# Patient Record
Sex: MALE | Employment: OTHER | URBAN - METROPOLITAN AREA
[De-identification: names, ages, dates, MRNs, and addresses within clinical notes are randomized per-mention and may not be internally consistent; named-entity substitution may affect disease eponyms.]

---

## 2022-04-01 ENCOUNTER — OUTSIDE SERVICES (OUTPATIENT)
Dept: PRIMARY CARE CLINIC | Age: 67
End: 2022-04-01

## 2022-04-01 DIAGNOSIS — S22.42XG CLOSED FRACTURE OF MULTIPLE RIBS OF LEFT SIDE WITH DELAYED HEALING, SUBSEQUENT ENCOUNTER: Primary | ICD-10-CM

## 2022-04-01 DIAGNOSIS — G20 PARKINSON DISEASE (HCC): ICD-10-CM

## 2022-04-01 DIAGNOSIS — J44.9 CHRONIC OBSTRUCTIVE PULMONARY DISEASE, UNSPECIFIED COPD TYPE (HCC): ICD-10-CM

## 2022-04-02 VITALS
RESPIRATION RATE: 18 BRPM | TEMPERATURE: 98.1 F | SYSTOLIC BLOOD PRESSURE: 121 MMHG | HEART RATE: 84 BPM | DIASTOLIC BLOOD PRESSURE: 79 MMHG

## 2022-04-02 ASSESSMENT — ENCOUNTER SYMPTOMS
BACK PAIN: 1
NAUSEA: 0
DIARRHEA: 0
SINUS PRESSURE: 0
COUGH: 0
SHORTNESS OF BREATH: 0
CONSTIPATION: 0
SINUS PAIN: 0

## 2022-04-02 NOTE — PROGRESS NOTES
Jessika Chaudhary is a 77 y.o. male being seen for his weekly follow up. Location of visit: [facility]    Visit Date:  04/01/2022    Reason for Visit:    Chief Complaint   Patient presents with    Other     rib fracture        HPI   Resident has had multiple falls, including one since arriving. He has fractured left ribs. He also has Parkinson's, Atrial fibrillation, COPD, neuropathy, anxiety and depression. Staff reports he is combative with changing and therapy reports he was not cooperative with therapy evaluation. Review of Systems   Constitutional: Positive for fatigue. Negative for chills and fever. HENT: Negative for congestion, sinus pressure and sinus pain. Respiratory: Negative for cough and shortness of breath. Cardiovascular: Positive for chest pain. Negative for palpitations and leg swelling. Gastrointestinal: Negative for constipation, diarrhea and nausea. Musculoskeletal: Positive for back pain and gait problem. Skin: Negative for rash and wound. Neurological: Positive for weakness. Psychiatric/Behavioral: Positive for agitation and behavioral problems. The patient is nervous/anxious. Physical Exam  Vitals and nursing note reviewed. Constitutional:       Appearance: Normal appearance. HENT:      Head: Normocephalic and atraumatic. Cardiovascular:      Rate and Rhythm: Normal rate and regular rhythm. Heart sounds: Normal heart sounds. Pulmonary:      Effort: Pulmonary effort is normal.      Breath sounds: Normal breath sounds. Abdominal:      General: Bowel sounds are normal.      Palpations: Abdomen is soft. Musculoskeletal:      Right lower leg: No edema. Left lower leg: No edema. Comments: Left rib pain   Skin:     General: Skin is warm. Neurological:      Mental Status: He is alert. He is disoriented. Motor: Weakness present. Psychiatric:         Mood and Affect: Mood is anxious. Behavior: Behavior is agitated. Cognition and Memory: Cognition is impaired. Memory is impaired. Not on File     No past medical history on file. ASSESSMENT:  /79   Pulse 84   Temp 98.1 °F (36.7 °C)   Resp 18       Diagnosis Orders   1. Closed fracture of multiple ribs of left side with delayed healing, subsequent encounter     2. Parkinson disease ([facility]     3. Chronic obstructive pulmonary disease, unspecified COPD type ([facility]          Plan:  Continue lidocaine patch to ribs. Approach slowly and explain what needs to be done before assisting. Therapy as tolerated.

## 2022-04-05 ENCOUNTER — OUTSIDE SERVICES (OUTPATIENT)
Dept: PRIMARY CARE CLINIC | Age: 67
End: 2022-04-05

## 2022-04-05 DIAGNOSIS — I10 PRIMARY HYPERTENSION: ICD-10-CM

## 2022-04-05 DIAGNOSIS — G20 PARKINSON DISEASE (HCC): ICD-10-CM

## 2022-04-05 PROBLEM — M54.9 CHRONIC BACK PAIN: Status: ACTIVE | Noted: 2019-07-30

## 2022-04-05 PROBLEM — G62.9 PERIPHERAL NERVE DISEASE: Status: ACTIVE | Noted: 2018-07-10

## 2022-04-05 PROBLEM — N52.9 ERECTILE DYSFUNCTION: Status: ACTIVE | Noted: 2018-02-12

## 2022-04-05 PROBLEM — R39.9 LOWER URINARY TRACT SYMPTOMS: Status: ACTIVE | Noted: 2018-02-12

## 2022-04-05 PROBLEM — G89.29 CHRONIC BACK PAIN: Status: ACTIVE | Noted: 2019-07-30

## 2022-04-05 ASSESSMENT — ENCOUNTER SYMPTOMS
SHORTNESS OF BREATH: 0
COUGH: 0
VOMITING: 0
NAUSEA: 0
DIARRHEA: 0

## 2022-04-05 NOTE — ASSESSMENT & PLAN NOTE
with fall at home- continue therapy. pt does not want to stay and wants to do OP PT. Will see how therapy goes. Encouraged pt to participate fully so that he can go home soon.

## 2022-04-05 NOTE — PROGRESS NOTES
Reena Hernandez is a 77 y.o. male being seen for his weekly follow up. Location of visit: [facility]    HPI:  New today:Pt wanting to go home. No new complaints- says pain is controlled with patches. Had a fall right after admit, ut that seems to be due to pain- he was trying to get up and get comfortable per nursing/ therapy        Review of Systems   Constitutional: Negative for activity change, appetite change, chills, diaphoresis and fever. HENT: Negative for congestion. Respiratory: Negative for cough and shortness of breath. Cardiovascular: Negative for chest pain and palpitations. Gastrointestinal: Negative for diarrhea, nausea and vomiting. Genitourinary: Negative for hematuria. Musculoskeletal: Negative for arthralgias and myalgias. Skin: Negative for rash and wound. Neurological: Negative for weakness and headaches. Psychiatric/Behavioral: Negative for agitation, dysphoric mood and sleep disturbance. The patient is not nervous/anxious. Physical Exam  Vitals reviewed. Constitutional:       General: He is not in acute distress. HENT:      Head: Normocephalic and atraumatic. Nose: No congestion or rhinorrhea. Eyes:      General:         Right eye: No discharge. Left eye: No discharge. Cardiovascular:      Rate and Rhythm: Normal rate and regular rhythm. Pulmonary:      Effort: Pulmonary effort is normal. No respiratory distress. Breath sounds: Normal breath sounds. Abdominal:      Palpations: Abdomen is soft. Tenderness: There is no abdominal tenderness. Musculoskeletal:      Right lower leg: No edema. Left lower leg: No edema. Skin:     General: Skin is warm and dry. Neurological:      Mental Status: He is alert. Mental status is at baseline. ASSESSMENT:   Diagnosis Orders   1. Parkinson disease ([address]     2. Primary hypertension         PLAN:    1.  Parkinson disease [facility])  Assessment & Plan:   with fall at home- continue therapy. pt does not want to stay and wants to do OP PT. Will see how therapy goes. Encouraged pt to participate fully so that he can go home soon.    2. Primary hypertension  Assessment & Plan:   BP stable

## 2022-04-12 ENCOUNTER — OUTSIDE SERVICES (OUTPATIENT)
Dept: PRIMARY CARE CLINIC | Age: 67
End: 2022-04-12

## 2022-04-12 DIAGNOSIS — I48.91 ATRIAL FIBRILLATION, UNSPECIFIED TYPE (HCC): ICD-10-CM

## 2022-04-12 DIAGNOSIS — G20 PARKINSON DISEASE (HCC): ICD-10-CM

## 2022-04-12 DIAGNOSIS — I10 PRIMARY HYPERTENSION: ICD-10-CM

## 2022-04-12 ASSESSMENT — ENCOUNTER SYMPTOMS
NAUSEA: 0
COUGH: 0
DIARRHEA: 0
SHORTNESS OF BREATH: 0
VOMITING: 0

## 2022-04-12 NOTE — ASSESSMENT & PLAN NOTE
still very impulsive per therapy but much improved and probably as good as they will get him here. Plan for discharge on Friday.

## 2022-04-12 NOTE — PROGRESS NOTES
improved and probably as good as they will get him here. Plan for discharge on Friday. 2. Atrial fibrillation, unspecified type [facility])  Assessment & Plan:   Asymptomatic, continue current medications  3.  Primary hypertension  Assessment & Plan:   Well-controlled, continue current medications

## 2022-12-15 ENCOUNTER — HOSPITAL ENCOUNTER (INPATIENT)
Age: 67
LOS: 15 days | Discharge: HOME | End: 2022-12-30
Attending: PHYSICAL MEDICINE & REHABILITATION | Admitting: PHYSICAL MEDICINE & REHABILITATION
Payer: MEDICARE

## 2022-12-15 DIAGNOSIS — G20 PARKINSON DISEASE (HCC): ICD-10-CM

## 2022-12-15 DIAGNOSIS — I48.91 ATRIAL FIBRILLATION, UNSPECIFIED TYPE (HCC): Primary | ICD-10-CM

## 2022-12-15 DIAGNOSIS — G95.20 CORD COMPRESSION (HCC): ICD-10-CM

## 2022-12-15 PROCEDURE — 1180000000 HC REHAB R&B

## 2022-12-15 PROCEDURE — 6370000000 HC RX 637 (ALT 250 FOR IP): Performed by: PHYSICAL MEDICINE & REHABILITATION

## 2022-12-15 RX ORDER — TIZANIDINE 2 MG/1
2 TABLET ORAL EVERY 8 HOURS PRN
Status: DISCONTINUED | OUTPATIENT
Start: 2022-12-15 | End: 2022-12-30 | Stop reason: HOSPADM

## 2022-12-15 RX ORDER — ESCITALOPRAM OXALATE 10 MG/1
20 TABLET ORAL DAILY
Status: DISCONTINUED | OUTPATIENT
Start: 2022-12-16 | End: 2022-12-30 | Stop reason: HOSPADM

## 2022-12-15 RX ORDER — ACETAMINOPHEN 325 MG/1
650 TABLET ORAL EVERY 4 HOURS PRN
Status: DISCONTINUED | OUTPATIENT
Start: 2022-12-15 | End: 2022-12-30 | Stop reason: HOSPADM

## 2022-12-15 RX ORDER — BISACODYL 10 MG
10 SUPPOSITORY, RECTAL RECTAL DAILY PRN
Status: DISCONTINUED | OUTPATIENT
Start: 2022-12-15 | End: 2022-12-30 | Stop reason: HOSPADM

## 2022-12-15 RX ORDER — SENNA PLUS 8.6 MG/1
2 TABLET ORAL DAILY PRN
Status: DISCONTINUED | OUTPATIENT
Start: 2022-12-15 | End: 2022-12-19

## 2022-12-15 RX ORDER — OXYCODONE HYDROCHLORIDE AND ACETAMINOPHEN 5; 325 MG/1; MG/1
2 TABLET ORAL EVERY 4 HOURS PRN
Status: DISCONTINUED | OUTPATIENT
Start: 2022-12-15 | End: 2022-12-18

## 2022-12-15 RX ORDER — RASAGILINE 1 MG/1
1 TABLET ORAL DAILY
Status: DISCONTINUED | OUTPATIENT
Start: 2022-12-16 | End: 2022-12-30 | Stop reason: HOSPADM

## 2022-12-15 RX ORDER — WARFARIN SODIUM 5 MG/1
5 TABLET ORAL DAILY
Status: DISCONTINUED | OUTPATIENT
Start: 2022-12-16 | End: 2022-12-15 | Stop reason: DRUGHIGH

## 2022-12-15 RX ORDER — POLYETHYLENE GLYCOL 3350 17 G/17G
17 POWDER, FOR SOLUTION ORAL DAILY
Status: DISCONTINUED | OUTPATIENT
Start: 2022-12-15 | End: 2022-12-19

## 2022-12-15 RX ADMIN — TIZANIDINE 2 MG: 2 TABLET ORAL at 20:53

## 2022-12-15 RX ADMIN — OXYCODONE HYDROCHLORIDE AND ACETAMINOPHEN 2 TABLET: 5; 325 TABLET ORAL at 20:53

## 2022-12-15 ASSESSMENT — LIFESTYLE VARIABLES
HOW OFTEN DO YOU HAVE A DRINK CONTAINING ALCOHOL: NEVER
HOW MANY STANDARD DRINKS CONTAINING ALCOHOL DO YOU HAVE ON A TYPICAL DAY: PATIENT DOES NOT DRINK

## 2022-12-15 ASSESSMENT — PAIN SCALES - GENERAL: PAINLEVEL_OUTOF10: 2

## 2022-12-15 NOTE — CARE COORDINATION
ACUTE INPATIENT REHABILITATION  Financial Disclosure Statement: Eligibility and Benefit Verification    Patient Name: Marvin Douglass MRN: <[id number]>     Disclosure Statement  [facility] Acute Inpatient Rehabilitation at [facility] provides 24 hour individualized service to patients with functional limitations due to, but not limited to stroke, brain injury, spinal cord injury, major multiple trauma, hip fracture, amputation, and neurological disorders. Acute Inpatient Rehabilitation provides rehabilitative nursing, physician coverage, as well as physical therapy, occupational therapy, speech therapy, recreational therapy and other services as deemed necessary by our Board Certified Physical Medicine and Via Tayo Brown, Dr. Elida Horn and Dr. Sharon Pathak and Dr. Mala Buckner. [facility] Acute Inpatient Rehabilitation at [facility] is fully accredited by the Commission on Accreditation of Rehabilitation Facilities (CARF) and The Joint Commission (TJC). At a minimum, you will receive 5 days of therapy services totaling at least 15 hours per week. Your treatment program will consist of physical therapy at least 7.5 hours per week; occupational therapy 7.5 hours per week; and speech therapy 1.5 hours per week, as applicable. Your estimated length of stay is currently:  7-10 days. Your insurance coverage has been verified as follows:   Primary Insurance: Medicare   Deductible: $ 1556                       Coverage:  Per Medicare Benefit Period  Days 1-60:  $0 Co-Insurance  Days 61-90:  $389/day Co-Insurance  Days 91 and beyond:  $778/day Co-Insurance      Secondary Insurance: [employer] often cover co-payments amounts. Please contact your insurance company to verify benefits. [facility]: [phone] for all billing questions.     Thank You for choosing [address] [place] [address] at [facility].                   Revised 10/04/2022

## 2022-12-15 NOTE — CARE COORDINATION
[address] [facility]  PRE-ADMISSION ASSESSMENT     Patient Name: Maria A Jefferson        MRN:   <[id number]>    : 1955  (78 y.o.)  Gender: male   Ethnicity: Not , /a or Ecuadorean Origin  Race: White     Admitted from:  Other Facility: [address]       Type of Admission:  New Admission      Date of Onset / Admission to the [address] [place] [address]:  22     Inpatient Rehabilitation Admitting Diagnosis:  Cord compression s/p surgical decompression/fusion L1-4      Did patient have surgery/procedures? Yes, As Listed Below   2022:   L1 through L4 level laminectomy and bilateral foraminotomy  L3-4 Angelo procedure   Non segmental fixation S8-0  Application of interbody fusion device L3-4   Removal of posterior instrumentation L4-5  Harvesting bone graft through same incision   Physicians:   Omar Apodaca MD  Physician   Neurosurgery      Risk for Clinical Complications:   Moderate      Co-morbidities:    Parkinsons   Atrial Fibrillation  OA  Financial Information  Primary insurance: Medicare      Secondary Insurance: Commercial Insurance:[employer]       Precautions:   [x]Cardiac Precautions: Stimulator with Battery, Left Chest  []Total hip precautions: No Total Hip Precautions  []Weight Bearing status: No Weight Bearing Restrictions  []Spinal precautions: Back brace when up or sitting   [x]Safety Precautions/Concerns:  Fall Risk, General Precautions, R GHADA drain  [x]Visually impaired: Yes: Glasses       []Hard of Hearing: Within Functional Limits        Communication Aids, Devices or Interpretation Services Required: None     Isolation Precautions:  None: Standard Precautions                  Physiatrist: Dr. Letha Ko       Patients Occupation: Retired     Reviewed Lab and Diagnostic reports from Current Admission: Yes     Patients Prior Functional  Level:   Patient independent with bed mobility, significant assist to rise from couch, modified independent ambulation with 4WW, independent for toileting, dressing and bathing except wife assists with drying, wife performs all household chores/shopping/driving     History of current illness, per PM&R Consult: Patient with spinal cord compression who had L1-4 laminectomy and fusion performed by Dr. Jeanie Rivera formarked L1-2 stenosis and degenerative spondylolisthesis at L3-4 on 12/7/22. Medical records indicate some post-op confusion. Prognosis: Fair     Current functional status for upper extremity ADLs:   Dependent                                  Current functional status for lower extremity ADLs:   Dependent      Current functional status for bed, chair, wheelchair transfers:   Rolling: Min Assist  Supine to Sit: Mod Assist  Scooting: Min Assist  Sit to Stand:  Mod Assist x2  Stand to Sit: Mod Assist x2   Bed to Chair: Max A x2     Current functional status for toilet transfers:   Dependent     Current functional status for locomotion:   Ambulation distance 2ft with rolling walker, Max assist x2      Current functional status for comprehension: Exceptions to [facility]     Current functional status for expression: Exceptions to [facility]     Current functional status for social interaction: Exceptions to [facility]     Current functional status for problem solving: Exceptions to [facility]     Current functional status for memory: Exceptions to [facility]     Current Deficits R/T Impairment: Impaired Functional Mobility and Decreased ADLs     Required Therapy Services:  Physical Therapy: Yes  Occupational Therapy: Yes  Speech Therapy: May benefit from speech therapy services, patient to be screened upon admission       Additional Services:    [x] /Case Management    [] Recreational Therapy, as appropriate    [x] Nutrition Consult, as appropriate  [x] Dietary Needs/Preferences: No Specialized Dietary Needs/Preferences Identified  [] Dialysis  [x] Cultural/Jewish Needs/Preferences: No Specialized Cultural/Jewish Needs/Preferences Identified  [] Special Equipment Needs  [] Special Medication Needs  [x] Other information relevant to patient's care needs: R GHADA Drain, Back Brace     Preferred Language: English     Does the patient need or want an  to communicate with a doctor or health care staff? No     Rehab Justification:  Needs 3 hrs therapy per day or 15 hours per week:  Yes  Identified Rehab Nursing needs: Yes  Intense Interdisciplinary need:  Yes  Need for 24 hr physician supervision:  Yes  Measurable improved quality of life:  Yes  Willingness to participate:  Yes  Medical Necessity:  Yes  Patient able to tolerate care proposed:  Yes     Expected Discharge Destination/Functional Level:  Home with assist  Expected length of time to achieve that level of improvement: 1-2 weeks  Expected Post Discharge Treatments: Home with possible Home Care     Acute Inpatient Rehabilitation Disclosure Statement will be provided to patient upon admission to ARU with patient's verbalization of understanding. I have reviewed and concur with the findings and results of the pre-admission screening assessment completed by the Inpatient Rehabilitation Admissions Coordinator.

## 2022-12-15 NOTE — PROGRESS NOTES
[address] [facility]  PRE-ADMISSION ASSESSMENT    Patient Name: Fabian Sharma        MRN: <[id number]>    : 1955  (78 y.o.)  Gender: male   Ethnicity: Not , /a or Liberian Origin  Race: White    Admitted from:  Other Facility: [address]      Type of Admission:  New Admission     Date of Onset / Admission to the [address] [place] [address]:  22    Inpatient Rehabilitation Admitting Diagnosis:  Cord compression s/p surgical decompression/fusion L1-4     Did patient have surgery/procedures? Yes, As Listed Below   2022:   L1 through L4 level laminectomy and bilateral foraminotomy  L3-4 Angelo procedure   Non segmental fixation N3-8  Application of interbody fusion device L3-4   Removal of posterior instrumentation L4-5  Harvesting bone graft through same incision   Physicians:   Som Schulte MD  Physician   Neurosurgery     Risk for Clinical Complications:   Moderate     Co-morbidities:    Parkinsons   Atrial Fibrillation  OA  Financial Information  Primary insurance: Medicare     Secondary Insurance: Commercial Insurance:A[employer]      Precautions:   [x]Cardiac Precautions: Stimulator with Battery, Left Chest  []Total hip precautions: No Total Hip Precautions  []Weight Bearing status: No Weight Bearing Restrictions  []Spinal precautions: Back brace when up or sitting   [x]Safety Precautions/Concerns:  Fall Risk, General Precautions, R GHADA drain  [x]Visually impaired: Yes: Glasses    []Hard of Hearing: Within Functional Limits       Communication Aids, Devices or Interpretation Services Required: None    Isolation Precautions:  None: Standard Precautions       Physiatrist: Dr. Gene Soler      Patients Occupation: Retired    Reviewed Lab and Diagnostic reports from Current Admission: Yes    Patients Prior Functional  Level:   Patient independent with bed mobility, significant assist to rise from couch, modified independent ambulation with 4WW, independent for toileting, dressing and bathing except wife assists with drying, wife performs all household chores/shopping/driving    History of current illness, per PM&R Consult: Patient with spinal cord compression who had L1-4 laminectomy and fusion performed by Dr. Bebo Jason formarked L1-2 stenosis and degenerative spondylolisthesis at L3-4 on 12/7/22. Medical records indicate some post-op confusion. Prognosis: Fair    Current functional status for upper extremity ADLs:   Dependent       Current functional status for lower extremity ADLs:   Dependent     Current functional status for bed, chair, wheelchair transfers:   Rolling: Min Assist  Supine to Sit: Mod Assist  Scooting: Min Assist  Sit to Stand:  Mod Assist x2  Stand to Sit: Mod Assist x2   Bed to Chair: Max A x2    Current functional status for toilet transfers:   Dependent    Current functional status for locomotion:   Ambulation distance 2ft with rolling walker, Max assist x2     Current functional status for comprehension: Exceptions to [facility]    Current functional status for expression: Exceptions to [facility]    Current functional status for social interaction: Exceptions to [facility]    Current functional status for problem solving: Exceptions to [facility]    Current functional status for memory: Exceptions to [facility]    Current Deficits R/T Impairment: Impaired Functional Mobility and Decreased ADLs    Required Therapy Services:  Physical Therapy: Yes  Occupational Therapy: Yes  Speech Therapy: May benefit from speech therapy services, patient to be screened upon admission      Additional Services:    [x] /Case Management    [] Recreational Therapy, as appropriate    [x] Nutrition Consult, as appropriate  [x] Dietary Needs/Preferences: No Specialized Dietary Needs/Preferences Identified  [] Dialysis  [x] Cultural/Orthodoxy Needs/Preferences: No Specialized Cultural/Orthodoxy Needs/Preferences Identified  [] Special Equipment Needs  [] Special Medication Needs  [x] Other information relevant to patient's care needs: R GHADA Drain, Back Brace    Preferred Language: English    Does the patient need or want an  to communicate with a doctor or health care staff? No    Rehab Justification:  Needs 3 hrs therapy per day or 15 hours per week:  Yes  Identified Rehab Nursing needs: Yes  Intense Interdisciplinary need:  Yes  Need for 24 hr physician supervision:  Yes  Measurable improved quality of life:  Yes  Willingness to participate:  Yes  Medical Necessity:  Yes  Patient able to tolerate care proposed:  Yes    Expected Discharge Destination/Functional Level:  Home with assist  Expected length of time to achieve that level of improvement: 1-2 weeks  Expected Post Discharge Treatments: Home with possible Home Care    Acute Inpatient Rehabilitation Disclosure Statement will be provided to patient upon admission to ARU with patient's verbalization of understanding. I have reviewed and concur with the findings and results of the pre-admission screening assessment completed by the Inpatient Rehabilitation Admissions Coordinator.

## 2022-12-15 NOTE — CARE COORDINATION
Pre-Admission Assessment completed and co-signed by PM&R physiatrist Dr. Cathryn Sher. Preadmission assessment valid for 48 hours after co-signature. Discharging provider responsible for Discharge/Readmit medication reconciliation action. Status: Discharge orders received. DVT Prophylaxis Plan: BLE Dopplers Required: Status: Negative / Only the preliminary report is needed for result interpretation/impression, do not need to wait for final report / If Negative: Patient may admit / If POSITIVE: Patient MUST be medically cleared and have treatment plan initiated prior to admission     Will need clarification from surgeon regarding re-start of Warfarin. Discharging nurse to call nursing report to Acute Inpatient Rehabilitation nursing team at extension [phone] before patient departure. Transportation Time: 4:00pm    Updated CM: Via Telephone at this time.

## 2022-12-16 PROBLEM — E44.0 MODERATE MALNUTRITION (HCC): Status: ACTIVE | Noted: 2022-12-16

## 2022-12-16 LAB
ABSOLUTE EOS #: 0.3 K/UL (ref 0–0.4)
ABSOLUTE LYMPH #: 2 K/UL (ref 1–4.8)
ABSOLUTE MONO #: 0.6 K/UL (ref 0.1–1.3)
ALBUMIN SERPL-MCNC: 4 G/DL (ref 3.5–5.2)
ALP BLD-CCNC: 96 U/L (ref 40–129)
ALT SERPL-CCNC: 44 U/L (ref 5–41)
ANION GAP SERPL CALCULATED.3IONS-SCNC: 12 MMOL/L (ref 9–17)
AST SERPL-CCNC: 35 U/L
BASOPHILS # BLD: 1 % (ref 0–2)
BASOPHILS ABSOLUTE: 0.1 K/UL (ref 0–0.2)
BILIRUB SERPL-MCNC: 0.7 MG/DL (ref 0.3–1.2)
BILIRUBIN DIRECT: 0.2 MG/DL
BILIRUBIN, INDIRECT: 0.5 MG/DL (ref 0–1)
BUN BLDV-MCNC: 23 MG/DL (ref 8–23)
CALCIUM SERPL-MCNC: 9.7 MG/DL (ref 8.6–10.4)
CHLORIDE BLD-SCNC: 103 MMOL/L (ref 98–107)
CO2: 25 MMOL/L (ref 20–31)
CREAT SERPL-MCNC: 0.8 MG/DL (ref 0.7–1.2)
EOSINOPHILS RELATIVE PERCENT: 4 % (ref 0–4)
GFR SERPL CREATININE-BSD FRML MDRD: >60 ML/MIN/1.73M2
GLUCOSE BLD-MCNC: 95 MG/DL (ref 70–99)
HCT VFR BLD CALC: 35.9 % (ref 41–53)
HEMOGLOBIN: 12.5 G/DL (ref 13.5–17.5)
INR BLD: 1.3
LYMPHOCYTES # BLD: 28 % (ref 24–44)
MCH RBC QN AUTO: 33.7 PG (ref 26–34)
MCHC RBC AUTO-ENTMCNC: 34.9 G/DL (ref 31–37)
MCV RBC AUTO: 96.7 FL (ref 80–100)
MONOCYTES # BLD: 8 % (ref 1–7)
PDW BLD-RTO: 13.8 % (ref 11.5–14.9)
PLATELET # BLD: 423 K/UL (ref 150–450)
PMV BLD AUTO: 7 FL (ref 6–12)
POTASSIUM SERPL-SCNC: 3.9 MMOL/L (ref 3.7–5.3)
PROTHROMBIN TIME: 15.7 SEC (ref 11.8–14.6)
RBC # BLD: 3.72 M/UL (ref 4.5–5.9)
SEG NEUTROPHILS: 59 % (ref 36–66)
SEGMENTED NEUTROPHILS ABSOLUTE COUNT: 4.1 K/UL (ref 1.3–9.1)
SODIUM BLD-SCNC: 140 MMOL/L (ref 135–144)
TOTAL PROTEIN: 7 G/DL (ref 6.4–8.3)
WBC # BLD: 7 K/UL (ref 3.5–11)

## 2022-12-16 PROCEDURE — 1180000000 HC REHAB R&B

## 2022-12-16 PROCEDURE — 97530 THERAPEUTIC ACTIVITIES: CPT

## 2022-12-16 PROCEDURE — 97116 GAIT TRAINING THERAPY: CPT

## 2022-12-16 PROCEDURE — 92523 SPEECH SOUND LANG COMPREHEN: CPT

## 2022-12-16 PROCEDURE — 97535 SELF CARE MNGMENT TRAINING: CPT

## 2022-12-16 PROCEDURE — 80076 HEPATIC FUNCTION PANEL: CPT

## 2022-12-16 PROCEDURE — 85610 PROTHROMBIN TIME: CPT

## 2022-12-16 PROCEDURE — 36415 COLL VENOUS BLD VENIPUNCTURE: CPT

## 2022-12-16 PROCEDURE — 99223 1ST HOSP IP/OBS HIGH 75: CPT | Performed by: PHYSICAL MEDICINE & REHABILITATION

## 2022-12-16 PROCEDURE — 6370000000 HC RX 637 (ALT 250 FOR IP): Performed by: STUDENT IN AN ORGANIZED HEALTH CARE EDUCATION/TRAINING PROGRAM

## 2022-12-16 PROCEDURE — 85025 COMPLETE CBC W/AUTO DIFF WBC: CPT

## 2022-12-16 PROCEDURE — 97162 PT EVAL MOD COMPLEX 30 MIN: CPT

## 2022-12-16 PROCEDURE — 97166 OT EVAL MOD COMPLEX 45 MIN: CPT

## 2022-12-16 PROCEDURE — 80048 BASIC METABOLIC PNL TOTAL CA: CPT

## 2022-12-16 PROCEDURE — 6370000000 HC RX 637 (ALT 250 FOR IP): Performed by: PHYSICAL MEDICINE & REHABILITATION

## 2022-12-16 PROCEDURE — 92610 EVALUATE SWALLOWING FUNCTION: CPT

## 2022-12-16 PROCEDURE — 99223 1ST HOSP IP/OBS HIGH 75: CPT | Performed by: INTERNAL MEDICINE

## 2022-12-16 RX ORDER — CARBIDOPA AND LEVODOPA 25; 100 MG/1; MG/1
1 TABLET, EXTENDED RELEASE ORAL 2 TIMES DAILY
Status: DISCONTINUED | OUTPATIENT
Start: 2022-12-16 | End: 2022-12-30 | Stop reason: HOSPADM

## 2022-12-16 RX ORDER — WARFARIN SODIUM 5 MG/1
5 TABLET ORAL
Status: COMPLETED | OUTPATIENT
Start: 2022-12-16 | End: 2022-12-16

## 2022-12-16 RX ADMIN — WARFARIN SODIUM 5 MG: 5 TABLET ORAL at 19:49

## 2022-12-16 RX ADMIN — CARBIDOPA AND LEVODOPA 1 TABLET: 25; 100 TABLET ORAL at 19:50

## 2022-12-16 RX ADMIN — RASAGILINE 1 MG: 1 TABLET ORAL at 10:20

## 2022-12-16 RX ADMIN — ESCITALOPRAM OXALATE 20 MG: 10 TABLET ORAL at 08:02

## 2022-12-16 RX ADMIN — POLYETHYLENE GLYCOL 3350 17 G: 17 POWDER, FOR SOLUTION ORAL at 08:02

## 2022-12-16 RX ADMIN — CARBIDOPA AND LEVODOPA 1 TABLET: 25; 100 TABLET ORAL at 10:15

## 2022-12-16 RX ADMIN — CARBIDOPA AND LEVODOPA 1 TABLET: 25; 100 TABLET, EXTENDED RELEASE ORAL at 19:50

## 2022-12-16 ASSESSMENT — ENCOUNTER SYMPTOMS
EYES NEGATIVE: 1
ALLERGIC/IMMUNOLOGIC NEGATIVE: 1
RESPIRATORY NEGATIVE: 1
GASTROINTESTINAL NEGATIVE: 1

## 2022-12-16 ASSESSMENT — 9 HOLE PEG TEST
TESTTIME_SECONDS: 156.95
TESTTIME_SECONDS: 99.9
TEST_RESULT: IMPAIRED
TEST_RESULT: IMPAIRED

## 2022-12-16 ASSESSMENT — PAIN SCALES - GENERAL: PAINLEVEL_OUTOF10: 2

## 2022-12-16 NOTE — CARE COORDINATION
Called and left a VM message on Dr. Sonia Rose Nurses line. This patient has an appointment on Monday 12/19 with the Urology office and I asked for a return call so we could reschedule this patients appointment.

## 2022-12-16 NOTE — PROGRESS NOTES
[address]    Acute Rehabilitation Occupational Therapy Evaluation     Date: 22  Patient Name: Stacy Baird       Room: 7106/0486-18  MRN: [id number]  Account: [de-identified]   : 1955  (78 y.o.) Gender: male       Referring Practitioner: Emiliano Hwang MD  Diagnosis: Cord compression s/p surgical decompression/fusion L1-4  Additional Pertinent Hx: Patient with spinal cord compression who had L1-4 laminectomy and fusion performed by Dr. Andrés Paris formarked L1-2 stenosis and degenerative spondylolisthesis at L3-4 on 22. Medical records indicate some post-op confusion. Pt admitted to ARU on 12/15/22. Treatment Diagnosis: Impaired self care status    Past Medical History:  has no past medical history on file. Past Surgical History:   has no past surgical history on file. Restrictions  Restrictions/Precautions: Surgical Protocols, Fall Risk, General Precautions, Up as Tolerated  Required Braces or Orthoses?: Yes  Implants present? : Metal implants, Deep brain stimulator (Spinal Fusion and B TKAs)  Required Braces or Orthoses  Spinal: Lumbar Corset      Position Activity Restriction  Spinal Precautions: Avoid Excessive Bending, Lifting, and Twisting of spine  Other position/activity restrictions: LSO to be donned when OOB; [id number] Deepti Garcia per Dr. Gita Wagner to shower POD #7 (Sx date: 22). PT/OT therapy staff may titrate oxygen down as tolerated in therapy, but may only titrate up to maximum 4 L NC as needed in therapy. Nursing should perform oxygen titration > 4 L. Subjective  Subjective: \"I would like to be able to get up and go so I can go outside and do some repairs and go downstairs and reload some guerrero. Do the things I normally do. \" Pt stated in regards to ARU goals    Comments: [id number] Deepti Garcia per WENDY Gallegos for OT eval         Pain: Pt reports 7-8/10 pain in back    Social/Functional History  Social/Functional History  Lives With: Spouse (Danay)  Type of Home: House  Home Layout: One level, Laundry in basement [employer] does laundry. Per Solis Davies he will access basement to use the computer)  Home Access: Stairs to enter without rails  Entrance Stairs - Number of Steps: 1 platform step + 2 Steps with no Railings to enter; To basement level has stairs with landing and Bilateral Railings (\"Steps are curved\" per spouse)  Bathroom Shower/Tub: Walk-in shower, Doors  Bathroom Toilet: Standard  Bathroom Equipment: Hand-held shower, Grab bars in shower, Built-in shower seat, Grab bars around toilet  Bathroom Accessibility:  (Not accessible by 0XA)  Home Equipment: Precilla Calloway, 4 wheeled, [address], [place], quad, [place], [place], KOGL bars, Walker, standard (brakes do not work on [place])  Has the patient had two or more falls in the past year or any fall with injury in the past year?: Yes (\"too many to guess\" pt reports most falls occurring at home while \"trying to maneuver around [employer] Help From: Family, Neighbor  ADL Assistance: Needs assistance  Homemaking Assistance: Needs assistance (wife as primary)  Homemaking Responsibilities: No  Ambulation Assistance: Needs assistance (3DT; per Wife pt often times abandons walker. 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 Pt appears to be somewhat of a poor historian this date. Solis Davies arrives to help verify information.     Objective  Vision  Vision: Impaired  Vision Exceptions: Wears glasses at all times       Hearing  Hearing: Within functional limits         Sensation  Overall Sensation Status: Impaired (pt denies initially however pt has impaired light touch sensation on plantar surfaces of toes including his great toes.)    Observation/Palpation  Observation: LSO donned (LSO tends to slide upward on patient with movement) delayed processing and cognition impaired requiring maximal cues for safety    Cognition  Overall Orientation Status: Within Functional Limits  Orientation Level: Oriented X4    Cognition  Overall Cognitive Status: Exceptions  Arousal/Alertness: Delayed responses to stimuli  Following Commands: Follows one step commands with increased time, Follows one step commands with repetition  Attention Span: Difficulty attending to directions (Initially perseverating on absence of his wife during beginning of session)  Memory: Decreased short term memory, Decreased recall of precautions, Decreased recall of recent events  Safety Judgement: Decreased awareness of need for assistance, Decreased awareness of need for safety (Max cues for hands placement during transfers.)  Problem Solving: Assistance required to identify errors made, Assistance required to correct errors made, Assistance required to implement solutions, Assistance required to generate solutions, Decreased awareness of errors  Insights: Decreased awareness of deficits  Initiation: Requires cues for some  Sequencing: Requires cues for some  Cognition Comment: Pt's Wife Daisy Ferreira states that pt's cognition and memory is near baseline;  Hx of parkinson's disease Dx'd 10-15 years ago    Activities of Daily Living       Feeding: Stand by assistance  Feeding Skilled Clinical Factors: per wife report    Grooming: Minimal assistance  Grooming Skilled Clinical Factors: Min A with LUE to hold toothbrush due to pt dropping into sink mulitple times    UE Bathing: Maximum assistance  UE Bathing Skilled Clinical Factors: OT provided max verbal cues on this date to complete upper body bathing task with pt only able to minimally participate perserverating on washing the sink. [place] for minimal participation in task. Therapist A to complete. LE Bathing: Maximum assistance  LE Bathing Skilled Clinical Factors: Pt able to wash bilateral thighs while sitting in wheelchair. Pt required A with bilateral lower legs . Mod A for standing balance with therapist A to complete bolivar/bottom hygiene    UE Dressing: Maximum assistance  UE Dressing Skilled Clinical Factors: A for orientation of shirt with increased time. Pt able to find one sleeve with increased time and verbal cues. Pt required therapist A with threading LUE, putting short over head, and pulling down over chest and back. A with doffing/donning lumbar corset. LE Dressing: Dependent/Total  LE Dressing Skilled Clinical Factors: Therapist complete threading BLE and pulling up over hips. Mod A while standing for balance. Toileting: Dependent/Total  Toileting Skilled Clinical Factors: 2 person A while standing with A with clothing management and hygiene    Additional Comments: OT facilitated pts engagement in self care tasks on this date. Pt required max verbal/tactile cues with fair carryover. Pt currenlty limited due to decreased strength, FMC, balance, activity tolerance, and cognitive barriers impacting safety and independence with self care tasks.     OT scores     Eating  Assistance Needed: Supervision or touching assistance  Comment: per wife  CARE Score: 4  Discharge Goal: Set-up or clean-up assistance  Oral Hygiene  Assistance Needed: Partial/moderate assistance  CARE Score: 3  Discharge Goal: Set-up or clean-up assistance  [address] needed: Dependent  CARE Score: 1  Discharge Goal: Supervision or touching assistance  Shower/Bathe Self  Assistance Needed: Substantial/maximal assistance  CARE Score: 2  Discharge Goal: Supervision or touching assistance  Upper Body Dressing  Assistance Needed: Substantial/maximal assistance  CARE Score: 2  Discharge Goal: Supervision or touching assistance  Lower Body Dressing  Assistance Needed: Dependent  CARE Score: 1  Discharge Goal: Supervision or touching assistance  Putting On/Taking Off Footwear  Assistance Needed: Substantial/maximal assistance  CARE Score: 2  Discharge Goal: Supervision or touching assistance  Toilet Transfer  Assistance needed: Dependent  CARE Score: 1  Discharge Goal: Supervision or touching assistance     UE Function     LUE AROM (degrees)  LUE AROM : WFL  LUE General AROM: ~100 degrees shoulder flexion; otherwise WFL        Tone RUE  RUE Tone: Normotonic    LUE Strength  Gross LUE Strength: WFL  L Hand General: 4-/5    Left Hand Strength -  (lbs)  Handle Setting 3: 63# (67, 60, 62) (Norm: #)            RUE AROM (degrees)  RUE AROM : WFL  RUE General AROM: ~100 degrees shoulder flexion; otherwise WFL     Right Hand AROM (degrees)  Right Hand AROM: WFL  Tone LUE  LUE Tone: Normotonic    RUE Strength  Gross RUE Strength: WFL  R Hand General: 4-/5    Right Hand Strength -  (lbs)  Handle Setting 3: 63.3# (71, 61, 58) (Norm: #)         Fine Motor Skills/Coordination  Hand Dominance  Hand Dominance: Right  Coordination  Movements Are Fluid And Coordinated: No  Coordination and Movement Description: Fine motor impairments, Gross motor impairments, Decreased speed, Decreased accuracy, Right UE, Left UE, Tremors  Fine Motor Skills  Left 9-Hole Peg Test: Impaired  Left 9 Hole Peg Test Time (secs): 156.95 (Norm: 21-29 seconds)  Right 9-Hole Peg Test: Impaired  Right 9 Hole Peg Test Time (secs): 99.9 (Norm: 21-29 seconds)       Mobility  Bed mobility  Supine to Sit: Moderate assistance (A with trunk)  Bed Mobility Comments: AM: OT arrived to room due to bed alarm going off with pt attempting to sit EOB requesting to use bathroom. Bed mobility completed with HOB elevated with A with trunk.  Pt required intermittent Mod A while sitting EOB due to posterior left lean. Impulsive       Balance  Balance  Sitting Balance: Moderate assistance (Pt sitting EOB with Mod A to sit upright due to posterior left side lean; pt demonstrated left side lean in wheelchair with pillow placed to assist with support)  Standing Balance: Dependent/Total (Mod A x 2 with posterior lean; Able to progress to Mod A x 1)       Transfers  Transfers  Sit to stand: 2 Person assistance, Moderate assistance (Initially Mod A x 2 with pt able to progress to mod A x 1)  Stand to sit: 2 Person assistance, Moderate assistance (Initially Mod A x 2 with pt able to progress to mod A x 1)  Transfer Comments: Back braced donning while EOB with Mod A for sitting balance prior to transfers. Verbal cues for hand placement and safety with Fair carryover. Mod A x 2  Toilet Transfers  Toilet - Technique: Ambulating, Stand step  Equipment Used: Standard toilet  Toilet Transfer: 2 Person assistance, Moderate assistance  Toilet Transfers Comments: Pt completed mobility into bathroom for toileting task with 2 person A (Mod A x 1 and Min A x 1) with posterior lean. Mod A x 2 to safety sit onto toilet. Mod A x 2 to stand with verbal cue for hand placement and safety with posterior lean. Mod A x 2 for stand step to wheelchair from toilet                     Functional mobility  Functional Mobility  Functional - Mobility Device: Rolling Walker  Activity: To/from bathroom  Assist Level: Moderate assistance (Mod A x 1 and Min A x 1 with posterior lean with increased time; Pt able to progress to Mod A x 1 with small mobility from w/c to recliner chair)  Functional Mobility Comments: Verbal cues for hand placement and safety with Fair carryover. Mod A x 1 and Min A x 1 for safety with posterior lean. Pt able to progress to Mod A x 1 with RW from w/c to bed with max verbal cues for safety. Pt continues to demonstrate Posterior lean.     Assessment  Activity Tolerance  Activity Tolerance: Patient limited by fatigue, Patient limited by pain, Treatment limited secondary to decreased cognition, Patient Tolerated treatment well  Assessment  Performance deficits / Impairments: Decreased ADL status, Decreased functional mobility , Decreased strength, Decreased safe awareness, Decreased cognition, Decreased endurance, Decreased balance, Decreased fine motor control, Decreased coordination, Decreased posture  Treatment Diagnosis: Impaired self care status  Prognosis: Fair  Decision Making: Medium Complexity  Discharge Recommendations: 24 hour supervision or assist, Home with Home health OT    Patient Education  Education  Education Given To: Patient, Family  Education Provided: Role of Therapy, Plan of Care, Safety, ADL Function, Precautions  Education Method: Verbal, Demonstration  Barriers to Learning: Cognition  Education Outcome: Continued education needed    OT Equipment Recommendations  Other: TBD    Safety Devices  Type of Devices: Call light within reach, Chair alarm in place, Gait belt, Patient at risk for falls, Heels elevated for pressure relief, All fall risk precautions in place, Left in chair, Nurse notified (Nurse Jesi Massey notified)       Goals  Patient Goals   Patient goals : \"I would like to be able to get up and go so I can go outside and do some repairs and go downstairs and reload some guerrero. Do the things I normally do. \"  Short Term Goals  Time Frame for Short Term Goals: By 1 week  Short Term Goal 1: Pt will complete upper body dressing/bathing with Min A and Good attention/safety while maintaining back precautions  Short Term Goal 2: Pt will complete lower body dressing/bathing with Mod A and Good safety with use of AE as needed while maintaining back precautions  Short Term Goal 3: Pt will complete funcitonal transfers/mobility during self care tasks with Min A and Good safety with use of least restrictive device  Short Term Goal 4: Pt will tolerate standing 4+ minutes during self care tasks with Min A and Good safety  Short Term Goal 5: Pt will verbalize/demonstrate understanding of back precautions during daily activities 80% accuracy  Short Term Goal 6: Pt will participate in 30+ minutes during therapeutic exercises/functional activities to increase safety and independence with self care and mobility    Long Term Goals  Time Frame for Long Term Goals : By discharge  Long Term Goal 1: Pt will complete BADLs with SBA and Good attention/safety to task while maintaining back precautions  Long Term Goal 2: Pt will complete functional transfers/mobility during self care taks with SBA and Good safety with use of least restrictive device  Long Term Goal 3: Pt will tolerate standing 8+ minutes during self care tasks with SBA and Good safety  Long Term Goal 4: Pt will demonstrate increased [facility] and strength during self care tasks as evident by 5# improvement in  strength and 15 second improvement on 9 hole peg test.  Long Term Goal 5: Pt will demonstrate sitting EOB 20+ minutes during self care tasks while maintaining appropriate midline with SBA  Long Term Goal 6: Pt/family will verbalize/demonstrate of home safety/fall prevention strategies to increase safety and independence with self care and mobility    Plan  Occupational Therapy Plan  Times Per Week: 5-7  Times Per Day: Twice a day  Current Treatment Recommendations: Self-Care / ADL, Strengthening, ROM, Balance training, Functional mobility training, Endurance training, Cognitive reorientation, Pain management, Safety education & training, Patient/Caregiver education & training, Equipment evaluation, education, & procurement, Home management training, Coordination training, Cognitive/Perceptual training          12/16/22 0752 12/16/22 1044 12/16/22 1330   OT Individual Minutes   Time In 4600 [id number]   [facility] [id number]   VUTEVYL [id number] [phone] [id number]   Time Code Minutes    Timed Code Treatment Minutes 21 Minutes 67 Minutes 16 Minutes       Electronically signed by Magdiel Barry OT on 12/16/22 at 3:58 PM EST

## 2022-12-16 NOTE — PROGRESS NOTES
Speech Language Pathology  Facility/Department: [address] [place] [address]   CLINICAL BEDSIDE SWALLOW EVALUATION    NAME: Corby Mao  : 1955  MRN: [id number]    ADMISSION DATE: 12/15/2022  ADMITTING DIAGNOSIS: has Atrial fibrillation ([address] Chronic back pain; Erectile dysfunction; Gait disorder; Hypertension; Lower urinary tract symptoms; Malignant neoplasm of testis ([address] Osteoarthrosis; Parkinson disease ([address] Peripheral nerve disease; Cord compression ([address] and Moderate malnutrition ([address] on their problem list.    Recent Chest Xray/CT of Chest:    Date of Eval: 2022  Evaluating Therapist: FOREST Rust    Current Diet level:  Current Diet : Regular      Primary Complaint  Patient Complaint: Pt's wife present for session. Wife reports notices occasional coughing with food/drink when changes made in medications and when Pt more lethargic. Pain:  Pain Assessment  Pain Assessment: 0-10  Pain Level: 2  Patient's Stated Pain Goal: 4    Reason for Referral  Corby Mao was referred for a bedside swallow evaluation to assess the efficiency of his swallow function, identify signs and symptoms of aspiration and make recommendations regarding safe dietary consistencies, effective compensatory strategies, and safe eating environment. Impression  Dysphagia Diagnosis: Suspected needs further assessment  Dysphagia Impression : Pt presents with probable safe swallow for Regular diet with thin liquids as was evidenced by no overt s/s of aspiration with consistencies tested. ST to continue to monitor diet tolerance closely as Pt performance likely to fluctuate given progressive nature of Parkinson's Disease and c/o occasional coughing with PO when changes in medications occur or when more lethargic. Recommend small sips and bites, only feed when alert and awake and upright at 90 degrees for all PO intake.   Recommend close monitoring for overt/clinical s/s of aspiration and D/C PO intake and complete Modified Barium Swallow Study should they occur. Results and recommendations reported to RN. Dysphagia Outcome Severity Scale: Level 6: Within functional limits/Modified independence     Treatment Plan  Requires SLP Intervention: Yes     D/C Recommendations: Ongoing speech therapy is recommended during this hospitalization       Recommended Diet and Intervention  Recommended Diet: Regular  Recommended Liquid: Thin   Recommended Form of Meds: PO     Therapeutic Interventions: Diet tolerance monitoring;Oral motor exercises; Patient/Family education    Compensatory Swallowing Strategies  Compensatory Swallowing Strategies : Eat/Feed slowly;Upright as possible for all oral intake;Small bites/sips    Treatment/Goals  Dysphagia Goals: The patient will tolerate recommended diet without observed clinical signs of aspiration; The patient/caregiver will demonstrate understanding of compensatory strategies for improved swallowing safety. General  Chart Reviewed: Yes  Comments: hx Parkinson's disease  Subjective  Subjective: Pt pleasant and cooperative. Behavior/Cognition: Alert; Cooperative;Pleasant mood  Respiratory Status: Room air  O2 Device: None (Room air)  Communication Observation: Dysarthria  Follows Directions: Complex  Dentition: Adequate  Patient Positioning: Upright in bed  Baseline Vocal Quality: Weak;Hoarse  Consistencies Administered: Regular;Pureed; Thin - straw; Thin - cup           Vision/Hearing  Vision  Vision Exceptions: Wears glasses at all times  Hearing  Hearing: Within functional limits    Oral Motor Deficits  Oral/Motor  Oral Hygiene: Moist;Clean    Oral Phase Dysfunction  Oral Phase  Oral Phase: WNL  Oral Phase  Oral Phase - Comment: Functional timely mastication and A-P transit with consistencies. Indicators of Pharyngeal Phase Dysfunction   Pharyngeal Phase   Pharyngeal Phase: No overt s/s aspiration observed, including with consecutive sips of thin liquid via cup.     Prognosis  Individuals consulted  Consulted and agree with results and recommendations: Patient; Family member  Family member consulted:  Wife    Education  Patient Education: Yes  Patient Education Response: Verbalizes understanding;Demonstrated understanding             Therapy Time  SLP Individual Minutes  Time In: 4471  Time Out: 6690  Minutes: [address] Jose Luis Keita M.A., [address] [place] [address]  12/16/2022 4:12 PM

## 2022-12-16 NOTE — DISCHARGE INSTR - COC
Continuity of Care Form    Patient Name: Willow Dasilva   :  1955  MRN:  [id number]    Admit date:  12/15/2022  Discharge date:      Code Status Order: Full Code   Advance Directives:     Admitting Physician:  Ruth Mora MD  PCP: No primary care provider on file. Discharging Nurse:  [address] Unit/Room#: 2113/4108-80  Discharging Unit Phone Number: [phone]    Emergency Contact:   Extended Emergency Contact Information  Primary Emergency Contact: [address], [address] Phone: [phone]  Relation: Spouse    Past Surgical History:  No past surgical history on file. Immunization History: There is no immunization history on file for this patient. Active Problems:  Patient Active Problem List   Diagnosis Code    Atrial fibrillation (HCC) I48.91    Chronic back pain M54.9, G89.29    Erectile dysfunction N52.9    Gait disorder R26.9    Hypertension I10    Lower urinary tract symptoms R39.9    Malignant neoplasm of testis (HCC) C62.90    Osteoarthrosis M19.90    Parkinson disease ([facility] [address] G20    Peripheral nerve disease G62.9    Cord compression ([facility]) G95.20       Isolation/Infection:   Isolation            No Isolation          Patient Infection Status       None to display            Nurse Assessment:  Last Vital Signs: BP (!) 159/87   Pulse (!) 122   Temp 97.7 °F (36.5 °C)   Resp 16   Ht 6' (1.829 m)   Wt 231 lb 4.8 oz (104.9 kg)   SpO2 96%   BMI 31.37 kg/m²     Last documented pain score (0-10 scale): Pain Level: 2  Last Weight:   Wt Readings from Last 1 Encounters:   12/15/22 231 lb 4.8 oz (104.9 kg)     Mental Status:  oriented    IV Access:  - None    Nursing Mobility/ADLs:  Walking   Assisted  Transfer  Assisted  Bathing  Assisted  Dressing  Assisted  Toileting  Assisted  Feeding  Independent  Med Admin  Assisted  Med Delivery   whole    Wound Care Documentation and Therapy:  Incision 12/15/22 Back Lower;Medial (Active)   Dressing Status Clean;Dry; Intact 12/15/22 2115 Quality 431: Preventive Care And Screening: Unhealthy Alcohol Use - Screening: Patient screened for unhealthy alcohol use using a single question and scores less than 2 times per year Dressing Change Due 12/16/22 12/15/22 1957   Dressing/Treatment Dry dressing 12/15/22 2115   Closure [place] 12/15/22 1957   Incision Assessment Dry;Erythema 12/15/22 1957   Drainage Amount None 12/15/22 1957   Odor None 12/15/22 1957   Sri-incision Assessment Intact;Fragile 12/15/22 1957   Number of days: 0        Elimination:  Continence: Bowel: {YES / LB:66578}  Bladder: {YES / TW:12361}  Urinary Catheter: {Urinary Catheter:459251539}   Colostomy/Ileostomy/Ileal Conduit: {YES / HN:36188}       Date of Last BM: ***  No intake or output data in the 24 hours ending 22 0831  No intake/output data recorded.     Safety Concerns:     [address] Safety Concerns:642781753}    Impairments/Disabilities:      [address] Impairments/Disabilities:937188164}    Nutrition Therapy:  Current Nutrition Therapy:   [address] Diet List:472332227}    Routes of Feeding: {CHP DME Other Feedings:187177967}  Liquids: {Slp liquid thickness:58519}  Daily Fluid Restriction: {CHP DME Yes amt example:457497511}  Last Modified Barium Swallow with Video (Video Swallowing Test): {Done Not Done URKK:332722793}    Treatments at the Time of Hospital Discharge:   Respiratory Treatments: ***  Oxygen Therapy:  {Therapy; copd oxygen:68434}  Ventilator:    { CC Vent ACEE:370522768}    Rehab Therapies: {THERAPEUTIC INTERVENTION:6364954437}  Weight Bearing Status/Restrictions: [address] [employer]  Weight Bearin}  Other Medical Equipment (for information only, NOT a DME order):  {EQUIPMENT:671810941}  Other Treatments: ***    Patient's personal belongings (please select all that are sent with patient):  {P DME Belongings:166581216}    RN SIGNATURE:  {Esignature:435735935}    CASE MANAGEMENT/SOCIAL WORK SECTION    Inpatient Status Date: ***    Readmission Risk Assessment Score:  Readmission Risk              Risk of Unplanned Readmission:  7           Discharging to Facility/ Agency   Name:   Address:  Phone:  Fax:    Dialysis Facility (if applicable) Quality 226: Preventive Care And Screening: Tobacco Use: Screening And Cessation Intervention: Patient screened for tobacco and never smoked Name:  Address:  Dialysis Schedule:  Phone:  Fax:    / signature: {Esignature:689422894}    PHYSICIAN SECTION    Prognosis: {Prognosis:4129628521}    Condition at Discharge: Gali Barros Patient Condition:387932502}    Rehab Potential (if transferring to Rehab): {Prognosis:2212627151}    Recommended Labs or Other Treatments After Discharge: ***    Physician Certification: I certify the above information and transfer of [place]  is necessary for the continuing treatment of the diagnosis listed and that he requires {Admit to Appropriate Level of Care:61337} for {GREATER/LESS:092071496} 30 days.      Update Admission H&P: {CHP DME Changes in YIZ:296703290}    PHYSICIAN SIGNATURE:  {Esignature:578826941} Quality 110: Preventive Care And Screening: Influenza Immunization: Influenza Immunization previously received during influenza season Detail Level: Detailed Quality 111:Pneumonia Vaccination Status For Older Adults: Pneumococcal Vaccination Previously Received

## 2022-12-16 NOTE — PLAN OF CARE
Problem: Discharge Planning  Goal: Discharge to home or other facility with appropriate resources  12/16/2022 1530 by Vandana Monge RN  Outcome: Progressing  12/16/2022 1133 by Vandana Monge RN  Outcome: Progressing     Problem: Skin/Tissue Integrity  Goal: Absence of new skin breakdown  Description: 1. Monitor for areas of redness and/or skin breakdown  2. Assess vascular access sites hourly  3. Every 4-6 hours minimum:  Change oxygen saturation probe site  4. Every 4-6 hours:  If on nasal continuous positive airway pressure, respiratory therapy assess nares and determine need for appliance change or resting period. 12/16/2022 1530 by Vandana Mnoge RN  Outcome: Progressing  12/16/2022 1133 by Vandana Monge RN  Outcome: Progressing     Problem: Safety - Adult  Goal: Free from fall injury  12/16/2022 1530 by Vandana Monge RN  Outcome: Progressing  12/16/2022 1133 by Vandana Monge RN  Outcome: Progressing     Problem: Pain  Goal: Verbalizes/displays adequate comfort level or baseline comfort level  12/16/2022 1530 by Vandana Monge RN  Outcome: Progressing  12/16/2022 1133 by Vandana Monge RN  Outcome: Progressing     Problem: Confusion  Goal: Confusion, delirium, dementia, or psychosis is improved or at baseline  Description: INTERVENTIONS:  1. Assess for possible contributors to thought disturbance, including medications, impaired vision or hearing, underlying metabolic abnormalities, dehydration, psychiatric diagnoses, and notify attending LIP  2. [place] high risk fall precautions, as indicated  3. Provide frequent short contacts to provide reality reorientation, refocusing and direction  4. Decrease environmental stimuli, including noise as appropriate  5. Monitor and intervene to maintain adequate nutrition, hydration, elimination, sleep and activity  6. If unable to ensure safety without constant attention obtain sitter and review sitter guidelines with assigned personnel  7.  Initiate Psychosocial CNS and Spiritual Care consult, as indicated  12/16/2022 1530 by Cierra Zapien RN  Outcome: Progressing  12/16/2022 1133 by Cierra Zapien RN  Outcome: Progressing     Problem: ABCDS Injury Assessment  Goal: Absence of physical injury  12/16/2022 1530 by Cierra Zapien RN  Outcome: Progressing  12/16/2022 1133 by Cierra Zapien RN  Outcome: Progressing

## 2022-12-16 NOTE — CARE COORDINATION
ONGOING ARU DISCHARGE PLAN:    Patient is alert and oriented x4. Spoke with patient and his wife who is visiting at the beside regarding discharge plan. Both patient and wife confirm they would like patient to go to out patient therapy. If they feel they need HHC , they will reach out and let me know    Called and left a VM message on Dr. Lisa Avalos Nurses line. This patient has an appointment on Monday 12/19 with the Urology office and I asked for a return call so we could reschedule this patients appointment. No return call received. DME: none ordered today    Will continue to follow for additional discharge needs.     Electronically signed by Avery Romero RN on 12/16/2022 at 3:35 PM

## 2022-12-16 NOTE — PROGRESS NOTES
Physical Therapy  Facility/Department: [facility] ACUTE REHAB  Rehabilitation Physical Therapy Initial Assessment    NAME: Terry Mclain  : 1955 (53 y.o.)  MRN: [id number]  CODE STATUS: Full Code    Date of Service: 22      History reviewed. No pertinent past medical history. No past surgical history on file. Chart Reviewed: Yes  Patient assessed for rehabilitation services?: Yes  Additional Pertinent Hx: Rekha Red is a 79 y.o. male With medical history of Atrial Fib and parkinsons disease who presented to undergo spinal surgery to remediate L1-2 stenosis and degenerative spondylolisthesis at L3-4 and presents with post-op back pain from spinal cord compression s/p laminectomy of L1-4 and PLIF L3-4 performed by Dr. Niru Berrios at [facility] on 22 . The patient admits to  [facility] / Caregiver Present: No  Referring Practitioner: Dr. Zoey Jones  Referral Date : 12/15/22  Diagnosis: Spinal Cord Compression  General Comment  Comments: Tete Mcfarland per Nurse Vasyl Mead to proceed with therapy assessments    Restrictions:  Restrictions/Precautions: Surgical Protocols; Fall Risk;General Precautions; Up as Tolerated  Required Braces or Orthoses  Spinal: Lumbar Corset  Position Activity Restriction  Spinal Precautions: Avoid Excessive Bending, Lifting, and Twisting of spine  Other position/activity restrictions: LSO to be donned when OOB; PT/OT therapy staff may titrate oxygen down as tolerated in therapy, but may only titrate up to maximum 4 L NC as needed in therapy. Nursing should perform oxygen titration > 4 L. SUBJECTIVE  Subjective: pt is agreeable to therapy assessments;  Pain: pt reports no pain while resting in chair. Pt asked to recall if pain levels increased during mobility and he only reports feeling \"Leg stiffness\" when mobilizing      Prior Level of Function:  Social/Functional History  Lives With: Spouse (Danay)  Type of Home: House  Home Layout: One level, Laundry in basement [employer] does laundry.  Per Tee Jay he will access basement to use the computer)  Home Access: Stairs to enter without rails  Entrance Stairs - Number of Steps: 1 platform step + 2 Steps with no Railings to enter; To basement level has stairs with landing and Bilateral Railings (\"Steps are curved\" per spouse)  Bathroom Shower/Tub: Walk-in shower, Doors  Bathroom Toilet: Standard  Bathroom Equipment: Hand-held shower, Grab bars in shower, Built-in shower seat, Grab bars around toilet  Bathroom Accessibility:  (Not accessible by 1PH)  Home Equipment: [employer]ino Hawks, 4 wheeled, [employer], quad, [address], KOGL bars, Walker, standard (brakes do not work on [place])  Has the patient had two or more falls in the past year or any fall with injury in the past year?: Yes (\"too many to guess\" pt reports most falls occurring at home while \"trying to maneuver around [employer] Help From: Family, Neighbor  ADL Assistance: Needs assistance  Homemaking Assistance: Needs assistance (wife as primary)  Homemaking Responsibilities: No  Ambulation Assistance: Needs assistance (4ZA; per Wife pt often times abandons walker. 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 Pt appears to be somewhat of a poor historian this date. Tee Jay arrives to help verify information.       OBJECTIVE  Vision  Vision: Impaired  Vision Exceptions: Wears glasses at all times    Hearing  Hearing: Within functional limits    Cognition  Overall Cognitive Status: Exceptions  Arousal/Alertness: Delayed responses to stimuli  Following Commands: Follows one step commands with increased time; Follows one step commands with repetition  Attention Span: Difficulty attending to directions (Initially perseverating on absence of his wife during beginning of session)  Memory: Decreased short term memory;Decreased recall of precautions;Decreased recall of recent events  Safety Judgement: Decreased awareness of need for assistance;Decreased awareness of need for safety (Max cues for hands placement during transfers.)  Problem Solving: Assistance required to identify errors made;Assistance required to correct errors made;Assistance required to implement solutions;Assistance required to generate solutions;Decreased awareness of errors  Insights: Decreased awareness of deficits  Initiation: Requires cues for some  Sequencing: Requires cues for some  Cognition Comment: Pt's Wife Oscar Salguero states that pt's cognition and memory is near baseline; Hx of parkinson's disease Dx'd 10-15 years ago    ROM  AROM RLE (degrees)  RLE AROM: Exceptions  RLE General AROM: Grossly WFL except Knee lacking ~5-10 degrees from 0 knee extension. PROM LLE (degrees)  LLE PROM: Exceptions  LLE General PROM: Grossly WFL except Knee lacking ~5-10  degrees from 0 knee extension and Ankle DF to neutral only.   AROM LLE (degrees)  LLE General AROM: See OT  AROM RUE (degrees)  RUE General AROM: See OT    Strength  Strength RLE  Strength RLE: WFL  Comment: Grossly 4/5  Strength LLE  Strength LLE: WFL  Comment: Grossly 4- to 4/5  Strength RUE  Comment: See OT  Strength LUE  Comment: See OT      Sensation  Overall Sensation Status: Impaired (pt denies initially however pt has impaired light touch sensation on plantar surfaces of toes including his great toes.)    Functional Mobility  Bed mobility  Bed Mobility Comments: Bed mobility is not assessed this AM as pt is already up seated in wheelchair and retires to [place] at end of session  Transfers  Sit to Stand: Moderate Assistance  Stand to Sit: Moderate Assistance  Stand Pivot Transfers: Minimal Assistance  Comment: RW for multiple transfers (including toilet transfer); pt demos excessive posterior leaning upon rising and returning to sit. maximal VVT cues for safety, hands placement, and anterior weightshifting with limited carry over. Balance  Posture: Fair  Sitting - Static: Fair (SBA)  Sitting - Dynamic: Fair;- (Min-SBA)  Standing - Static: Fair;- (RW - Rajni)  Standing - Dynamic: Fair;- (RW - Rajni)  Comments: moderate to heavy posterior leaning when standing statically/dynamically    Environmental Mobility  Ambulation  Surface: Level tile  Device: Rolling Walker  Assistance: Moderate assistance (Mod A mainly for backward stepping)  Quality of Gait: Fwd amb: forward trunk, NBOS, mildly unsteady; Upon attempting to step backwards pt impulsivley and quickly starts back-stepping with heavy posterior lean and backs into wheelchair. Gait Deviations: Decreased step height;Decreased step length;Decreased head and trunk rotation; Slow Katey; Shuffles  Distance: 4'x2 (4\" forward and 4\" backward)  More Ambulation?: Yes  Ambulation 2  Surface - 2: level tile  Device 2: Parallel Bars  Assistance 2: 2 Person assistance;Minimal assistance;Stand by assistance (Rajni for RW management and steadying support; +SBA for wheelchair follow)  Quality of Gait 2: forward trunk, very NBOS, unsteady, intermittent scissor gait (especially during turning), Flatfoot initial contact bilaterally, and difficulty steering RW straight and with turns  Gait Deviations: Decreased step length;Decreased step height;Decreased head and trunk rotation; Shuffles; Slow Katey  Distance: 104'  Comments: Maximal VVT cues for posture, step width, straightening knees, and maintaining forward gaze. Agustin Power all of which the patient has difficulty maintaining for > 5 seconds when focusing to correct deviations.   Stairs/Curb  Stairs?: No    PT Exercises  Exercise Treatment: Pt performs multiple STS and stand pivot transfers, ambulation training, dynamic functional toileting and hygiene tasks, and retires to recliner at end of session  A/AROM Exercises: Seated Bilateral Quad set x10 R/L (towel rolls under ankles)  Static Sitting Balance Exercises: Static Sitting on Toilet without support x5 minutes (SBA-Supervision)  Dynamic Sitting Balance Exercises: Dynamic sitting with appropriate weightshifting and leaning for bolivar-hygiene; SBA for safety. Dynamic Standing Balance Exercises: Pt participation in dynamic functional task of standing for bolivar hygeine and management of undergarments and pants as well as to wash hands at sink. Min-ModA for standing balance x5-6 minutes. Pt requiring Maximal cues for keeping knees/hips straight as pt demo's tendency to sink into flexed posturing while directing focus to tasks in standing. ASSESSMENT     Activity Tolerance  Activity Tolerance: Treatment limited secondary to decreased cognition;Patient tolerated evaluation without incident  Activity Tolerance Comments: Pt requires Inc time to complete tasks and inc time for processing of instruction    Assessment  Assessment: Pt presents with impaired balance, strength, body mechanics, and safety awareness s/p Lumbar PLIF and laminectomy. The patient requires ModAx1 for transfers with RW, and MinAx1 + SBA (w/c follow) for ambulation x104' with RW. The patient will benefit from continued PT and family training to address deficits and maximize safety and independence prior to discharge. Performance Deficits/Impairments: Decreased functional mobility ; Decreased ROM; Decreased body mechanics; Decreased tolerance to work activity; Decreased strength;Decreased safe awareness;Decreased cognition;Decreased balance; Increased pain;Decreased posture  Treatment Diagnosis: impaired functional mobility 2* weakness and balance disturbances s/p Lumbar PLIF and Laminectomy  Therapy Prognosis: Fair;Guarded  Decision Making: Medium Complexity  Exam: ROM, MMT, Balance, and functional mobility assessments  Barriers to Learning: Cognition, vision  Discharge Recommendations: Patient would benefit from continued therapy after discharge  PT D/C Equipment  Equipment Needed: Yes  Other: RW  PT Equipment Recommendations  Equipment Needed: Yes  Mobility Devices: Walker  Other: RW    CLINICAL IMPRESSION   Pt presents with impaired balance, strength, body mechanics, and safety awareness s/p Lumbar PLIF and laminectomy. The patient requires ModAx1 for transfers with RW, and MinAx1 + SBA (w/c follow) for ambulation x104' with RW. The patient will benefit from continued PT and family training to address deficits and maximize safety and independence prior to discharge. GOALS  Patient Goals   Patient Goals : patient \"I havn't really thought that far\". Spouse \"Strengthen Legs and improve safety\"  Short Term Goals  Time Frame for Short Term Goals: 7 days  Short Term Goal 1: pt to demo all Bed mobility with Rajni on flattened bed  Short Term Goal 2: pt to perform transfers with RW and Rajni  Short Term Goal 3: pt to ambulate 150' with RW and CGAx1  Short Term Goal 4: pt to negotiate single platform step + 2 successive steps (No rails) with Rajni x1 to allow for safe home access  Short Term Goal 5: pt to verbally identify 3/3 spinal precautions to maximize safety and functional outcomes.   Long Term Goals  Time Frame for Long Term Goals : Until D/C  Long Term Goal 1: pt to demo all Bed mobility on flattened bed with supervision  Long Term Goal 2: pt to perform transfers with RW and supervision  Long Term Goal 3: pt to ambulate 150' with RW and SBAx1  Long Term Goal 4: pt to negotiate single platform step + 2 successive steps (No rails) with SBA to allow for safe home access  Long Term Goal 5: pt to improve BLE strength by 1/2 MMG to improve safety with mobility  Additional Goals?: Yes  Long term goal 6: pt to improve Sitting and standing balance to FAIR or better to decrease risk for falls during mobility  Long term goal 7: pt to demo simulated or actual car transfer with SBA  Long term goal 8: pt to negotiate FF of steps with Bilateral Rail and SBA to allow for safe access to basement level of home. PLAN OF CARE  Frequency: 1-2 treatment sessions per day, 5-7 days per week  Physcial Therapy Plan  General Plan:  minutes of therapy at least 5 out of 7 days a week  Specific Instructions for Next Treatment: Progress ambulation  Current Treatment Recommendations: Strengthening;ROM;Balance training;Functional mobility training;Transfer training;Gait training; Wheelchair mobility training;Stair training;Neuromuscular re-education;Pain management;Home exercise program;Safety education & training;Patient/Caregiver education & training;Equipment evaluation, education, & procurement;Positioning; Therapeutic activities  Safety Devices  Type of Devices: Call light within reach; Chair alarm in place;Gait belt;Patient at risk for falls; Heels elevated for pressure relief; All fall risk precautions in place; Left in chair;Nurse notified (Nurse Vasyl Mead notified)    EDUCATION      12/16/22 1073   Patient Education   Education Given To Patient; Family   Education Provided Role of Therapy;Plan of Care;Home Exercise Program;Precautions;Transfer Training;Equipment; Fall Prevention Strategies   Education Method Demonstration;Verbal   Barriers to Learning Cognition;Vision   Education Outcome Continued education needed       ELOS:    2 weeks      Therapy Time   Individual Concurrent Group Co-treatment   Time In 5561         Time Out 0950         Minutes 96           Timed Code Treatment Minutes: 55 Minutes       Linda Hobbs PT, 12/16/22 at 2:40 PM

## 2022-12-16 NOTE — PROGRESS NOTES
Physical Therapy  Facility/Department: [facility] ACUTE REHAB  Rehabilitation Physical Therapy     NAME: Theron Colorado  : 1955 (79 y.o.)  MRN: [id number]  CODE STATUS: Full Code    Date of Service: 22      Past Medical History:   Diagnosis Date    Anxiety     Asthma     Atrial fibrillation (HCC)     Back pain     BPH (benign prostatic hyperplasia)     Dental disease     Depression     Diarrhea     MALONE (dyspnea on exertion)     Frequent falls     GERD (gastroesophageal reflux disease)     Memory loss     Obesity     Panic disorder     Parkinson's disease ([address]     Testicular cancer ([facility] [address]     Visual impairment      Past Surgical History:   Procedure Laterality Date    BRAIN SURGERY      Neurostimulator implant for Parkinson's    BROW LIFT      CARPAL TUNNEL RELEASE Right     CHOLECYSTECTOMY      COLONOSCOPY      CYSTOSCOPY      HERNIA REPAIR      LUMBAR LAMINECTOMY      LYMPH NODE DISSECTION      ORCHIECTOMY      SHOULDER ARTHROSCOPY Right     TOTAL KNEE ARTHROPLASTY Bilateral     TURP      WRIST SURGERY Left     cyst removal       Chart Reviewed: Yes  Patient assessed for rehabilitation services?: Yes  Additional Pertinent Hx: Douglas Mckeon is a 79 y.o. male With medical history of Atrial Fib and parkinsons disease who presented to undergo spinal surgery to remediate L1-2 stenosis and degenerative spondylolisthesis at L3-4 and presents with post-op back pain from spinal cord compression s/p laminectomy of L1-4 and PLIF L3-4 performed by Dr. Melly Jean at [facility] on 22 . The patient admits to  [facility] / Caregiver Present: No  Referring Practitioner: Dr. Sandip Pelayo  Referral Date : 12/15/22  Diagnosis: Spinal Cord Compression  General Comment  Comments: Jyoti Ferreira per Nurse Diana Arroyo to proceed with therapy assessments    Restrictions:  Restrictions/Precautions: Surgical Protocols; Fall Risk;General Precautions; Up as Tolerated  Required Braces or Orthoses  Spinal: Lumbar Corset  Position Activity Restriction  Spinal Precautions: Avoid Excessive Bending, Lifting, and Twisting of spine  Other position/activity restrictions: LSO to be donned when OOB; PT/OT therapy staff may titrate oxygen down as tolerated in therapy, but may only titrate up to maximum 4 L NC as needed in therapy. Nursing should perform oxygen titration > 4 L. SUBJECTIVE  Subjective: pt is agreeable to therapy assessments;         OBJECTIVE  Vision  Vision: Impaired  Vision Exceptions: Wears glasses at all times    Hearing  Hearing: Within functional limits                 Functional Mobility  Transfers  Sit to Stand: Moderate Assistance  Stand to Sit: Moderate Assistance  Stand Pivot Transfers: Minimal Assistance  Comment: RW for multiple transfers (including toilet transfer); pt demos excessive posterior leaning upon rising and returning to sit. maximal VVT cues for safety, hands placement, and anterior weightshifting with limited carry over. Environmental Mobility  Ambulation  Surface: Level tile  Device: Rolling Walker  Assistance: Moderate assistance (Mod A mainly for backward stepping)  Quality of Gait: Fwd amb: forward trunk, NBOS, mildly unsteady; Upon attempting to step backwards pt impulsivley and quickly starts back-stepping with heavy posterior lean and backs into wheelchair. Gait Deviations: Decreased step height;Decreased step length;Decreased head and trunk rotation; Slow Katey; Shuffles  Distance: 3 feet x 3  Comments: worked on transfer and ambulation at bedside for technique safety from varied surfaces and surfaces height. spouse present and discussed mobility for couch height.   More Ambulation?: Yes  Ambulation 2  Surface - 2: level tile  Device 2: Parallel Bars  Assistance 2: 2 Person assistance;Minimal assistance;Stand by assistance (Rajni for RW management and steadying support; +SBA for wheelchair follow)  Quality of Gait 2: forward trunk, very NBOS, unsteady, intermittent scissor gait (especially during turning), Flatfoot initial contact bilaterally, and difficulty steering RW straight and with turns  Gait Deviations: Decreased step length;Decreased step height;Decreased head and trunk rotation; Shuffles; Slow Katey  Distance: 104'  Comments: Maximal VVT cues for posture, step width, straightening knees, and maintaining forward gaze. Paullette Rakes all of which the patient has difficulty maintaining for > 5 seconds when focusing to correct deviations. Stairs/Curb  Stairs?: No    PT Exercises  Functional Mobility Circuit Training: sit to stands from varied surface heights, bed low, recliner chair high, wc slightly higher than standard. patient technique modified to preform nose over toes technique and rocking forward with hand placement one hand forward and one back. patient preform with quick rocking repeat 3-5 reps each. followed simple verval cues with reinforcement with tactile cueing on hand placement. ASSESSMENT               GOALS  Patient Goals   Patient Goals : patient \"I havn't really thought that far\". Spouse \"Strengthen Legs and improve safety\"  Short Term Goals  Time Frame for Short Term Goals: 7 days  Short Term Goal 1: pt to demo all Bed mobility with Rajni on flattened bed  Short Term Goal 2: pt to perform transfers with RW and Rajni  Short Term Goal 3: pt to ambulate 150' with RW and CGAx1  Short Term Goal 4: pt to negotiate single platform step + 2 successive steps (No rails) with Rajni x1 to allow for safe home access  Short Term Goal 5: pt to verbally identify 3/3 spinal precautions to maximize safety and functional outcomes.   Long Term Goals  Time Frame for Long Term Goals : Until D/C  Long Term Goal 1: pt to demo all Bed mobility on flattened bed with supervision  Long Term Goal 2: pt to perform transfers with RW and supervision  Long Term Goal 3: pt to ambulate 150' with RW and SBAx1  Long Term Goal 4: pt to negotiate single platform step + 2 successive steps (No rails) with SBA to allow for safe home access  Long Term Goal 5: pt to improve BLE strength by 1/2 MMG to improve safety with mobility  Additional Goals?: Yes  Long term goal 6: pt to improve Sitting and standing balance to FAIR or better to decrease risk for falls during mobility  Long term goal 7: pt to demo simulated or actual car transfer with SBA  Long term goal 8: pt to negotiate FF of steps with Bilateral Rail and SBA to allow for safe access to basement level of home. PLAN OF CARE  Frequency: 1-2 treatment sessions per day, 5-7 days per week  Physcial Therapy Plan  General Plan:  minutes of therapy at least 5 out of 7 days a week  Specific Instructions for Next Treatment: Progress ambulation, PD Disease specific exercise, postural training  Current Treatment Recommendations: Strengthening;ROM;Balance training;Functional mobility training;Transfer training;Gait training; Wheelchair mobility training;Stair training;Neuromuscular re-education;Pain management;Home exercise program;Safety education & training;Patient/Caregiver education & training;Equipment evaluation, education, & procurement;Positioning; Therapeutic activities  Safety Devices  Type of Devices: Call light within reach; Chair alarm in place;Gait belt;Patient at risk for falls; Heels elevated for pressure relief; All fall risk precautions in place; Left in chair;Nurse notified (Nurse Gaby Tompkins notified)         Therapy Time   12/16/22 1449   PT Individual Minutes   Time In 9042   Time Out 1510   Minutes [address] Kristie PTA, 12/16/22 at 4:57 PM

## 2022-12-16 NOTE — CONSULTS
Comprehensive Nutrition Assessment    Type and Reason for Visit:  Initial, Consult (Evaluate and Treat)    Nutrition Recommendations/Plan:   Continue current diet. Provide Glucerna twice daily. Malnutrition Assessment:  Malnutrition Status: Moderate malnutrition (12/16/22 1544)    Context:  Acute Illness     Findings of the 6 clinical characteristics of malnutrition:  Energy Intake:  75% or less of estimated energy requirements for 7 or more days  Weight Loss:  Greater than 5% over 1 month     Body Fat Loss:  Unable to assess     Muscle Mass Loss:  Unable to assess    Fluid Accumulation:  No significant fluid accumulation     Strength:  Not Performed    Nutrition Assessment:    Pt had spianl cord compression s/p surgical decompression/fusion L1-4 and now admitted to rehab. Pt and wife state pt has been eating approximately 50% of meals. Has lost wt with illness. Dislikes Ensure but is willing to receive Glucerna. No chewing or swallowing difficulty. Nutrition Related Findings:    No edema. Labs and meds reviewed. Wound Type: Surgical Incision       Current Nutrition Intake & Therapies:    Average Meal Intake: 26-50%, 51-75%     ADULT DIET; Regular    Anthropometric Measures:  Height: 6' (182.9 cm)  Ideal Body Weight (IBW): 178 lbs (81 kg)    Admission Body Weight: 231 lb 4.2 oz (104.9 kg)  Current Body Weight: 231 lb 4.2 oz (104.9 kg), 129.9 % IBW. Weight Source: Bed Scale  Current BMI (kg/m2): 31.4  Usual Body Weight: 245 lb (111.1 kg) (per pt and wife)  % Weight Change (Calculated): -5.6                    BMI Categories: Obese Class 1 (BMI 30.0-34. 9)    Estimated Daily Nutrient Needs:  Energy Requirements Based On: Formula  Weight Used for Energy Requirements: Admission  Energy (kcal/day): 7201-3475 based on Challenge-St Jeor with 1.2-1.3 factor  Weight Used for Protein Requirements: Ideal  Protein (g/day): 105-122 based on 1.3-1.5 gm per kg      Nutrition Diagnosis:   Moderate malnutrition related to inadequate protein-energy intake as evidenced by Criteria as identified in malnutrition assessment    Nutrition Interventions:   Food and/or Nutrient Delivery: Continue Current Diet  Nutrition Education/Counseling: No recommendation at this time  Coordination of Nutrition Care: Continue to monitor while inpatient       Goals:     Goals: PO intake 75% or greater       Nutrition Monitoring and Evaluation:   Behavioral-Environmental Outcomes: None Identified  Food/Nutrient Intake Outcomes: Food and Nutrient Intake, Supplement Intake  Physical Signs/Symptoms Outcomes: Biochemical Data, Weight, Skin    Discharge Planning:     Too soon to determine     Gianna Valdez RD, LD  Contact: [phone]

## 2022-12-16 NOTE — CARE COORDINATION
Willa Argueta RN   Registered Nurse   Acute Rehab   Care Coordination       Signed   Date of Service:  12/15/2022  9:31 AM                 Signed                    ACUTE INPATIENT [place] [address]  PRE-ADMISSION ASSESSMENT     Patient Name: Denae Sanchez        MRN:   <[id number]>    : 1955  (78 y.o.)  Gender: male   Ethnicity: Not , /a or Turkish Origin  Race: White     Admitted from:  Other Facility: [address]       Type of Admission:  New Admission      Date of Onset / Admission to the [address] [place] [address]:  22     Inpatient Rehabilitation Admitting Diagnosis:  Cord compression s/p surgical decompression/fusion L1-4      Did patient have surgery/procedures? Yes, As Listed Below   2022:   L1 through L4 level laminectomy and bilateral foraminotomy  L3-4 Angelo procedure   Non segmental fixation B7-4  Application of interbody fusion device L3-4   Removal of posterior instrumentation L4-5  Harvesting bone graft through same incision   Physicians:   Denilson Barakat MD  Physician   Neurosurgery      Risk for Clinical Complications:   Moderate      Co-morbidities:    Parkinsons   Atrial Fibrillation  OA  Financial Information  Primary insurance: Medicare      Secondary Insurance: Commercial Insurance:[employer]       Precautions:   [x]Cardiac Precautions: Stimulator with Battery, Left Chest  []Total hip precautions: No Total Hip Precautions  []Weight Bearing status: No Weight Bearing Restrictions  []Spinal precautions: Back brace when up or sitting   [x]Safety Precautions/Concerns:  Fall Risk, General Precautions, R GHADA drain  [x]Visually impaired: Yes: Glasses       []Hard of Hearing: Within Functional Limits        Communication Aids, Devices or Interpretation Services Required: None     Isolation Precautions:  None: Standard Precautions                  Physiatrist: Dr. Dayna Bridges       Patients Occupation: Retired     Reviewed Lab and Diagnostic reports from Current Admission: Yes     Patients Prior Functional  Level:   Patient independent with bed mobility, significant assist to rise from couch, modified independent ambulation with 4WW, independent for toileting, dressing and bathing except wife assists with drying, wife performs all household chores/shopping/driving     History of current illness, per PM&R Consult: Patient with spinal cord compression who had L1-4 laminectomy and fusion performed by Dr. Fortunato Shi formarked L1-2 stenosis and degenerative spondylolisthesis at L3-4 on 12/7/22. Medical records indicate some post-op confusion. Prognosis: Fair     Current functional status for upper extremity ADLs:   Dependent                                  Current functional status for lower extremity ADLs:   Dependent      Current functional status for bed, chair, wheelchair transfers:   Rolling: Min Assist  Supine to Sit: Mod Assist  Scooting: Min Assist  Sit to Stand:  Mod Assist x2  Stand to Sit: Mod Assist x2   Bed to Chair: Max A x2     Current functional status for toilet transfers:   Dependent     Current functional status for locomotion:   Ambulation distance 2ft with rolling walker, Max assist x2      Current functional status for comprehension: Exceptions to [facility]     Current functional status for expression: Exceptions to [facility]     Current functional status for social interaction: Exceptions to [facility]     Current functional status for problem solving: Exceptions to [facility]     Current functional status for memory: Exceptions to [facility]     Current Deficits R/T Impairment: Impaired Functional Mobility and Decreased ADLs     Required Therapy Services:  Physical Therapy: Yes  Occupational Therapy: Yes  Speech Therapy: May benefit from speech therapy services, patient to be screened upon admission       Additional Services:    [x] /Case Management    [] Recreational Therapy, as appropriate    [x] Nutrition Consult, as appropriate  [x] Dietary Needs/Preferences: No Specialized Dietary Needs/Preferences Identified  [] Dialysis  [x] Cultural/Anabaptist Needs/Preferences: No Specialized Cultural/Anabaptist Needs/Preferences Identified  [] Special Equipment Needs  [] Special Medication Needs  [x] Other information relevant to patient's care needs: R GHADA Drain, Back Brace     Preferred Language: English     Does the patient need or want an  to communicate with a doctor or health care staff? No     Rehab Justification:  Needs 3 hrs therapy per day or 15 hours per week:  Yes  Identified Rehab Nursing needs: Yes  Intense Interdisciplinary need:  Yes  Need for 24 hr physician supervision:  Yes  Measurable improved quality of life:  Yes  Willingness to participate:  Yes  Medical Necessity:  Yes  Patient able to tolerate care proposed:  Yes     Expected Discharge Destination/Functional Level:  Home with assist  Expected length of time to achieve that level of improvement: 1-2 weeks  Expected Post Discharge Treatments: Home with possible Home Care     Acute Inpatient Rehabilitation Disclosure Statement will be provided to patient upon admission to ARU with patient's verbalization of understanding. I have reviewed and concur with the findings and results of the pre-admission screening assessment completed by the Inpatient Rehabilitation Admissions Coordinator.                Cosigned by: Scott Miller MD at 12/15/2022  9:35 AM

## 2022-12-16 NOTE — PLAN OF CARE
Problem: Discharge Planning  Goal: Discharge to home or other facility with appropriate resources  Outcome: Progressing     Problem: Skin/Tissue Integrity  Goal: Absence of new skin breakdown  Description: 1. Monitor for areas of redness and/or skin breakdown  2. Assess vascular access sites hourly  3. Every 4-6 hours minimum:  Change oxygen saturation probe site  4. Every 4-6 hours:  If on nasal continuous positive airway pressure, respiratory therapy assess nares and determine need for appliance change or resting period. Outcome: Progressing     Problem: Safety - Adult  Goal: Free from fall injury  Outcome: Progressing     Problem: Pain  Goal: Verbalizes/displays adequate comfort level or baseline comfort level  Outcome: Progressing     Problem: Confusion  Goal: Confusion, delirium, dementia, or psychosis is improved or at baseline  Description: INTERVENTIONS:  1. Assess for possible contributors to thought disturbance, including medications, impaired vision or hearing, underlying metabolic abnormalities, dehydration, psychiatric diagnoses, and notify attending LIP  2. [place] high risk fall precautions, as indicated  3. Provide frequent short contacts to provide reality reorientation, refocusing and direction  4. Decrease environmental stimuli, including noise as appropriate  5. Monitor and intervene to maintain adequate nutrition, hydration, elimination, sleep and activity  6. If unable to ensure safety without constant attention obtain sitter and review sitter guidelines with assigned personnel  7.  Initiate Psychosocial CNS and Spiritual Care consult, as indicated  Outcome: Progressing     Problem: ABCDS Injury Assessment  Goal: Absence of physical injury  Outcome: Progressing

## 2022-12-16 NOTE — PROGRESS NOTES
Pharmacy Note  Warfarin Consult follow-up      Recent Labs     12/16/22  0621   INR 1.3     Recent Labs     12/16/22  0621   HGB 12.5*   HCT 35.9*          Significant Drug-Drug Interactions:  New warfarin drug-drug interactions: none  Discontinued drug-drug interactions: none  Current warfarin drug-drug interactions: sinemet, lexapro, rasagiline      Date             INR        Dose given previous day  Dose scheduled for today  12/16/2022            1.3       5 mg         5 mg        Notes:                   [address] [address] reportedly gave 5 mg yesterday before patient was admitted to [facility]. Daily PT/INR while inpatient.      Chasity Hurley RPH,PharmD,  12/16/2022, 5:53 PM

## 2022-12-16 NOTE — PROGRESS NOTES
Pharmacy Note  Warfarin Consult    Fabian Sharma is a 79 y.o. male for whom pharmacy has been consulted to manage warfarin therapy. Consulting Physician: Margot Vera  Reason for Admission: Spinal surgery    Warfarin dose prior to admission: 5 mg daily  Warfarin indication: Afib  Target INR range: 2-3     No past medical history on file. No results for input(s): INR in the last 72 hours. No results for input(s): HGB, HCT, PLT in the last 72 hours. Current warfarin drug-drug interactions: none      Date             INR        Dose   12/15/2022      ?     5 mg given today. Daily PT/INR while inpatient. Plan: Will get INR tomorrow and adjust dose, a 5 mg dose was given today. Thank you for the consult. Will continue to follow. Urszula Brown. Ph.  12/15/2022  7:51 PM

## 2022-12-16 NOTE — H&P
Physical Medicine & Rehabilitation History and Physical  [facility] Acute Rehabilitation Unit     Primary care provider: Medina Baron MD     Chief Complaint and Reason for Rehabilitation Admission:   ADL and Mobility deficits secondary to lumbar spinal cord compression    History of Present Illness:  Constantin Motley  is a 79 y.o. right-handed male admitted to the [address] [place] [address] unit on 12/15/2022. He was originally admitted to [facility] for lumbar stenosis and weakness BLEs. Dr. Ethel Jewell performed L1-4 laminectomy and fusion for marked stenosis and degenerative spondylolisthesis on 12/7/22. Patient was exhibiting some post-op confusion. He has known history of Parkinson's Disease treated by Dr. Cheryl Dennis. He is currently requiring assistance for self-care activities and mobility prompting this admission. Review of Systems:  CONSTITUTIONAL:  Denies fevers, chills, sweats or fatigue. EYES:  Denies diplopia, blind spots, blurring. HEENT:  Denies hearing loss, trouble chewing or swallowing. RESPIRATORY:  No wheezing, coughing, shortness of breath. CARDIOVASCULAR:  Denies chest pain, palpitations, lightheadedness. GASTROINTESTINAL:  Denies heartburn, nausea, constipation, diarrhea, abdominal pain. GENITOURINARY:  No urgency, frequency, incontinence, dysuria. ENDOCRINE:  Denies hot or cold intolerance. MUSCULOSKELETAL:  Denies focal joint pain, neck pain. NEUROLOGICAL:  Denies focal numbness, tingling, balance loss, headache. BEHAVIOR/PSYCH:  Denies depression, anxiety, memory loss, insomnia. SKIN:  No ulcers, rash, bruises.       Premorbid function:  Modified independent    Current Function:  PT:    Restrictions:  Restrictions/Precautions  Restrictions/Precautions: Surgical Protocols, Fall Risk, General Precautions, Up as Tolerated  Required Braces or Orthoses?: Yes  Implants present? : Metal implants, Deep brain stimulator (Spinal Fusion and B TKAs)    Bed mobility  Supine to Sit: Moderate assistance (A with trunk)  Bed Mobility Comments: Bed mobility is not assessed this AM as pt is already up seated in wheelchair and retires to recliner at end of session     Transfers  Sit to Stand: Moderate Assistance  Stand to Sit: Moderate Assistance  Stand Pivot Transfers: Minimal Assistance  Comment: RW for multiple transfers (including toilet transfer); pt demos excessive posterior leaning upon rising and returning to sit. maximal VVT cues for safety, hands placement, and anterior weightshifting with limited carry over. Ambulation  Surface: Level tile  Device: Rolling Walker  Assistance: Moderate assistance (Mod A mainly for backward stepping)  Quality of Gait: Fwd amb: forward trunk, NBOS, mildly unsteady; Upon attempting to step backwards pt impulsivley and quickly starts back-stepping with heavy posterior lean and backs into wheelchair. Gait Deviations: Decreased step height, Decreased step length, Decreased head and trunk rotation, Slow Katey, Shuffles  Distance: 3 feet x 3  Comments: worked on transfer and ambulation at bedside for technique safety from varied surfaces and surfaces height. spouse present and discussed mobility for couch height. More Ambulation?: Yes  Ambulation 2  Surface - 2: level tile  Device 2: Parallel Bars  Assistance 2: 2 Person assistance, Minimal assistance, Stand by assistance (Rajni for RW management and steadying support; +SBA for wheelchair follow)  Quality of Gait 2: forward trunk, very NBOS, unsteady, intermittent scissor gait (especially during turning), Flatfoot initial contact bilaterally, and difficulty steering RW straight and with turns  Gait Deviations: Decreased step length, Decreased step height, Decreased head and trunk rotation, Shuffles, Slow Katey  Distance: 104'  Comments: Maximal VVT cues for posture, step width, straightening knees, and maintaining forward gaze. Dandreryveena Moder  all of which the patient has difficulty maintaining for > 5 seconds when focusing to correct deviations. OT:   ADLs-  ADL  Feeding: Stand by assistance  Feeding Skilled Clinical Factors: per wife report  Grooming: Minimal assistance  Grooming Skilled Clinical Factors: Min A with LUE to hold toothbrush due to pt dropping into sink mulitple times  UE Bathing: Maximum assistance  UE Bathing Skilled Clinical Factors: OT provided max verbal cues on this date to complete upper body bathing task with pt only able to minimally participate perserverating on washing the sink. [place] for minimal participation in task. Therapist A to complete. LE Bathing: Maximum assistance  LE Bathing Skilled Clinical Factors: Pt able to wash bilateral thighs while sitting in wheelchair. Pt required A with bilateral lower legs . Mod A for standing balance with therapist A to complete bolivar/bottom hygiene  UE Dressing: Maximum assistance  UE Dressing Skilled Clinical Factors: A for orientation of shirt with increased time. Pt able to find one sleeve with increased time and verbal cues. Pt required therapist A with threading LUE, putting short over head, and pulling down over chest and back. A with doffing/donning lumbar corset. LE Dressing: Dependent/Total  LE Dressing Skilled Clinical Factors: Therapist complete threading BLE and pulling up over hips. Mod A while standing for balance. Toileting: Dependent/Total  Toileting Skilled Clinical Factors: 2 person A while standing with A with clothing management and hygiene  Additional Comments: OT facilitated pts engagement in self care tasks on this date. Pt required max verbal/tactile cues with fair carryover. Pt currenlty limited due to decreased strength, FMC, balance, activity tolerance, and cognitive barriers impacting safety and independence with self care tasks.       SPEECH:  Francisco Isabel was referred for a bedside swallow evaluation to assess the efficiency of his swallow function, identify signs and symptoms of aspiration and make recommendations regarding safe dietary consistencies, effective compensatory strategies, and safe eating environment. Impression  Dysphagia Diagnosis: Suspected needs further assessment  Dysphagia Impression : Pt presents with probable safe swallow for Regular diet with thin liquids as was evidenced by no overt s/s of aspiration with consistencies tested. ST to continue to monitor diet tolerance closely as Pt performance likely to fluctuate given progressive nature of Parkinson's Disease and c/o occasional coughing with PO when changes in medications occur or when more lethargic. Recommend small sips and bites, only feed when alert and awake and upright at 90 degrees for all PO intake. Recommend close monitoring for overt/clinical s/s of aspiration and D/C PO intake and complete Modified Barium Swallow Study should they occur. Results and recommendations reported to RN. Dysphagia Outcome Severity Scale: Level 6: Within functional limits/Modified     Cognitive Diagnosis: Pt presents with a mild-moderate cognitive deficit characterized by impaired following complex commands, verbal reasoning, problem solving and sequencing. Short-term and working memory judged to be moderate-severely impaired. Pt and wife report gradual decline in cognition over the years. Per wife, specifically Pt has difficulty with memory. Pt's wife completes all time, medication and financial management at home. Speech Diagnosis: Pt presents with moderate dysarthria characterized by imprecise articulation, blended word boundaries, decreased breath support for speech, reduced vocal intensity (declining with duration) and reduced rate. O/M deficits present include reduced lingual and labial strength and ROM. Diagnosis: ST recommended to target cognition and motor speech disorder, with focus on compensatory strategy training d/t progressive nature of Parkinson's Disease.     Past Medical History:      Diagnosis Date    Anxiety Asthma     Atrial fibrillation (HCC)     Back pain     BPH (benign prostatic hyperplasia)     Dental disease     Depression     Diarrhea     MALONE (dyspnea on exertion)     Frequent falls     GERD (gastroesophageal reflux disease)     Memory loss     Obesity     Panic disorder     Parkinson's disease ([address]     Testicular cancer ([facility] [address]     Visual impairment        Past Surgical History:      Procedure Laterality Date    BRAIN SURGERY      Neurostimulator implant for Parkinson's    BROW LIFT      CARPAL TUNNEL RELEASE Right     CHOLECYSTECTOMY      COLONOSCOPY      CYSTOSCOPY      HERNIA REPAIR      LUMBAR LAMINECTOMY      LYMPH NODE DISSECTION      ORCHIECTOMY      SHOULDER ARTHROSCOPY Right     TOTAL KNEE ARTHROPLASTY Bilateral     TURP      WRIST SURGERY Left     cyst removal       Allergies:    Patient has no known allergies. Medications   Scheduled Meds:   [START ON 12/17/2022] mirabegron  50 mg Oral Nightly    carbidopa-levodopa  1 tablet Oral BID    carbidopa-levodopa  1 tablet Oral BID    escitalopram  20 mg Oral Daily    rasagiline mesylate  1 mg Oral Daily    polyethylene glycol  17 g Oral Daily    warfarin placeholder: dosing by pharmacy   Other RX Placeholder     Continuous Infusions:  PRN Meds:.oxyCODONE-acetaminophen, tiZANidine, acetaminophen, senna, bisacodyl     Social History:  Lives With: Spouse (Danay)  Type of Home: House  Home Layout: One level, Laundry in basement [employer] does laundry. Per Othel Factor he will access basement to use the computer)  Home Access: Stairs to enter without rails  Entrance Stairs - Number of Steps: 1 platform step + 2 Steps with no Railings to enter;  To basement level has stairs with landing and Bilateral Railings (\"Steps are curved\" per spouse)  Bathroom Shower/Tub: Walk-in shower, Doors  Bathroom Toilet: Standard  Bathroom Equipment: Hand-held shower, Grab bars in shower, Built-in shower seat, Grab bars around toilet  Bathroom Accessibility:  (Not accessible by 6RZ)  Home Equipment: [place] Velazco, 4 wheeled, [place], quad, [place], [place], KOGL bars, Walker, standard (brakes do not work on 4WW)  Has the patient had two or more falls in the past year or any fall with injury in the past year?: Yes (\"too many to guess\" pt reports most falls occurring at home while \"trying to maneuver around things\")  Receives Help From: Family, Neighbor  ADL Assistance: Needs assistance  Homemaking Assistance: Needs assistance (wife as primary)  Homemaking Responsibilities: No  Ambulation Assistance: Needs assistance (8LC; per Wife pt often times abandons walker. 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 Pt appears to be somewhat of a poor historian this date. Othel Factor arrives to help verify information. Social History     Socioeconomic History    Marital status:      Spouse name: None    Number of children: None    Years of education: None    Highest education level: None   Tobacco Use    Smoking status: Never    Smokeless tobacco: Never       Family History:   No family history on file. CARLYN due to patient's impaired cognition/memory    Diagnostics:     CBC:   Recent Labs     12/16/22  0621   WBC 7.0   RBC 3.72*   HGB 12.5*   HCT 35.9*   MCV 96.7   RDW 13.8        BMP:    Recent Labs     12/16/22  0621   GLUCOSE 95   BUN 23   CREATININE 0.80   CALCIUM 9.7      K 3.9      CO2 25   ANIONGAP 12   LABGLOM >60     HbA1c: No results found for: LABA1C  BNP: No results for input(s): BNP in the last 72 hours. PT/INR:   Recent Labs     12/16/22 0621   PROTIME 15.7*   INR 1.3     APTT: No results for input(s): APTT in the last 72 hours. CARDIAC ENZYMES: No results for input(s): CKMB, CKMBINDEX, TROPONINT, TROPHS, TROPII in the last 72 hours. Invalid input(s): CKTOTAL;3   FASTING LIPID PANEL:No results found for: CHOL, HDL, TRIG  LIVER PROFILE:   Recent Labs     12/16/22 0621   AST 35   ALT 44*   BILIDIR 0.2   BILITOT 0.7   ALKPHOS 96          Radiology:    No results found. None available from [facility]    Physical Exam:  BP (!) 159/87   Pulse (!) 122   Temp 97.7 °F (36.5 °C)   Resp 16   Ht 6' (1.829 m)   Wt 231 lb 4.8 oz (104.9 kg)   SpO2 96%   BMI 31.37 kg/m²     GEN: Well developed, well nourished, in NAD  HEENT:  NCAT. PERRL. EOMI. Mucous membranes pink and moist.   PULM:  Clear to ausculation. No rales or rhonchi. Respirations WNL and unlabored. CV:  Regular rate rhythm. No murmurs or gallops. GI:  Abdomen soft. Nontender. Non-distended. BS + and equal.    NEUROLOGICAL: A&O x3. Sensation intact to light touch. DTRs 2+. Resting tremor. Bradykinesia. Masked facies, hypophonia. MSK:  Functional ROM BUEs, impaired AROM BLEs due to weakness. Motor testing 4+/5 key muscles BUEs, 3/5 key muscles BLEs. [facility] SKIN: Warm dry and intact. Good turgor. Posterior spine incision with staples  EXTREMITIES:  No calf tenderness to palpation. No edema BLEs. PSYCH: Mood WNL. Appropriately interactive. Affect WNL. Principal Diagnosis/plan:  The patient is a 79y.o. year old with ADL and Mobility deficits secondary to Lumbar spinal cord compression    He will require close medical monitoring for the comorbidities listed below. He will benefit from intensive interdisciplinary therapies and rehab nursing care and is appropriate for inpatient rehabilitation.   The post admission physician evaluation (DEJAH) is consistent with the pre-admission assessment. See above findings to reflect the elements required in the DEJAH. Patient's admitting condition is consistent with the findings of the preadmission assessment by the rehabilitation admissions coordinator. Diagnoses/plan:    Lumbar spinal cord compression with B paraparesis:  PT/OT for gait, mobility, strengthening, endurance, ADLs, and self care. S/p lumbar decompression by Dr. Manley Most 12/7. OK to shower. Has Percocet prn pain and tizanidine prn spasm. Parkinson's Disease: treats with Dr. Macho Gould. On carbidopa-levodopa CR and IR, on rasagiline. Takes [place] at home - wife may bring in home supply. Depression: on escitalopram  OAB: on mirabegron. Has outpatient urology follow up. Atrial fibrillation: on warfarin. Titrating to therapeutic INR 2-3. Pharmacy dosing. INR subtherapeutic today  Bowel Management: Miralax daily, senokot prn, dulcolax prn. DVT Prophylaxis:  Coumadin with INR goal 2-3, SCD's while in bed, and OUSMANE's during the day  Internal medicine for medical management      Estimated Length of Stay:  2 weeks. Prognosis  fair    Goals    Home at Supervision  Supervision at Discharge: Kailyn Ernandez MD     This note is created with the assistance of a speech recognition program.  While intending to generate a document that actually reflects the content of the visit, the document can still have some errors including those of syntax and sound a like substitutions which may escape proof reading. In such instances, actual meaning can be extrapolated by contextual diversion.

## 2022-12-16 NOTE — CONSULTS
[address] Internal Medicine  Jared Doss MD; Jen Valverde MD; Radha Veras MD; MD Pepper Barahona MD; Khurram Bah MD    [facility]   [facility]    HISTORY AND PHYSICAL EXAMINATION            Date:   12/16/2022  Patient name:  Alejandro Lucero  Date of admission:  12/15/2022  5:57 PM  MRN:   [id number]  Account:  [de-identified]  YOB: 1955  PCP:    Troy Singh MD  Room:   [address] [place] [address]  Code Status:    Full Code    Chief Complaint:       Back pain from spinal stenosis s/p laminectomy    History Obtained From:     Patient and electronic medical record    History of Present Illness:     Alejandro Lucero is a 79 y.o. Unavailable / unknown male who has past medical history of a . Fib and parkinson presents with back pain from spinal cord compression s/p laminectomy. He is admitted for acute rehabilitation. Laminectomy done at [facility] 1 week back. Currently patient denied back pain. He dose have weakness in bilateral lower extremity 4/5. Past Medical History:     History reviewed. No pertinent past medical history. Past Surgical History:     No past surgical history on file. Medications Prior to Admission:     Prior to Admission medications    Not on File        Allergies:     Patient has no known allergies. Social History:     Tobacco:    reports that he has never smoked. He has never used smokeless tobacco.  Alcohol:      has no history on file for alcohol use. Drug Use:  has no history on file for drug use. Family History:     No family history on file. Review of Systems:     Review of Systems   Constitutional: Negative. HENT: Negative. Eyes: Negative. Respiratory: Negative. Cardiovascular: Negative. Gastrointestinal: Negative. Endocrine: Negative. Genitourinary: Negative. Musculoskeletal: Negative. Bilateral lower extremity weakness.    Skin: Negative. Allergic/Immunologic: Negative. Neurological: Negative. Hematological: Negative. Psychiatric/Behavioral: Negative. Luzmaria Huggins Physical Exam:     Physical Exam   Vitals:    Vitals:    12/15/22 1948 12/15/22 2123 22 0618   BP: 132/89  (!) 159/87   Pulse: 67  (!) 122   Resp: 18 16 16   Temp: 98.5 °F (36.9 °C)  97.7 °F (36.5 °C)   TempSrc: Oral     SpO2: 95%  96%   Weight: 231 lb 4.8 oz (104.9 kg)     Height: 6' (1.829 m)                      Body mass index is 31.37 kg/m². Temp (24hrs), Av.1 °F (36.7 °C), Min:97.7 °F (36.5 °C), Max:98.5 °F (36.9 °C)    No results for input(s): POCGLU in the last 72 hours. General Appearance:   well apearing             Skin:                             No rash or erythema  HEENT ;                                                                       No icterus, no pallor . No ptosis. No gross asymmetry  Or abnormality face         Neck:                            No mass , no thyroid enlargement                                           Pulmonary/Chest:                                               Symmetric                                          Clear to auscultation bilaterally . No wheezes,                                          No rales or rhonchi . No abnormality on percussion                                                        Cardiovascular:            Normal rate, regular rhythm,                                          No murmur or  Gallop . Abdomen:                       Soft, non-tender                                           Normal bowels sounds,                                             Extremities:                    normal sensation.  Power 4/5 in bilateral lower extremities                                           Musculo-skeletal / Neurological ;; Investigations:      URINE ANALYSIS: No results found for: LABURIN     CBC:  Lab Results   Component Value Date/Time    WBC 7.0 12/16/2022 06:21 AM    HGB 12.5 12/16/2022 06:21 AM     12/16/2022 06:21 AM             BMP:    Lab Results   Component Value Date/Time     12/16/2022 06:21 AM    K 3.9 12/16/2022 06:21 AM     12/16/2022 06:21 AM    CO2 25 12/16/2022 06:21 AM    BUN 23 12/16/2022 06:21 AM    CREATININE 0.80 12/16/2022 06:21 AM    GLUCOSE 95 12/16/2022 06:21 AM      LIVER PROFILE:  Lab Results   Component Value Date/Time    ALT 44 12/16/2022 06:21 AM    AST 35 12/16/2022 06:21 AM    PROT 7.0 12/16/2022 06:21 AM    BILITOT 0.7 12/16/2022 06:21 AM    BILIDIR 0.2 12/16/2022 06:21 AM    LABALBU 4.0 12/16/2022 06:21 AM             @BRIEFLABT(TSH)@      Laboratory Testing:  Recent Results (from the past 24 hour(s))   Basic Metabolic Panel w/ Reflex to MG    Collection Time: 12/16/22  6:21 AM   Result Value Ref Range    Glucose 95 70 - 99 mg/dL    BUN 23 8 - 23 mg/dL    Creatinine 0.80 0.70 - 1.20 mg/dL    Est, Glom Filt Rate >60 >60 mL/min/1.73m2    Calcium 9.7 8.6 - 10.4 mg/dL    Sodium 140 135 - 144 mmol/L    Potassium 3.9 3.7 - 5.3 mmol/L    Chloride 103 98 - 107 mmol/L    CO2 25 20 - 31 mmol/L    Anion Gap 12 9 - 17 mmol/L   Hepatic function panel    Collection Time: 12/16/22  6:21 AM   Result Value Ref Range    Albumin 4.0 3.5 - 5.2 g/dL    Alkaline Phosphatase 96 40 - 129 U/L    ALT 44 (H) 5 - 41 U/L    AST 35 <40 U/L    Total Bilirubin 0.7 0.3 - 1.2 mg/dL    Bilirubin, Direct 0.2 <0.3 mg/dL    Bilirubin, Indirect 0.5 0.0 - 1.0 mg/dL    Total Protein 7.0 6.4 - 8.3 g/dL   CBC auto differential    Collection Time: 12/16/22  6:21 AM   Result Value Ref Range    WBC 7.0 3.5 - 11.0 k/uL    RBC 3.72 (L) 4.5 - 5.9 m/uL    Hemoglobin 12.5 (L) 13.5 - 17.5 g/dL    Hematocrit 35.9 (L) 41 - 53 %    MCV 96.7 80 - 100 fL    MCH 33.7 26 - 34 pg    MCHC 34.9 31 - 37 g/dL    RDW 13.8 11.5 - 14.9 %    Platelets 381 024 - 442 k/uL    MPV 7.0 6.0 - 12.0 fL    Seg Neutrophils 59 36 - 66 %    Lymphocytes 28 24 - 44 %    Monocytes 8 (H) 1 - 7 %    Eosinophils % 4 0 - 4 %    Basophils 1 0 - 2 %    Segs Absolute 4.10 1.3 - 9.1 k/uL    Absolute Lymph # 2.00 1.0 - 4.8 k/uL    Absolute Mono # 0.60 0.1 - 1.3 k/uL    Absolute Eos # 0.30 0.0 - 0.4 k/uL    Basophils Absolute 0.10 0.0 - 0.2 k/uL   Protime-INR    Collection Time: 12/16/22  6:21 AM   Result Value Ref Range    Protime 15.7 (H) 11.8 - 14.6 sec    INR 1.3        Imaging/Diagnostics:  No results found. Assessment :      Hospital Problems             Last Modified POA    * (Principal) Cord compression [facility]) 12/15/2022 Yes   Taylor Angel is a 79 y.o. Unavailable / unknown male who has past medical history of a . Fib and parkinson presents with back pain from spinal cord compression s/p laminectomy. He is admitted for acute rehabilitation. Laminectomy done at [facility] 1 week back. Plan:       Medications: Allergies:  No Known Allergies    Current Meds:   Scheduled Meds:    carbidopa-levodopa  1 tablet Oral BID    escitalopram  20 mg Oral Daily    mirabegron  50 mg Oral Daily    rasagiline mesylate  1 mg Oral Daily    polyethylene glycol  17 g Oral Daily    warfarin placeholder: dosing by pharmacy   Other RX Placeholder     Continuous Infusions:   PRN Meds: oxyCODONE-acetaminophen, tiZANidine, acetaminophen, senna, bisacodyl          12/16/2022    Admitted for acute rehab after laminectomy for spinal stenosis   Hemodynamically stable, saturating well in room air. Continue physical therapy and occupation therapy. 2. Disposition       Consultations:   IP CONSULT TO DIETITIAN  IP CONSULT TO SOCIAL WORK  IP CONSULT TO INTERNAL MEDICINE  PHARMACY TO DOSE WARFARIN        Anjana Cervantes MD  12/16/2022    [address]    Phone [phone]   Fax: [phone]  Answering Service: [phone] and add on       I have discussed the care of Amarilis Scott , including pertinent history and exam findings,      12/16/22    with the resident. I have seen and examined the patient and the key elements of all parts of the encounter have been performed by me . I agree with the assessment, plan and orders as documented by the resident. Malorie Nichols MD      [address] Phone [phone]   Fax: [phone]  Answering Service: [phone]            12/16/2022  9:49 AM    Copy sent to Dr. Johan Huang MD    Please note that this chart was generated using voice recognition Dragon dictation software. Although every effort was made to ensure the accuracy of this automated transcription, some errors in transcription may have occurred.

## 2022-12-16 NOTE — PLAN OF CARE
Problem: Safety - Adult  Goal: Free from fall injury  Outcome: Progressing     Problem: Skin/Tissue Integrity  Goal: Absence of new skin breakdown  Description: 1. Monitor for areas of redness and/or skin breakdown  2. Assess vascular access sites hourly  3. Every 4-6 hours minimum:  Change oxygen saturation probe site  4. Every 4-6 hours:  If on nasal continuous positive airway pressure, respiratory therapy assess nares and determine need for appliance change or resting period.   Outcome: Progressing     Problem: Pain  Goal: Verbalizes/displays adequate comfort level or baseline comfort level  Outcome: Progressing     Problem: Discharge Planning  Goal: Discharge to home or other facility with appropriate resources  Outcome: Progressing

## 2022-12-16 NOTE — CARE COORDINATION
CASE MANAGEMENT NOTE:    Admission Date:  12/15/2022 Corby Mao is a 79 y.o.  male  Admitted for : Unspecified cord compression [G95.20]  Cord compression ([address] [G95.20]    Do you have a Legal Guardian? No    Do you have a [address]? No    Met with:  Patient  PCP: Arnold Betancourt MD                       Insurance:  Medicare and [facility]    Is patient alert and oriented at time of discussion:  Yes    Current Residence/ Living Arrangements:  independently at home           Does patient have 24 hour assistance at home:  yes    Current Services PTA:  No    Does patient go to outpatient dialysis: No  If yes, location and chair time: no    Is patient agreeable to VNS/Outpatient therapy NA    Barnes of choice provided:  yes    List of Home Care Agencies/Outpatient therapy provided: Yes    VNS/Outpatient therapy chosen:  will update me    DME:  walker  Do you have a wheelchair ramp/Plans to build? No  Home Oxygen: No  Nebulizer: No  CPAP/BIPAP: No  Supplier: N/A    Handicap Placard: has     Potential Assistance Needed: Yes    Pharmacy:  [facility]       Does Patient want to use MEDS to BEDS? No    Is patient currently receiving oral anticoagulation therapy? yes    Family Members/Caregivers that pt would like involved in their care: If yes, list name here:  Mernadacia Stiles    Who Should Physical Therapy contact for Physical Therapy Family training? yes  Phone Number? Nick Pisano [phone]      Transportation Provider:  Patient             Discharge Plan:  Dispo: home w/wife DME: yvonne Placard : has PHQ9 0               Electronically signed by: Shawn Iglesias RN on 12/16/2022 at 8:33 AM          Clara Garcia    Case Management Assessment: IRF EDWAR 4.0   If score is above 15, Notify PM&R Physician    Patient Health Questionnaire-9 (PHQ-2 to 9)   Over the last 2 weeks, how often have you been bothered by any of the following problems? 1.  Little Interest or pleasure in doing things? Never or 1 Day - Score 0    2. Feeling down, depressed or hopeless? Never or 1 Day - Score 0    3. Trouble falling or staying asleep, or sleeping too much? Never or 1 Day - Score 0    4. Feeling tired or having little energy? Never or 1 Day - Score 0    5. Poor apettite or overeating? Never or 1 Day - Score 0    6. Feeling bad about yourself-or that you are a failure or have let yourself or your family down? Never or 1 Day - Score 0    7. Trouble concentrating on things, such as reading the newspaper or watching television? Never or 1 Day - Score 0    8. Moving or speaking so slowly that other people could have noticed? Or the opposite-being so fidgety or restless that you have been moving around a lot more than usual?   Never or 1 Day - Score 0    9. Thoughts that you would be better off dead or of hurting yourself in some way? Never or 1 Day - Score 0    Total Score: 0    If you checked off any problems, how difficult have these problems made it for you to do your work, take care of things at home, or get along with other people? [x] Not difficult at all     [] Somewhat Difficult     [] Very Difficult     [] Extremely Difficult           Social Isolation     How often do you feel lonely or isolated from those around you? [x] Never  [] Rarely  [] Sometimes  [] Often  [] Always  [] Patient Unable to Respond       Access To Transportation     2. In the past six months to a year, has lack of transportation kept you from medical appointments or from getting your medications?  No    3. In the past six months to a year, has lack of transportation kept you from non-medical meetings, appointments, work, or from getting things that you need?  No

## 2022-12-16 NOTE — PROGRESS NOTES
[address]    Patient Name: Siomara Gonsalez  MRN: [id number]   Date of Admit: 12/15/2022  Time of Arrival: [address]    Patient admitted to the Acute Inpatient Rehabilitation Unit via Stretcher    Patient oriented to room, unit and fall prevention safety measures. Education provided on the rehabilitation routine and therapy schedules. Drug / Medication Regimen Review   Admitting medication orders compared with acute stay medications; home medication list reviewed with patient/family. Medication Issues Identified ? No If Yes, Check All That Apply   []  Allergy to medication   []  Drug interactions (drug/drug, drug/food, drug/disease interactions)   []  Duplicate drug   []  Omission (drug missing from prescribed regimen)   []  Non adherence   []  Adverse reaction   []  Wrong patient, drug, dose, route, time error   []  Ineffective drug therapy       High-Risk Drug Classes: Use and Indication   Check if the patient is taking any medications by pharmacological classification If yes, check if there is an indication noted for all meds in the drug class   Antipsychotic No If yes: indication noted? [] If no indication noted, follow up with provider for order clarification   Anticoagulant Yes If yes: indication noted? []    Antibiotic No If yes: indication noted? []    Opioid Yes If yes: indication noted? []    Antiplatelet No If yes: indication noted? []    Hypoglycemic (Including Insulin) No If yes: indication noted? []      Attending Physical Medicine & Rehabilitation (PM&R) Admitting Order Review   Admission orders reviewed with Acute Inpatient Rehabilitation Attending PM&R Physician: Moreno Venegas MD     Skin Assessment   Skin assessment performed by two nurses: all active alterations in skin integrity, wounds, lines, drains and airways assessed, measured, recorded and reconciled. Refer to [facility] avatar and LDA flowsheet for additional information.     Nurse 1 Completing Skin Assessment JENNIFER Tovar   Nurse 2 Completing Skin Assessment WENDY Kelly     Active pressure injuries or complex wounds identified? No  If yes: contact provider for wound care consult order []       Admission Weight   Patient's Weight Upon Admission  232lb       Bowel and Bladder Functional Assessment   Prior history of bladder problems:  no problems with bladder   Number of pads used per day: Unknown at this time   Frequency of night time voiding: Unknown at this time   Fluid intake volume and pattern: Unknown at this time   Last Bowel Movement 12/14/22   Prior history of bowel problems:  No If Yes, Check All That Apply  []Incontinence   []Frequent Diarrhea  []Constipation   []Hemorrhoids  []Diverticulitis  []Bowel Surgery     Pain Assessment   Over the past 5 days, how much of the time has pain made it hard for you to sleep at night? Rarely or not at all   Over the past 5 days, how often have you limited your participation in rehabilitation therapy sessions due to pain? Rarely or not at all   Over the past 5 days, how often have you limited your day-to-day activities (excluding rehabilitation therapy session)? Rarely or not at all     Special Treatments, Procedures, and Programs   Check all of the following treatments, procedures, and programs that apply on admission.     Cancer Treatments   Chemotherapy No   If Yes, Check All That Apply   []IV Chemotherapy   []Oral Chemotherapy    []Chemotherapy    Radiation No   Respiratory Therapies   Oxygen Therapy No  If Yes, Check All That Apply   []Continuous   []Intermittent   []High-Concentration    Suctioning No  If Yes, Check All That Apply   []Scheduled   []As Needed   Tracheostomy Care No   Invasive Mechanical Ventilator   (Ventilator or Respirator) No  If Yes, Check All That Apply   []Non-invasive Mechanical Ventilator   []BiPap   []CPAP   Other   IV Medications No  If Yes, Check All That Apply   []IV Vasoactive Medications   []IV Antibiotics []IV Anticoagulation   []Other IV Medications   Transfusions No   Dialysis No  If Yes, Check All That Apply   []Hemodialysis   []Peritoneal Dialysis   IV Access No  If Yes, Check All That Apply   []Peripheral   []Midline   [](PICC, tunneled, port)       Admission folder with the following documents provided to patient/responsible party:   1. \"[facility] [address] Individualized Disclosure Statement\"  2. \"Data Collection Information Summary for Patients in Inpatient Rehabilitation Facilities\"  3. \"Privacy Act Statement - Health Care Records\"    Care plan was created with patient/responsible party input and goals were agreed upon. Please refer to the admission navigator for further information.

## 2022-12-16 NOTE — PROGRESS NOTES
Speech Language Pathology  Facility/Department: [address] [place] [address]  Initial Speech/Language/Cognitive Assessment    NAME: Gilbert Olivares  : 1955   MRN: [id number]  ADMISSION DATE: 12/15/2022  ADMITTING DIAGNOSIS: has Atrial fibrillation ([address] Chronic back pain; Erectile dysfunction; Gait disorder; Hypertension; Lower urinary tract symptoms; Malignant neoplasm of testis ([address] Osteoarthrosis; Parkinson disease ([address] Peripheral nerve disease; Cord compression ([address] and Moderate malnutrition ([address] on their problem list.    Date of Eval: 2022   Evaluating Therapist: FOREST Frederick    RECENT RESULTS  CT OF HEAD/MRI:     Primary Complaint: Per PM&R Consult:   Patient with spinal cord compression who had L1-4 laminectomy and fusion performed by Dr. Devonte Gan formarked L1-2 stenosis and degenerative spondylolisthesis at L3-4 on 22. Medical records indicate some post-op confusion. Pain:  Pain Assessment  Pain Assessment: 0-10  Pain Level: 2  Patient's Stated Pain Goal: 4    Vision/ Hearing  Vision  Vision Exceptions: Wears glasses at all times  Hearing  Hearing: Within functional limits    Assessment:  Cognitive Diagnosis: Pt presents with a mild-moderate cognitive deficit characterized by impaired following complex commands, verbal reasoning, problem solving and sequencing. Short-term and working memory judged to be moderate-severely impaired. Pt and wife report gradual decline in cognition over the years. Per wife, specifically Pt has difficulty with memory. Pt's wife completes all time, medication and financial management at home. Speech Diagnosis: Pt presents with moderate dysarthria characterized by imprecise articulation, blended word boundaries, decreased breath support for speech, reduced vocal intensity (declining with duration) and reduced rate. O/M deficits present include reduced lingual and labial strength and ROM.    Diagnosis: ST recommended to target cognition and motor speech disorder, with focus on compensatory strategy training d/t progressive nature of Parkinson's Disease. Recommendations:  Recommendations  Requires SLP Intervention: Yes  Patient Education: Yes  Patient Education Response: Verbalizes understanding;Demonstrated understanding             Plan:   Individuals consulted  Consulted and agree with results and recommendations: Patient; Family member  Family member consulted: Wife    Goals:  Short Term Goals  Goal 1: Pt will complete verbal reasoning tasks with 80% accuracy  Goal 2: Pt will complete problem solving tasks with 80% accuracy  Goal 3: Pt will complete thought organizational tasks (e.g., following complex commands, sequencing) with 80% accuracy  Goal 4: Pt will recall daily events, 3-5 units and pargraph recall with 60% accuracy  Goal 5: Increase Pt and family education re: compensatory dysarthria/voicing strategies and compensatory strategies to facilitate recall with 80% returned demonstration   Patient/family involved in developing goals and treatment plan: Yes, Pt and wife    Subjective:   Previous level of function and limitations: Assist from wife         Vision  Vision Exceptions: Wears glasses at all times  Hearing  Hearing: Within functional limits           Objective:       Oral Motor   Labial: Decreased rate; Impaired coordination  Dentition: Intact  Oral Hygiene: Moist;Clean  Lingual: Decreased rate;Decreased strength    Motor Speech  Apraxic Characteristics: None  Dysarthric Characteristics: Blended word boundaries; Decreased breath support;Phonation/respiration incoordination;Decreased rate; Imprecise  Intelligibility: Impaired  Conversation Intelligibility (%): 70 %  Overall Impairment Severity: Moderate    Auditory Comprehension  Comprehension: Exceptions  One Step Commands: WFL (3/3)  Two Step Commands: WFL (3/3)  Complex/Abstract Commands: Mild (2/3)  Conversation: Mild         Expression  Primary Mode of Expression: Verbal    Verbal Expression  Verbal Expression: Within functional limits  Automatic Speech: WFL (100%)  Confrontation: WFL (6/6)  Responsive: WFL (3/3)  Conversation: WFL                   Cognition:      Orientation  Overall Orientation Status: Impaired  Orientation Level: Oriented to person;Oriented to place;Oriented to situation;Disoriented to time  Memory  Memory: Exceptions to [facility]  Short-term Memory: Severe (0/3 amrita, 3/3 c max cues)  Working Memory: Moderate (immediate recall- 3/3 amrita. mental manipulation (3 units, word order)- 2/4 amrita, 4/4 cued. paragraph recall- 3/5 amrita, 5/5 cued.)  Problem Solving  Problem Solving: Exceptions to [facility]  Simple Functional Tasks: Moderate  Verbal Reasoning Skills: Mild  Sequencing: Moderate  Abstract Reasoning  Abstract Reasoning: Exceptions to [facility]  Convergent Thinking: WFL (5/5)  Divergent Thinking: Moderate (8 items in 60 seconds)    Additional Assessments:  See separate clinical bedside swallowing evaluation. Prognosis:  Individuals consulted  Consulted and agree with results and recommendations: Patient; Family member  Family member consulted:  Wife    Education:  Patient Education: Yes  Patient Education Response: Verbalizes understanding;Demonstrated understanding          Therapy Time:   Individual Concurrent Group Co-treatment   Time In 1504         Time Out 1530         Minutes 26                 Electronically signed by Genesis Cervantes M.A., [id number] [facility] on 12/16/2022 at 4:03 PM

## 2022-12-17 LAB
INR BLD: 1.5
PROTHROMBIN TIME: 17.8 SEC (ref 11.8–14.6)

## 2022-12-17 PROCEDURE — 97110 THERAPEUTIC EXERCISES: CPT

## 2022-12-17 PROCEDURE — 97530 THERAPEUTIC ACTIVITIES: CPT

## 2022-12-17 PROCEDURE — 97535 SELF CARE MNGMENT TRAINING: CPT

## 2022-12-17 PROCEDURE — 6370000000 HC RX 637 (ALT 250 FOR IP): Performed by: PHYSICAL MEDICINE & REHABILITATION

## 2022-12-17 PROCEDURE — 92507 TX SP LANG VOICE COMM INDIV: CPT

## 2022-12-17 PROCEDURE — 6370000000 HC RX 637 (ALT 250 FOR IP): Performed by: STUDENT IN AN ORGANIZED HEALTH CARE EDUCATION/TRAINING PROGRAM

## 2022-12-17 PROCEDURE — 97116 GAIT TRAINING THERAPY: CPT

## 2022-12-17 PROCEDURE — 85610 PROTHROMBIN TIME: CPT

## 2022-12-17 PROCEDURE — 97129 THER IVNTJ 1ST 15 MIN: CPT

## 2022-12-17 PROCEDURE — 1180000000 HC REHAB R&B

## 2022-12-17 PROCEDURE — 36415 COLL VENOUS BLD VENIPUNCTURE: CPT

## 2022-12-17 PROCEDURE — 99232 SBSQ HOSP IP/OBS MODERATE 35: CPT | Performed by: PHYSICAL MEDICINE & REHABILITATION

## 2022-12-17 PROCEDURE — 97542 WHEELCHAIR MNGMENT TRAINING: CPT

## 2022-12-17 RX ORDER — DIPHENHYDRAMINE HYDROCHLORIDE, ZINC ACETATE 2; .1 G/100G; G/100G
CREAM TOPICAL 2 TIMES DAILY PRN
Status: DISCONTINUED | OUTPATIENT
Start: 2022-12-17 | End: 2022-12-30 | Stop reason: HOSPADM

## 2022-12-17 RX ORDER — WARFARIN SODIUM 7.5 MG/1
7.5 TABLET ORAL
Status: COMPLETED | OUTPATIENT
Start: 2022-12-17 | End: 2022-12-17

## 2022-12-17 RX ADMIN — DIPHENHYDRAMINE HYDROCHLORIDE, ZINC ACETATE: 2; .1 CREAM TOPICAL at 16:30

## 2022-12-17 RX ADMIN — RASAGILINE 1 MG: 1 TABLET ORAL at 08:50

## 2022-12-17 RX ADMIN — CARBIDOPA AND LEVODOPA 1 TABLET: 25; 100 TABLET, EXTENDED RELEASE ORAL at 08:51

## 2022-12-17 RX ADMIN — ESCITALOPRAM OXALATE 20 MG: 10 TABLET ORAL at 08:50

## 2022-12-17 RX ADMIN — POLYETHYLENE GLYCOL 3350 17 G: 17 POWDER, FOR SOLUTION ORAL at 08:50

## 2022-12-17 RX ADMIN — OXYCODONE HYDROCHLORIDE AND ACETAMINOPHEN 2 TABLET: 5; 325 TABLET ORAL at 20:27

## 2022-12-17 RX ADMIN — CARBIDOPA AND LEVODOPA 1 TABLET: 25; 100 TABLET ORAL at 20:26

## 2022-12-17 RX ADMIN — CARBIDOPA AND LEVODOPA 1 TABLET: 25; 100 TABLET ORAL at 08:50

## 2022-12-17 RX ADMIN — WARFARIN SODIUM 7.5 MG: 7.5 TABLET ORAL at 17:39

## 2022-12-17 RX ADMIN — CARBIDOPA AND LEVODOPA 1 TABLET: 25; 100 TABLET, EXTENDED RELEASE ORAL at 20:25

## 2022-12-17 ASSESSMENT — PAIN SCALES - GENERAL
PAINLEVEL_OUTOF10: 6
PAINLEVEL_OUTOF10: 4

## 2022-12-17 ASSESSMENT — PAIN DESCRIPTION - ORIENTATION: ORIENTATION: MID

## 2022-12-17 ASSESSMENT — PAIN - FUNCTIONAL ASSESSMENT: PAIN_FUNCTIONAL_ASSESSMENT: ACTIVITIES ARE NOT PREVENTED

## 2022-12-17 ASSESSMENT — PAIN DESCRIPTION - DESCRIPTORS: DESCRIPTORS: ACHING;TENDER

## 2022-12-17 ASSESSMENT — PAIN DESCRIPTION - LOCATION: LOCATION: BACK

## 2022-12-17 NOTE — PROGRESS NOTES
Physical Therapy  Facility/Department: [facility] ACUTE REHAB  Rehabilitation Physical Therapy Treatment Note    NAME: Justo Baca  : 1955 (53 y.o.)  MRN: [id number]  CODE STATUS: Full Code    Date of Service: 22       Restrictions:  Restrictions/Precautions: Surgical Protocols; Fall Risk;General Precautions; Up as Tolerated  Required Braces or Orthoses  Spinal: Lumbar Corset     SUBJECTIVE  Subjective  Subjective: Pt positioned in  semifowlers in bed, agreeable to tx and begins bed mobility immediately. OBJECTIVE  Cognition  Overall Cognitive Status: Exceptions  Arousal/Alertness: Delayed responses to stimuli  Following Commands: Follows one step commands with increased time; Follows one step commands with repetition  Attention Span: Difficulty attending to directions  Memory: Decreased short term memory;Decreased recall of precautions;Decreased recall of recent events  Safety Judgement: Decreased awareness of need for assistance;Decreased awareness of need for safety (Max cues for hands placement during transfers.)  Problem Solving: Assistance required to identify errors made;Assistance required to correct errors made;Assistance required to implement solutions;Assistance required to generate solutions;Decreased awareness of errors  Insights: Decreased awareness of deficits  Initiation: Requires cues for some  Sequencing: Requires cues for some  Cognition Comment: Pt's Wife Baldemar Sher states that pt's cognition and memory is near baseline  Orientation  Overall Orientation Status: Within Functional Limits    Functional Mobility  Bed Mobility  Additional Factors: Head of bed raised; With handrails  Roll Left  Assistance Level: Stand by assist  Sit to Supine  Assistance Level: Stand by assist  Supine to Sit  Assistance Level: Stand by assist  Scooting  Assistance Level: Moderate assistance  Skilled Clinical Factors: to EOB  Balance  Sitting Balance: Stand by assistance  Standing Balance:  Moderate assistance (pt with heavy posterior lean)  Transfers  Surface: From bed; Wheelchair  Additional Factors: Hand placement cues; Increased time to complete; With handrails;Verbal cues; Set-up  Device: Walker (Rolling)  Sit to Stand  Assistance Level: Moderate assistance  Skilled Clinical Factors: MAX VC and tactile cuing for step by step technique and safe hand placement, Pt lifting toes with transfers and has heavy posterior lean. Stand to Sit  Assistance Level: Minimal assistance  Skilled Clinical Factors: MAX VC and tactile cuing for step by step technique and safe hand placement,  Bed To/From Chair  Technique: Stand pivot  Assistance Level: Moderate assistance;Minimal assistance; Requires x 2 assistance  Skilled Clinical Factors: MAX VC and tactile cuing for step by step technique and safe hand placement,  Stand Pivot  Assistance Level: Minimal assistance; Moderate assistance; Requires x 2 assistance  Skilled Clinical Factors: MAX VC and tactile cuing for step by step technique and safe hand placement. Environmental Mobility  Ambulation  Surface: Level surface  Device: Rolling walker  Distance: 120ft, 70ft  Activity: Within Unit  Additional Factors: Hand placement cues; Increased time to complete;Set-up; Verbal cues  Assistance Level: Minimal assistance  Gait Deviations: Path deviations;Narrow base of support  Skilled Clinical Factors: Pt requiring continuous VC's for seceasing fwd flexed posture, improving B TKE, FWD gaze, and straight path. MIN A used to keep RW within CLARA and from veering R during ambulation/  Wheelchair  Surface: Level surface  Device: Standard wheelchair  Additional Factors: Verbal cues; Increased time to complete  Assistance Required to Manage Parts: All wheelchair parts  Assistance Level for Propulsion: Moderate assistance  Propulsion Method: Bilateral lower extremities; Bilateral upper extremities  Propulsion Quality: Decreased fluidity  Propulsion Distance: 220ft  Skilled Clinical Factors: Pt requires assistance to clear obsticles. PT Exercises  Exercise Treatment: Pt performs multiple STS and stand pivot transfers emphasis on sequencing with transfer to facilitate improved saftey and technique. Pt initially having difficulty following step by step commandss, improving with repitition. First SPT @ MOD A + MIN A, last SPT of BID tx @ SUP A. Dynamic Standing Balance Exercises: in // bars: Calf raises, B TKE with LGTB with focus on improving postural awareness. ASSESSMENT/PROGRESS TOWARDS GOALS       Assessment  Activity Tolerance: Treatment limited secondary to decreased cognition;Patient tolerated treatment well  Discharge Recommendations: Patient would benefit from continued therapy after discharge  PT Equipment Recommendations  Equipment Needed: Yes  Other: RW    Goals  Patient Goals   Patient Goals : patient \"I havn't really thought that far\". Spouse \"Strengthen Legs and improve safety\"  Short Term Goals  Time Frame for Short Term Goals: 7 days  Short Term Goal 1: pt to demo all Bed mobility with Rajni on flattened bed  Short Term Goal 2: pt to perform transfers with RW and Rajni  Short Term Goal 3: pt to ambulate 150' with RW and CGAx1  Short Term Goal 4: pt to negotiate single platform step + 2 successive steps (No rails) with Rajni x1 to allow for safe home access  Short Term Goal 5: pt to verbally identify 3/3 spinal precautions to maximize safety and functional outcomes.   Long Term Goals  Time Frame for Long Term Goals : Until D/C  Long Term Goal 1: pt to demo all Bed mobility on flattened bed with supervision  Long Term Goal 2: pt to perform transfers with RW and supervision  Long Term Goal 3: pt to ambulate 150' with RW and SBAx1  Long Term Goal 4: pt to negotiate single platform step + 2 successive steps (No rails) with SBA to allow for safe home access  Long Term Goal 5: pt to improve BLE strength by 1/2 MMG to improve safety with mobility  Additional Goals?: Yes  Long term goal 6: pt to improve Sitting and standing balance to FAIR or better to decrease risk for falls during mobility  Long term goal 7: pt to demo simulated or actual car transfer with SBA  Long term goal 8: pt to negotiate FF of steps with Bilateral Rail and SBA to allow for safe access to basement level of home. PLAN OF CARE/SAFETY  Physcial Therapy Plan  General Plan:  minutes of therapy at least 5 out of 7 days a week  Safety Devices  Type of Devices: Call light within reach; Chair alarm in place;Gait belt;Patient at risk for falls; Heels elevated for pressure relief; All fall risk precautions in place; Left in chair;Nurse notified (Nurse Francisca Hall notified)      Safety measures utilized: Gait belt, Call light within reach, Sitting in chair, Chair alarm, and Heels offloaded, Wife Belem Aceves present.          Therapy Time     12/17/22 0900 12/17/22 1529   PT Individual Minutes   Time In 0902 1432   Time Out [phone]   Minutes [address] [place] [address], 12/17/22 at 3:29 PM

## 2022-12-17 NOTE — PROGRESS NOTES
Pharmacy Note  Warfarin Consult follow-up      Recent Labs     12/16/22  0621 12/17/22  0620   INR 1.3 1.5     Recent Labs     12/16/22  0621   HGB 12.5*   HCT 35.9*          Significant Drug-Drug Interactions:  New warfarin drug-drug interactions: none  Discontinued drug-drug interactions: none  Current warfarin drug-drug interactions: sinemet, lexapro, rasagiline      Date             INR        Dose given previous day  Dose scheduled for today  12/17/2022         1.5       5 mg        7.5 mg        Notes: INR remains subtherapeutic at 1.5, will give 7.5 mg dose today to boost                      Daily PT/INR while inpatient.        Elian Quintana, PharmD, GILDA  PGY1 Pharmacy Resident  12/17/2022 3:56 PM

## 2022-12-17 NOTE — PLAN OF CARE
Individualized Plan of [address] [facility] Inpatient Rehabilitation Unit    Rehabilitation physician: Dr. Erwin Perez Date: 12/15/2022     Rehabilitation Diagnosis: Unspecified cord compression [G95.20]  Cord compression (HCC) [G95.20]     Rehabilitation impairments: self care, mobility, motor dysfunction, bowel/bladder management, pain management, safety, cognitive function, and communication    Factors facilitating achievement of predicted outcomes: Family support, Motivated, Cooperative, and Pleasant  Barriers to the achievement of predicted outcomes: Pain, Anxiety, Decreased endurance, Lower extremity weakness, Long standing deficits, Medical complications, Skin Care, and Medication managment    Patient Goals: Improve independence with mobility, Improvement of mobility at a wheelchair level, Increase overall strength and endurance, Increase balance, Increase endurance, Increase independence with activities of daily living, Improve cognition, Increase self-awareness, Increase safety awareness, Increase community integration, Increase socialization, Functional communication with caregivers, Integrate appropriate pain management plan, Assure adequate nutritional option for discharge, Continence of bowel and bladder, and Provide appropriate patient and family education      NURSING:  Nursing goals for Taylor Angel while on the rehabilitation unit will include:  Continence of bowel and bladder, Adequate number of bowel movements, Urinate with no urinary retention >300ml in bladder, Complete bladder protocol with solis removal, Maintain O2 SATs at an acceptable level during stay, Effective pain management while on the rehabilitation unit, Establish adequate pain control plan for discharge, Absence of skin breakdown while on the rehabilitation unit, Improved skin integrity via assessments including wound measurements, Avoidance of any hospital acquired infections, No signs/symptoms of infection at the wound site, Freedom from injury during hospitalization, and Complete education with patient/family with understanding demonstrated regarding disease process and resultant impairment     In order to achieve these goals, nursing interventions may include bowel/bladder training, education for medical assistive devices, medication education, O2 saturation management, energy conservation, stress management techniques, fall prevention, alarms protocol, seating and positioning, skin/wound care, pressure relief instruction, dressing changes, infection protection, DVT prophylaxis, assistance with safe transfers , and/or assistance with bathroom activities and hygiene. PHYSICAL THERAPY:  Goals:        Short Term Goals  Time Frame for Short Term Goals: 7 days  Short Term Goal 1: pt to demo all Bed mobility with Rajni on flattened bed  Short Term Goal 2: pt to perform transfers with RW and Rajni  Short Term Goal 3: pt to ambulate 150' with RW and CGAx1  Short Term Goal 4: pt to negotiate single platform step + 2 successive steps (No rails) with Rajni x1 to allow for safe home access  Short Term Goal 5: pt to verbally identify 3/3 spinal precautions to maximize safety and functional outcomes.   Long Term Goals  Time Frame for Long Term Goals : Until D/C  Long Term Goal 1: pt to demo all Bed mobility on flattened bed with supervision  Long Term Goal 2: pt to perform transfers with RW and supervision  Long Term Goal 3: pt to ambulate 150' with RW and SBAx1  Long Term Goal 4: pt to negotiate single platform step + 2 successive steps (No rails) with SBA to allow for safe home access  Long Term Goal 5: pt to improve BLE strength by 1/2 MMG to improve safety with mobility  Additional Goals?: Yes  Long term goal 6: pt to improve Sitting and standing balance to FAIR or better to decrease risk for falls during mobility  Long term goal 7: pt to demo simulated or actual car transfer with SBA  Long term goal 8: pt to negotiate FF of steps with Bilateral Rail and SBA to allow for safe access to basement level of home.    Plan of Care: Pt to be seen by physical therapy services 1 Hour per day at least 5 out of 7 days per week     Anticipated interventions may include therapeutic exercises, gait training, neuromuscular re-ed, transfer training, community reintegration, bed mobility, w/c mobility and training.      OCCUPATIONAL THERAPY:  Goals:             Short Term Goals  Time Frame for Short Term Goals: By 1 week  Short Term Goal 1: Pt will complete upper body dressing/bathing with Min A and Good attention/safety while maintaining back precautions  Short Term Goal 2: Pt will complete lower body dressing/bathing with Mod A and Good safety with use of AE as needed while maintaining back precautions  Short Term Goal 3: Pt will complete funcitonal transfers/mobility during self care tasks with Min A and Good safety with use of least restrictive device  Short Term Goal 4: Pt will tolerate standing 4+ minutes during self care tasks with Min A and Good safety  Short Term Goal 5: Pt will verbalize/demonstrate understanding of back precautions during daily activities 80% accuracy  Short Term Goal 6: Pt will participate in 30+ minutes during therapeutic exercises/functional activities to increase safety and independence with self care and mobility  Long Term Goals  Time Frame for Long Term Goals : By discharge  Long Term Goal 1: Pt will complete BADLs with SBA and Good attention/safety to task while maintaining back precautions  Long Term Goal 2: Pt will complete functional transfers/mobility during self care taks with SBA and Good safety with use of least restrictive device  Long Term Goal 3: Pt will tolerate standing 8+ minutes during self care tasks with SBA and Good safety  Long Term Goal 4: Pt will demonstrate increased FMC and strength during self care tasks as evident by 5# improvement in  strength and 15 second improvement on 9 hole peg test.  Long Term  Goal 5: Pt will demonstrate sitting EOB 20+ minutes during self care tasks while maintaining appropriate midline with SBA  Long Term Goal 6: Pt/family will verbalize/demonstrate of home safety/fall prevention strategies to increase safety and independence with self care and mobility    Plan of Care: Patient to be seen by occupational therapy services 1 Hour per day at least 5 out of 7 days per week     Anticipated interventions may include ADL and IADL retraining, strengthening, safety education and training, patient/caregiver education and training, equipment evaluation/ training/procurement, neuromuscular reeducation, wheelchair mobility training. SPEECH THERAPY:   Goals:      Dysphagia Goals: The patient will tolerate recommended diet without observed clinical signs of aspiration, The patient/caregiver will demonstrate understanding of compensatory strategies for improved swallowing safety. Short Term Goals  Goal 1: Pt will complete verbal reasoning tasks with 80% accuracy  Goal 2: Pt will complete problem solving tasks with 80% accuracy  Goal 3: Pt will complete thought organizational tasks (e.g., following complex commands, sequencing) with 80% accuracy  Goal 4: Pt will recall daily events, 3-5 units and pargraph recall with 60% accuracy  Goal 5: Increase Pt and family education re: compensatory dysarthria/voicing strategies and compensatory strategies to facilitate recall with 80% returned demonstration        Plan of Care: Pt to be seen by speech therapy services 1 Hour per day at least 5 out of 7 days per week    Anticipated interventions may include speech/language/communication therapy, cognitive training, group therapy, education, and/or dysphagia therapy based on the above goals. CASE MANAGEMENT:  Goals:   Assist patient/family with discharge planning, patient/family counseling,  and coordination with insurance during the inpatient rehabilitation stay.          Other members of the multidisciplinary rehabilitation team that will be involved in the patient's plan of care include recreational therapy, dietary, respiratory therapy, and neuropsychology. Medical issues being managed closely and that require 24 hour availability of a physician:  Swallowing precautions, Bowel/Bladder function, Weight bearing precautions, Wound care, Pain management, Infection protection, DVT prophylaxis, Fall precautions, Fluid/Electrolyte balance, Nutritional status, Respiratory needs, Anemia, and History of heart disease                                           Physician anticipated functional outcomes: Improved independence with functional measures   Estimated length of stay for this admission 10 days  Medical Prognosis: Fair  Anticipated disposition: Home. The potential to achieve the above medical and rehabilitative goals is good. This plan of care has been developed with the assistance and input of the multidisciplinary rehabilitation team.  The plan was reviewed with the patient on 12/17/2022. The patient has had the opportunity to provide input to the therapy team.    I have reviewed this Individualized Plan of Care and agree with its contents. Above documentation has been expanded, modified, adjusted to reflect the findings of my evaluations and goals for the patient. Physician:  Electronically signed by Odilon Harmon. Armaan Enciso MD on 12/17/22 at 1:36 PM EST

## 2022-12-17 NOTE — PROGRESS NOTES
[address]   Acute Rehabilitation Occupational Therapy Daily Treatment Note    Date: 22  Patient Name: Alysha Domínguez       Room: 7409/2858-12  MRN: [id number]  Account: [de-identified]   : 1955  (79 y.o.) Gender: male       Referring Practitioner: Jono Ny MD  Diagnosis: Cord compression s/p surgical decompression/fusion L1-4  Additional Pertinent Hx: Patient with spinal cord compression who had L1-4 laminectomy and fusion performed by Dr. Manley Most formarked L1-2 stenosis and degenerative spondylolisthesis at L3-4 on 22. Medical records indicate some post-op confusion. Pt admitted to ARU on 12/15/22. Treatment Diagnosis: Impaired self care status    Past Medical History:  has a past medical history of Anxiety, Asthma, Atrial fibrillation ([address] Back pain, BPH (benign prostatic hyperplasia), Dental disease, Depression, Diarrhea, MALONE (dyspnea on exertion), Frequent falls, GERD (gastroesophageal reflux disease), Memory loss, Obesity, Panic disorder, Parkinson's disease ([address] Testicular cancer ([address] and Visual impairment. Past Surgical History:   has a past surgical history that includes Cholecystectomy; hernia repair; Total knee arthroplasty (Bilateral); lumbar laminectomy; Orchiectomy; brow lift; Carpal tunnel release (Right); Shoulder arthroscopy (Right); Wrist surgery (Left); Colonoscopy; Cystoscopy; brain surgery; TURP; and lymph node dissection.     Restrictions  Restrictions/Precautions  Restrictions/Precautions: Surgical Protocols, Fall Risk, General Precautions, Up as Tolerated  Required Braces or Orthoses?: Yes  Implants present? : Metal implants, Deep brain stimulator (Spinal Fusion and B)  Required Braces or Orthoses  Spinal: Lumbar Corset  Position Activity Restriction  Spinal Precautions: Avoid Excessive Bending, Lifting, and Twisting of spine  Other position/activity restrictions: LSO to be donned when OOB; PT/OT therapy staff may titrate oxygen down as tolerated in therapy, but may only titrate up to maximum 4 L NC as needed in therapy. Nursing should perform oxygen titration > 4 L. Subjective  Subjective  Subjective: \"I might as well tell ya, I think I'm starting to get dementia\". Pt states as he has difficulty with word finding this AM.  Pain Assessment  Pain Assessment: None - Denies Pain    Objective  Cognition  Overall Orientation Status: Within Functional Limits       Cognition  Overall Cognitive Status: Exceptions  Arousal/Alertness: Delayed responses to stimuli  Following Commands: Follows one step commands with increased time; Follows one step commands with repetition  Attention Span: Difficulty attending to directions  Memory: Decreased short term memory;Decreased recall of precautions;Decreased recall of recent events  Safety Judgement: Decreased awareness of need for assistance;Decreased awareness of need for safety (Max cues for hands placement during transfers.)  Problem Solving: Assistance required to identify errors made;Assistance required to correct errors made;Assistance required to implement solutions;Assistance required to generate solutions;Decreased awareness of errors  Insights: Decreased awareness of deficits  Initiation: Requires cues for some  Sequencing: Requires cues for some  Cognition Comment: Pt's Wife Shirley Douglass states that pt's cognition and memory is near baseline; Hx of parkinson's disease Dx'd 10-15 years ago. Pt declines to following directions during tasks, e.g. for taking 1 ring at a time on t-bar stating \"well you didn't say that\". Activities of Daily Living  Grooming/Oral Hygiene  Assistance Level: Stand by assist  Skilled Clinical Factors: Mod cues and setup for sequencing to wash face and brush teeth. Pt able to use R hand for holding toothbrush with no droppage noted.     Upper Extremity Dressing  Assistance Level: Maximum assistance  Skilled Clinical Factors: Pt takes off corset brace while seated in w/c despite cues to wait for clarification. Max cues for keeping still while JETER assist for doffing/[place] shirt. Max A for orientation, unthreading/threading BUEs and pulling over head. Per Dr. Jenny Watson, Pt to complete UB bathing/dressing and donning of corest while in bed until surgical orders clairified. Lower Extremity Dressing  Assistance Level: Maximum assistance  Skilled Clinical Factors: Pt req A for pulling pants down, able to unthread BLEs with increased time. Able to thread underwear with CGA and increased time, requires A for threading BLEs through pants. CGA for balance leaning forward to foot level while sitting on toilet. Pt able to pull over hips while standing at GB with CGA for balance. Putting On/Taking Off Footwear  Assistance Level: Maximum assistance  Skilled Clinical Factors: Able to slighty achieve figure four tech to initiate donning B socks, requires A for pulling on completly. Toileting  Assistance Level: Minimal assistance  Skilled Clinical Factors: A pulling pants down while standing at GB, Pt able to bend to pull back up over hips with CGA for safety. Mobility  Sit to Stand  Assistance Level: Contact guard assist  Skilled Clinical Factors: Max tactile cues for hand placement  Stand to Sit  Assistance Level: Contact guard assist  Skilled Clinical Factors: Max tactile cues for hand placement     Stand Pivot  Assistance Level: Contact guard assist  Skilled Clinical Factors: Max cues for hand placement and sequencing. OT Exercises  Dynamic Sitting Balance Exercises: PM: Pt instruction in dynamic sitting tasks for increased core stability for safety with ADLs. Pt instruction in reaching L<>R to grasp and release cones while seated in front of mirror. Pt requries mod cues for maintaining midline throughout task and drops 3/8 and 2/8 cones.     Dynamic Standing Balance Exercises: PM: Pt tolerates standing for 1:30 to complete tabletop task faciiltating increased tolerance and balance for safety with ADLs. Mod posterior lean noted requiring CGA/min A for safety. Assessment  Assessment  Activity Tolerance: Patient tolerated treatment well;Treatment limited secondary to decreased cognition  Discharge Recommendations: 24 hour supervision or assist;Home with Home health OT    Patient Education  Education  Education Given To: Patient; Family  Education Provided: Role of Therapy;Plan of Care;Precautions; ADL Function;Transfer Training;Cognition  Education Method: Verbal;Demonstration  Barriers to Learning: Cognition  Education Outcome: Continued education needed    Goals  Patient Goals   Patient goals : \"I would like to be able to get up and go so I can go outside and do some repairs and go downstairs and reload some guerrero. Do the things I normally do. \"  Short Term Goals  Time Frame for Short Term Goals: By 1 week  Short Term Goal 1: Pt will complete upper body dressing/bathing with Min A and Good attention/safety while maintaining back precautions  Short Term Goal 2: Pt will complete lower body dressing/bathing with Mod A and Good safety with use of AE as needed while maintaining back precautions  Short Term Goal 3: Pt will complete funcitonal transfers/mobility during self care tasks with Min A and Good safety with use of least restrictive device  Short Term Goal 4: Pt will tolerate standing 4+ minutes during self care tasks with Min A and Good safety  Short Term Goal 5: Pt will verbalize/demonstrate understanding of back precautions during daily activities 80% accuracy  Short Term Goal 6: Pt will participate in 30+ minutes during therapeutic exercises/functional activities to increase safety and independence with self care and mobility    Long Term Goals  Time Frame for Long Term Goals : By discharge  Long Term Goal 1: Pt will complete BADLs with SBA and Good attention/safety to task while maintaining back precautions  Long Term Goal 2: Pt will complete functional transfers/mobility during self care taks with SBA and Good safety with use of least restrictive device  Long Term Goal 3: Pt will tolerate standing 8+ minutes during self care tasks with SBA and Good safety  Long Term Goal 4: Pt will demonstrate increased [facility] and strength during self care tasks as evident by 5# improvement in  strength and 15 second improvement on 9 hole peg test.  Long Term Goal 5: Pt will demonstrate sitting EOB 20+ minutes during self care tasks while maintaining appropriate midline with SBA  Long Term Goal 6: Pt/family will verbalize/demonstrate of home safety/fall prevention strategies to increase safety and independence with self care and mobility    Plan  Occupational Therapy Plan  Times Per Week: 5-7  Times Per Day: Twice a day  Current Treatment Recommendations: Self-Care / ADL, Strengthening, ROM, Balance training, Functional mobility training, Endurance training, Cognitive reorientation, Pain management, Safety education & training, Patient/Caregiver education & training, Equipment evaluation, education, & procurement, Home management training, Coordination training, Cognitive/Perceptual training       12/17/22 1006 12/17/22 1437   OT Individual Minutes   Time In [id number]   [id number]   [id number]       Electronically signed by KIM Yepez on 12/17/22 at 2:38 PM EST

## 2022-12-17 NOTE — PROGRESS NOTES
Physical Medicine & Rehabilitation  Progress Note    12/17/2022 1:29 PM     CC: Ambulatory and ADL dysfunction due to spinal cord compression with paraparesis with Parkinson's    Subjective:   No Complaints. Notes some itching lower back. ROS:  Denies fevers, chills, sweats. No chest pain, palpitations, lightheadedness. Denies coughing, wheezing or shortness of breath. Denies abdominal pain, nausea, diarrhea or constipation. No new areas of joint pain. Denies new areas of numbness or weakness. Denies new anxiety or depression issues. No new skin problems. Rehabilitation:   PT:  Restrictions/Precautions: Surgical Protocols, Fall Risk, General Precautions, Up as Tolerated  Implants present? : Metal implants, Deep brain stimulator (Spinal Fusion and B)  Spinal Precautions: Avoid Excessive Bending, Lifting, and Twisting of spine  Other position/activity restrictions: LSO to be donned when OOB; PT/OT therapy staff may titrate oxygen down as tolerated in therapy, but may only titrate up to maximum 4 L NC as needed in therapy. Nursing should perform oxygen titration > 4 L. Required Braces or Orthoses  Spinal: Lumbar Corset   Transfers  Sit to Stand: Moderate Assistance  Stand to Sit: Moderate Assistance  Stand Pivot Transfers: Minimal Assistance  Comment: RW for multiple transfers (including toilet transfer); pt demos excessive posterior leaning upon rising and returning to sit. maximal VVT cues for safety, hands placement, and anterior weightshifting with limited carry over. Ambulation  Surface: Level tile  Device: Rolling Walker  Assistance: Moderate assistance (Mod A mainly for backward stepping)  Quality of Gait: Fwd amb: forward trunk, NBOS, mildly unsteady; Upon attempting to step backwards pt impulsivley and quickly starts back-stepping with heavy posterior lean and backs into wheelchair.   Gait Deviations: Decreased step height, Decreased step length, Decreased head and trunk rotation, Slow Katey, Shuffles  Distance: 3 feet x 3  Comments: worked on transfer and ambulation at bedside for technique safety from varied surfaces and surfaces height. spouse present and discussed mobility for couch height. More Ambulation?: Yes      OT:  ADL  Feeding: Stand by assistance  Feeding Skilled Clinical Factors: per wife report  Grooming: Minimal assistance  Grooming Skilled Clinical Factors: Min A with LUE to hold toothbrush due to pt dropping into sink mulitple times  UE Bathing: Maximum assistance  UE Bathing Skilled Clinical Factors: OT provided max verbal cues on this date to complete upper body bathing task with pt only able to minimally participate perserverating on washing the sink. [place] for minimal participation in task. Therapist A to complete. LE Bathing: Maximum assistance  LE Bathing Skilled Clinical Factors: Pt able to wash bilateral thighs while sitting in wheelchair. Pt required A with bilateral lower legs . Mod A for standing balance with therapist A to complete bolivar/bottom hygiene  UE Dressing: Maximum assistance  UE Dressing Skilled Clinical Factors: A for orientation of shirt with increased time. Pt able to find one sleeve with increased time and verbal cues. Pt required therapist A with threading LUE, putting short over head, and pulling down over chest and back. A with doffing/donning lumbar corset. LE Dressing: Dependent/Total  LE Dressing Skilled Clinical Factors: Therapist complete threading BLE and pulling up over hips. Mod A while standing for balance. Toileting: Dependent/Total  Toileting Skilled Clinical Factors: 2 person A while standing with A with clothing management and hygiene  Additional Comments: OT facilitated pts engagement in self care tasks on this date. Pt required max verbal/tactile cues with fair carryover. Pt currenlty limited due to decreased strength, FMC, balance, activity tolerance, and cognitive barriers impacting safety and independence with self care tasks. Balance  Sitting Balance: Moderate assistance (Pt sitting EOB with Mod A to sit upright due to posterior left side lean; pt demonstrated left side lean in wheelchair with pillow placed to assist with support)  Standing Balance: Dependent/Total (Mod A x 2 with posterior lean; Able to progress to Mod A x 1)      Functional Mobility  Functional - Mobility Device: Rolling Walker  Activity: To/from bathroom  Assist Level: Moderate assistance (Mod A x 1 and Min A x 1 with posterior lean with increased time; Pt able to progress to Mod A x 1 with small mobility from w/c to recliner chair)  Functional Mobility Comments: Verbal cues for hand placement and safety with Fair carryover. Mod A x 1 and Min A x 1 for safety with posterior lean. Pt able to progress to Mod A x 1 with RW from w/c to bed with max verbal cues for safety. Pt continues to demonstrate Posterior lean. Bed mobility  Supine to Sit: Moderate assistance (A with trunk)  Bed Mobility Comments: Bed mobility is not assessed this AM as pt is already up seated in wheelchair and retires to recliner at end of session  Transfers  Sit to stand: 2 Person assistance, Moderate assistance (Initially Mod A x 2 with pt able to progress to mod A x 1)  Stand to sit: 2 Person assistance, Moderate assistance (Initially Mod A x 2 with pt able to progress to mod A x 1)  Transfer Comments: Back braced donning while EOB with Mod A for sitting balance prior to transfers. Verbal cues for hand placement and safety with Fair carryover. Mod A x 2   Toilet Transfers  Toilet - Technique: Ambulating, Stand step  Equipment Used: Standard toilet  Toilet Transfer: 2 Person assistance, Moderate assistance  Toilet Transfers Comments: Pt completed mobility into bathroom for toileting task with 2 person A (Mod A x 1 and Min A x 1) with posterior lean. Mod A x 2 to safety sit onto toilet. Mod A x 2 to stand with verbal cue for hand placement and safety with posterior lean.  Mod A x 2 for stand step to wheelchair from toilet               ST:    Recommended Diet and Intervention  Recommended Diet: Regular  Recommended Liquid: Thin   Recommended Form of Meds: PO  Therapeutic Interventions: Diet tolerance monitoring;Oral motor exercises; Patient/Family education    Cognitive Diagnosis: Pt presents with a mild-moderate cognitive deficit characterized by impaired following complex commands, verbal reasoning, problem solving and sequencing. Short-term and working memory judged to be moderate-severely impaired. Pt and wife report gradual decline in cognition over the years. Per wife, specifically Pt has difficulty with memory. Pt's wife completes all time, medication and financial management at home. Speech Diagnosis: Pt presents with moderate dysarthria characterized by imprecise articulation, blended word boundaries, decreased breath support for speech, reduced vocal intensity (declining with duration) and reduced rate. O/M deficits present include reduced lingual and labial strength and ROM. Diagnosis: ST recommended to target cognition and motor speech disorder, with focus on compensatory strategy training d/t progressive nature of Parkinson's Disease. Objective:  BP (!) 144/91   Pulse 61   Temp 97.3 °F (36.3 °C)   Resp 16   Ht 6' (1.829 m)   Wt 231 lb 4.8 oz (104.9 kg)   SpO2 95%   BMI 31.37 kg/m²  I Body mass index is 31.37 kg/m². I   Wt Readings from Last 1 Encounters:   12/15/22 231 lb 4.8 oz (104.9 kg)      Temp (24hrs), Av.1 °F (36.7 °C), Min:97.3 °F (36.3 °C), Max:98.8 °F (37.1 °C)         GEN: well developed, well nourished, no acute distress  HEENT: Normocephalic atraumatic, EOMI, mucous membranes pink and moist  CV: RRR, no murmurs, rubs or gallops  PULM: CTAB, no rales or rhonchi. Respirations WNL and unlabored  ABD: soft, NT, ND, +BS and equal  NEURO: A&O x3. Sensation intact to light touch.   Bradykinesia, resting tremors  MSK: 3/5 lower extremity 4+/5 upper extremities  EXTREMITIES: No calf tenderness to palpation bilaterally. No edema BLEs  SKIN: warm dry and intact with good turgor incision clean and intact  PSYCH: appropriately interactive. Affect WNL. Good spirits        Medications   Scheduled Meds:   mirabegron  50 mg Oral Nightly    carbidopa-levodopa  1 tablet Oral BID    carbidopa-levodopa  1 tablet Oral BID    escitalopram  20 mg Oral Daily    rasagiline mesylate  1 mg Oral Daily    polyethylene glycol  17 g Oral Daily    warfarin placeholder: dosing by pharmacy   Other RX Placeholder     Continuous Infusions:  PRN Meds:.oxyCODONE-acetaminophen, tiZANidine, acetaminophen, senna, bisacodyl     Diagnostics:     CBC:   Recent Labs     12/16/22  0621   WBC 7.0   RBC 3.72*   HGB 12.5*   HCT 35.9*   MCV 96.7   RDW 13.8        BMP:   Recent Labs     12/16/22  0621      K 3.9      CO2 25   BUN 23   CREATININE 0.80     BNP: No results for input(s): BNP in the last 72 hours. PT/INR:   Recent Labs     12/16/22 0621 12/17/22  0620   PROTIME 15.7* 17.8*   INR 1.3 1.5     APTT: No results for input(s): APTT in the last 72 hours. CARDIAC ENZYMES: No results for input(s): CKMB, CKMBINDEX, TROPONINT in the last 72 hours. Invalid input(s): CKTOTAL;3  FASTING LIPID PANEL:No results found for: CHOL, HDL, TRIG  LIVER PROFILE:   Recent Labs     12/16/22  0621   AST 35   ALT 44*   BILIDIR 0.2   BILITOT 0.7   ALKPHOS 96        I/O (24Hr): No intake or output data in the 24 hours ending 12/17/22 1329    Glu last 24 hour  No results for input(s): POCGLU in the last 72 hours. No results for input(s): CLARITYU, COLORU, PHUR, SPECGRAV, PROTEINU, RBCUA, BLOODU, BACTERIA, NITRU, WBCUA, LEUKOCYTESUR, YEAST, GLUCOSEU, BILIRUBINUR in the last 72 hours. Impression/Plan:    Lumbar spinal cord compression with B paraparesis:  PT/OT for gait, mobility, strengthening, endurance, ADLs, and self care.  S/p lumbar decompression L1-L4 laminectomy and fusion by  Bouchra 12/7. OK to shower. Or laminectomy-incision clean, monitor  Pain-has Percocet prn pain and tizanidine prn spasm. Parkinson's Disease: treats with Dr. David Phoenix. On carbidopa-levodopa CR and IR, on rasagiline. Takes [place] at home - wife may bring in home supply. Depression: on escitalopram  OAB: on mirabegron. Has outpatient urology follow up. Atrial fibrillation: on warfarin. Titrating to therapeutic INR 2-3. Pharmacy dosing. INR subtherapeutic today at 1.5 today  Bowel Management: Miralax daily, senokot prn, dulcolax prn. DVT Prophylaxis:  Coumadin with INR goal 2-3, SCD's while in bed, and OUSMANE's during the day  Internal medicine for medical management      João Kilgore. Kimberly Ayala MD       This note is created with the assistance of a speech recognition program.  While intending to generate a document that actually reflects the content of the visit, the document can still have some errors including those of syntax and sound a like substitutions which may escape proof reading.   In such instances, actual meaning can be extrapolated by contextual diversion

## 2022-12-17 NOTE — PLAN OF CARE
Problem: Discharge Planning  Goal: Discharge to home or other facility with appropriate resources  Outcome: Progressing     Problem: Skin/Tissue Integrity  Goal: Absence of new skin breakdown  Description: 1. Monitor for areas of redness and/or skin breakdown  2. Assess vascular access sites hourly  3. Every 4-6 hours minimum:  Change oxygen saturation probe site  4. Every 4-6 hours:  If on nasal continuous positive airway pressure, respiratory therapy assess nares and determine need for appliance change or resting period. Outcome: Progressing     Problem: Confusion  Goal: Confusion, delirium, dementia, or psychosis is improved or at baseline  Description: INTERVENTIONS:  1. Assess for possible contributors to thought disturbance, including medications, impaired vision or hearing, underlying metabolic abnormalities, dehydration, psychiatric diagnoses, and notify attending LIP  2. [place] high risk fall precautions, as indicated  3. Provide frequent short contacts to provide reality reorientation, refocusing and direction  4. Decrease environmental stimuli, including noise as appropriate  5. Monitor and intervene to maintain adequate nutrition, hydration, elimination, sleep and activity  6. If unable to ensure safety without constant attention obtain sitter and review sitter guidelines with assigned personnel  7.  Initiate Psychosocial CNS and Spiritual Care consult, as indicated  Outcome: Progressing

## 2022-12-17 NOTE — PROGRESS NOTES
Speech Language Pathology  Speech Language Pathology  [facility]    Speech Language Treatment Note    Date: 12/17/2022  Patients Name: Kelli Her  MRN: [id number]  Diagnosis:   Patient Active Problem List   Diagnosis Code    Atrial fibrillation [facility]) I48.91    Chronic back pain M54.9, G89.29    Erectile dysfunction N52.9    Gait disorder R26.9    Hypertension I10    Lower urinary tract symptoms R39.9    Malignant neoplasm of testis (HCC) C62.90    Osteoarthrosis M19.90    Parkinson disease ([address] G20    Peripheral nerve disease G62.9    Cord compression ([facility] [address] G95.20    Moderate malnutrition ([facility] [address] E44.0       Pain: no    Speech and Language Treatment  Treatment time: [id number]    Subjective: [x] Alert [x] Cooperative     [] Confused     [] Agitated    [] Lethargic      Objective/Assessment:    Speech: Pt up and sitting in wheel chair upon arrival with wife present. Wife reports that pt has participated in speech therapy in the past targeting his dysarthria. Significant pt and family ed provided re use of compensatory dysarthria strategies including environmental modifications, breath support, overexaggeration of articulatory movement, and activities to complete. Pt and wife verbalized understanding. Voice: Pt continues with decreased vocal intensity for speech. Reviewed diaphragmatic breathing exercises with pt and patient's wife. Max phonation time (sustained vowel) measured at 10 seconds. Cognition: Orientation log administered this AM. Pt scored a 18/30. He demonstrated most difficulty with the following subcategories: month, date, day of week. Plan:  [] Continue ST services    [] Discharge from ST:      Discharge recommendations: []  Further therapy recommended at discharge. The patient should be able to tolerate at least 3 hours of therapy per day over 5 days or 15 hours over 7 days. [] Further therapy recommended at discharge.    [] No therapy recommended at discharge. Treatment completed by:  Trey Mead, SLP, M.A., [id number] [facility]

## 2022-12-18 LAB
INR BLD: 2
PROTHROMBIN TIME: 22.4 SEC (ref 11.8–14.6)

## 2022-12-18 PROCEDURE — 6370000000 HC RX 637 (ALT 250 FOR IP): Performed by: STUDENT IN AN ORGANIZED HEALTH CARE EDUCATION/TRAINING PROGRAM

## 2022-12-18 PROCEDURE — 1180000000 HC REHAB R&B

## 2022-12-18 PROCEDURE — 6370000000 HC RX 637 (ALT 250 FOR IP): Performed by: PHYSICAL MEDICINE & REHABILITATION

## 2022-12-18 PROCEDURE — 97129 THER IVNTJ 1ST 15 MIN: CPT

## 2022-12-18 PROCEDURE — 97110 THERAPEUTIC EXERCISES: CPT

## 2022-12-18 PROCEDURE — 97535 SELF CARE MNGMENT TRAINING: CPT

## 2022-12-18 PROCEDURE — 99232 SBSQ HOSP IP/OBS MODERATE 35: CPT | Performed by: PHYSICAL MEDICINE & REHABILITATION

## 2022-12-18 PROCEDURE — 99232 SBSQ HOSP IP/OBS MODERATE 35: CPT | Performed by: INTERNAL MEDICINE

## 2022-12-18 PROCEDURE — 97530 THERAPEUTIC ACTIVITIES: CPT

## 2022-12-18 PROCEDURE — 92507 TX SP LANG VOICE COMM INDIV: CPT

## 2022-12-18 PROCEDURE — 85610 PROTHROMBIN TIME: CPT

## 2022-12-18 PROCEDURE — 36415 COLL VENOUS BLD VENIPUNCTURE: CPT

## 2022-12-18 PROCEDURE — 97116 GAIT TRAINING THERAPY: CPT

## 2022-12-18 RX ORDER — OXYCODONE HYDROCHLORIDE AND ACETAMINOPHEN 5; 325 MG/1; MG/1
2 TABLET ORAL EVERY 6 HOURS PRN
Status: DISCONTINUED | OUTPATIENT
Start: 2022-12-18 | End: 2022-12-30 | Stop reason: HOSPADM

## 2022-12-18 RX ORDER — WARFARIN SODIUM 5 MG/1
5 TABLET ORAL
Status: COMPLETED | OUTPATIENT
Start: 2022-12-18 | End: 2022-12-18

## 2022-12-18 RX ADMIN — RASAGILINE 1 MG: 1 TABLET ORAL at 08:39

## 2022-12-18 RX ADMIN — CARBIDOPA AND LEVODOPA 1 TABLET: 25; 100 TABLET, EXTENDED RELEASE ORAL at 19:47

## 2022-12-18 RX ADMIN — ESCITALOPRAM OXALATE 20 MG: 10 TABLET ORAL at 08:38

## 2022-12-18 RX ADMIN — POLYETHYLENE GLYCOL 3350 17 G: 17 POWDER, FOR SOLUTION ORAL at 08:38

## 2022-12-18 RX ADMIN — CARBIDOPA AND LEVODOPA 1 TABLET: 25; 100 TABLET ORAL at 08:39

## 2022-12-18 RX ADMIN — OXYCODONE HYDROCHLORIDE AND ACETAMINOPHEN 2 TABLET: 5; 325 TABLET ORAL at 08:38

## 2022-12-18 RX ADMIN — WARFARIN SODIUM 5 MG: 5 TABLET ORAL at 19:46

## 2022-12-18 RX ADMIN — CARBIDOPA AND LEVODOPA 1 TABLET: 25; 100 TABLET ORAL at 19:47

## 2022-12-18 RX ADMIN — CARBIDOPA AND LEVODOPA 1 TABLET: 25; 100 TABLET, EXTENDED RELEASE ORAL at 08:39

## 2022-12-18 ASSESSMENT — PAIN DESCRIPTION - LOCATION: LOCATION: BACK

## 2022-12-18 ASSESSMENT — PAIN SCALES - GENERAL
PAINLEVEL_OUTOF10: 7
PAINLEVEL_OUTOF10: 0
PAINLEVEL_OUTOF10: 4

## 2022-12-18 ASSESSMENT — ENCOUNTER SYMPTOMS
GASTROINTESTINAL NEGATIVE: 1
RESPIRATORY NEGATIVE: 1
ALLERGIC/IMMUNOLOGIC NEGATIVE: 1
EYES NEGATIVE: 1

## 2022-12-18 NOTE — PROGRESS NOTES
Speech Language Pathology  Speech Language Pathology  [facility]    Speech Language/cognitive Treatment Note    Date: 12/18/2022  Patients Name: Denae Sanchez  MRN: [id number]  Diagnosis:   Patient Active Problem List   Diagnosis Code    Atrial fibrillation [facility]) I48.91    Chronic back pain M54.9, G89.29    Erectile dysfunction N52.9    Gait disorder R26.9    Hypertension I10    Lower urinary tract symptoms R39.9    Malignant neoplasm of testis (HCC) C62.90    Osteoarthrosis M19.90    Parkinson disease ([address] G20    Peripheral nerve disease G62.9    Cord compression ([address] G95.20    Moderate malnutrition ([address] E44.0       Pain: no    Speech and Language Treatment  Treatment time: [id number]    Subjective: [] Alert [x] Cooperative     [] Confused     [] Agitated    [x] Lethargic      Objective/Assessment:    Speech: Pt up and sitting in wheel chair upon arrival with wife present. Pt lethargic throughout session, participating with eyes closed. Voice: Pt continues with decreased vocal intensity for speech. Pt completed voice strengthening program with minimal assist. Exercise handout left in room. Very minimal pitch variation during pitch glide exercises. Pt encouraged to use diaphragmatic breathing throughout. Cognition: Cognitive log administered this AM. Pt scored a 9/30. Pt required extra time to respond throughout administration. Plan:  [] Continue ST services    [] Discharge from ST:      Discharge recommendations: []  Further therapy recommended at discharge. The patient should be able to tolerate at least 3 hours of therapy per day over 5 days or 15 hours over 7 days. [] Further therapy recommended at discharge. [] No therapy recommended at discharge. Treatment completed by:  Danelle Ward, SLP, M.A., [place]

## 2022-12-18 NOTE — PLAN OF CARE
Problem: Discharge Planning  Goal: Discharge to home or other facility with appropriate resources  12/18/2022 0433 by Lori Kwok RN  Outcome: Progressing     Problem: Skin/Tissue Integrity  Goal: Absence of new skin breakdown  Description: 1. Monitor for areas of redness and/or skin breakdown  2. Assess vascular access sites hourly  3. Every 4-6 hours minimum:  Change oxygen saturation probe site  4. Every 4-6 hours:  If on nasal continuous positive airway pressure, respiratory therapy assess nares and determine need for appliance change or resting period. 12/18/2022 0433 by Lori Kwok RN  Outcome: Progressing     Problem: Safety - Adult  Goal: Free from fall injury  Outcome: Progressing     Problem: Pain  Goal: Verbalizes/displays adequate comfort level or baseline comfort level  Outcome: Progressing     Problem: Confusion  Goal: Confusion, delirium, dementia, or psychosis is improved or at baseline  Description: INTERVENTIONS:  1. Assess for possible contributors to thought disturbance, including medications, impaired vision or hearing, underlying metabolic abnormalities, dehydration, psychiatric diagnoses, and notify attending LIP  2. [place] high risk fall precautions, as indicated  3. Provide frequent short contacts to provide reality reorientation, refocusing and direction  4. Decrease environmental stimuli, including noise as appropriate  5. Monitor and intervene to maintain adequate nutrition, hydration, elimination, sleep and activity  6. If unable to ensure safety without constant attention obtain sitter and review sitter guidelines with assigned personnel  7.  Initiate Psychosocial CNS and Spiritual Care consult, as indicated  12/18/2022 0433 by Lori Kwok RN  Outcome: Progressing     Problem: ABCDS Injury Assessment  Goal: Absence of physical injury  Outcome: Progressing     Problem: Nutrition Deficit:  Goal: Optimize nutritional status  Outcome: Progressing

## 2022-12-18 NOTE — PROGRESS NOTES
Pharmacy Note  Warfarin Consult follow-up      Recent Labs     12/16/22  0621 12/17/22  0620 12/18/22  0634   INR 1.3 1.5 2.0     Recent Labs     12/16/22  0621   HGB 12.5*   HCT 35.9*          Significant Drug-Drug Interactions:  New warfarin drug-drug interactions: none  Discontinued drug-drug interactions: none  Current warfarin drug-drug interactions: sinemet, lexapro, rasagiline      Date             INR        Dose given previous day  Dose scheduled for today  12/18/2022            2.0       7.5 mg           5 mg        Notes:                     Daily PT/INR while inpatient.      Gulshan Kraft RPH,PharmD,  12/18/2022, 11:07 AM

## 2022-12-18 NOTE — PROGRESS NOTES
[address]   Acute Rehabilitation Occupational Therapy Daily Treatment Note    Date: 22  Patient Name: Lorna Pérez       Room: [facility]5/1884-  MRN: [id number]  Account: [de-identified]   : 1955  (78 y.o.) Gender: male       Referring Practitioner: Mariah Celeste MD  Diagnosis: Cord compression s/p surgical decompression/fusion L1-4  Additional Pertinent Hx: Patient with spinal cord compression who had L1-4 laminectomy and fusion performed by Dr. Mirta Rene formarked L1-2 stenosis and degenerative spondylolisthesis at L3-4 on 22. Medical records indicate some post-op confusion. Pt admitted to ARU on 12/15/22. Treatment Diagnosis: Impaired self care status    Past Medical History:  has a past medical history of Anxiety, Asthma, Atrial fibrillation ([address] Back pain, BPH (benign prostatic hyperplasia), Dental disease, Depression, Diarrhea, MALONE (dyspnea on exertion), Frequent falls, GERD (gastroesophageal reflux disease), Memory loss, Obesity, Panic disorder, Parkinson's disease ([address] Testicular cancer ([address] and Visual impairment. Past Surgical History:   has a past surgical history that includes Cholecystectomy; hernia repair; Total knee arthroplasty (Bilateral); lumbar laminectomy; Orchiectomy; brow lift; Carpal tunnel release (Right); Shoulder arthroscopy (Right); Wrist surgery (Left); Colonoscopy; Cystoscopy; brain surgery; TURP; and lymph node dissection.     Restrictions  Restrictions/Precautions  Restrictions/Precautions: Surgical Protocols, Fall Risk, General Precautions, Up as Tolerated  Required Braces or Orthoses?: Yes  Implants present? : Metal implants, Deep brain stimulator (Spinal Fusion and B TKAs)  Required Braces or Orthoses  Spinal: Lumbar Corset  Position Activity Restriction  Spinal Precautions: Avoid Excessive Bending, Lifting, and Twisting of spine  Other position/activity restrictions: LSO to be donned when OOB; PT/OT therapy staff may titrate oxygen down as tolerated in therapy, but may only titrate up to maximum 4 L NC as needed in therapy. Nursing should perform oxygen titration > 4 L. Subjective  Subjective  Subjective: \"Not too bad\" Pt states when asked how he's doing today. Pain Assessment  Pain Level: 4  Pain Location: Back    Objective  Cognition  Overall Orientation Status: Within Functional Limits       Cognition  Overall Cognitive Status: Exceptions  Arousal/Alertness: Delayed responses to stimuli  Following Commands: Follows one step commands with increased time; Follows one step commands with repetition  Attention Span: Difficulty attending to directions  Memory: Decreased short term memory;Decreased recall of precautions;Decreased recall of recent events  Safety Judgement: Decreased awareness of need for assistance;Decreased awareness of need for safety  Problem Solving: Assistance required to identify errors made;Assistance required to correct errors made;Assistance required to implement solutions;Assistance required to generate solutions;Decreased awareness of errors  Insights: Decreased awareness of deficits  Initiation: Requires cues for some  Sequencing: Requires cues for some  Cognition Comment: Pt's Wife Liliam Severe states that pt's cognition and memory is near baseline; Hx of parkinson's disease Dx'd 10-15 years ago    Activities of Daily Living  Grooming/Oral Hygiene  Assistance Level: Stand by assist  Skilled Clinical Factors: Mod cues and setup for sequencing to wash face and brush teeth. Pt able to use R hand for holding toothbrush with no droppage noted. Upper Extremity Bathing  Assistance Level: Set-up  Skilled Clinical Factors: Completes while semi supine in bed, VC for initiation. Lower Extremity Bathing  Assistance Level: Contact guard assist  Skilled Clinical Factors: Pt washes buttock/bolivar area while standing at sink with CGA. Setup to apply soap to washcloth, VC for initation of each step.     Upper Extremity Dressing  Assistance Level: Maximum assistance  Skilled Clinical Factors: Completed while semi supine in bed, Max A for shirt and lumbar corset. Lower Extremity Dressing  Assistance Level: maximum assistance  Skilled Clinical Factors: A with threading pants to avoid excess bending, Pt able to manage up/down over hips while standing at the sink with CGA and VC for straightening B knees. Mobility  Sit to Supine  Assistance Level: Moderate assistance  Skilled Clinical Factors: A for BLEs and trunk  Supine to Sit  Assistance Level: Minimal assistance  Skilled Clinical Factors: A for trunk    Sit to Stand  Assistance Level: Contact guard assist  Skilled Clinical Factors: Max tactile cues for hand placement  Stand to Sit  Assistance Level: Minimal assistance  Skilled Clinical Factors: Max tactile cues for hand placement. Intermittent Min A for posterior lean. Functional Mobility  Device: Rolling walker  Activity: To/From bathroom  Assistance Level: Minimal assistance  Skilled Clinical Factors: VC for upright posture and wide CLARA during functional mobility. Intermittent Min A for posterior lean. OT Exercises  Resistive Exercises: AM: Pt instruction in BUE exercises while sitting unsupported in recliner for facilitation of increased strength and core stability for safety with ADLs. Pt completes with 2# free weights, in all available planes, within spinal precautions X15 reps each. Pt req VC to count out loud to A with termination of tasks. Requires RB in between each exercise due to fatigue. Motor Control/Coordination: PM: Pt instruction in [facility] task to place golf tees in slots and graded resistive clothes pins on top mercedes for increased ease with ADLs. Pt completes with increased time and dropping 5-6 throughout for golf tees. Assessment  Assessment  Activity Tolerance: Patient tolerated treatment well    Patient Education  Education  Education Given To: Patient; Family  Education Provided: Role of Therapy;Plan of Care;Precautions; ADL Function;Transfer Training;Cognition  Education Method: Verbal;Demonstration  Barriers to Learning: Cognition  Education Outcome: Continued education needed    Goals  Patient Goals   Patient goals : \"I would like to be able to get up and go so I can go outside and do some repairs and go downstairs and reload some guerrero. Do the things I normally do. \"  Short Term Goals  Time Frame for Short Term Goals: By 1 week  Short Term Goal 1: Pt will complete upper body dressing/bathing with Min A and Good attention/safety while maintaining back precautions  Short Term Goal 2: Pt will complete lower body dressing/bathing with Mod A and Good safety with use of AE as needed while maintaining back precautions  Short Term Goal 3: Pt will complete funcitonal transfers/mobility during self care tasks with Min A and Good safety with use of least restrictive device  Short Term Goal 4: Pt will tolerate standing 4+ minutes during self care tasks with Min A and Good safety  Short Term Goal 5: Pt will verbalize/demonstrate understanding of back precautions during daily activities 80% accuracy  Short Term Goal 6: Pt will participate in 30+ minutes during therapeutic exercises/functional activities to increase safety and independence with self care and mobility    Long Term Goals  Time Frame for Long Term Goals : By discharge  Long Term Goal 1: Pt will complete BADLs with SBA and Good attention/safety to task while maintaining back precautions  Long Term Goal 2: Pt will complete functional transfers/mobility during self care taks with SBA and Good safety with use of least restrictive device  Long Term Goal 3: Pt will tolerate standing 8+ minutes during self care tasks with SBA and Good safety  Long Term Goal 4: Pt will demonstrate increased [address] and strength during self care tasks as evident by 5# improvement in  strength and 15 second improvement on 9 hole peg test.  Long Term Goal 5: Pt will demonstrate sitting EOB 20+ minutes during self care tasks while maintaining appropriate midline with SBA  Long Term Goal 6: Pt/family will verbalize/demonstrate of home safety/fall prevention strategies to increase safety and independence with self care and mobility    Plan  Occupational Therapy Plan  Times Per Week: 5-7  Times Per Day: Twice a day  Current Treatment Recommendations: Self-Care / ADL, Strengthening, ROM, Balance training, Functional mobility training, Endurance training, Cognitive reorientation, Pain management, Safety education & training, Patient/Caregiver education & training, Equipment evaluation, education, & procurement, Home management training, Coordination training, Cognitive/Perceptual training         12/18/22 1003 12/18/22 1548   OT Individual Minutes   Time In [id number]   XPOZ WZP 5999 2254   [address] 60 94         Electronically signed by KIM Rizo on 12/18/22 at 3:49 PM EST

## 2022-12-18 NOTE — CONSULTS
[facility] Internal Medicine  Jen Galvan MD; Nikki Menendez MD; Abbe Damon MD; MD Ira Mondragon MD; MD LOLY López [facility] Internal Medicine   [facility]    HISTORY AND PHYSICAL EXAMINATION            Date:   12/18/2022  Patient name:  Karishma Elizabeth  Date of admission:  12/15/2022  5:57 PM  MRN:   [id number]  Account:  [de-identified]  YOB: 1955  PCP:    Kendra Penaloza MD  Room:   7934/3274-48  Code Status:    Full Code    Chief Complaint:       Back pain from spinal stenosis s/p laminectomy    History Obtained From:     Patient and electronic medical record    History of Present Illness:     Karishma Elizabeth is a 79 y.o. Unavailable / unknown male who has past medical history of a . Fib and parkinson presents with back pain from spinal cord compression s/p laminectomy. He is admitted for acute rehabilitation. Laminectomy done at [facility] 1 week back. Currently patient denied back pain. He dose have weakness in bilateral lower extremity 4/5.      Past Medical History:     Past Medical History:   Diagnosis Date    Anxiety     Asthma     Atrial fibrillation (HCC)     Back pain     BPH (benign prostatic hyperplasia)     Dental disease     Depression     Diarrhea     MALONE (dyspnea on exertion)     Frequent falls     GERD (gastroesophageal reflux disease)     Memory loss     Obesity     Panic disorder     Parkinson's disease ([address]     Testicular cancer ([address]     Visual impairment         Past Surgical History:     Past Surgical History:   Procedure Laterality Date    BRAIN SURGERY      Neurostimulator implant for Parkinson's    BROW LIFT      CARPAL TUNNEL RELEASE Right     CHOLECYSTECTOMY      COLONOSCOPY      CYSTOSCOPY      HERNIA REPAIR      LUMBAR LAMINECTOMY      LYMPH NODE DISSECTION      ORCHIECTOMY      SHOULDER ARTHROSCOPY Right     TOTAL KNEE ARTHROPLASTY Bilateral     TURP      WRIST SURGERY Left     cyst removal        Medications Prior to Admission:     Prior to Admission medications    Not on File        Allergies:     Patient has no known allergies. Social History:     Tobacco:    reports that he has never smoked. He has never used smokeless tobacco.  Alcohol:      has no history on file for alcohol use. Drug Use:  has no history on file for drug use. Family History:     Family History   Problem Relation Age of Onset    Other Mother     Heart Failure Father     Other Sister     Behavior Problems Sister     No Known Problems Brother        Review of Systems:     Review of Systems   Constitutional: Negative. HENT: Negative. Eyes: Negative. Respiratory: Negative. Cardiovascular: Negative. Gastrointestinal: Negative. Endocrine: Negative. Genitourinary: Negative. Musculoskeletal: Negative. Bilateral lower extremity weakness. Skin: Negative. Allergic/Immunologic: Negative. Neurological: Negative. Hematological: Negative. Psychiatric/Behavioral: Negative. Annalee Yang Physical Exam:     Physical Exam   Vitals:    Vitals:    22 0536   BP:  138/88  (!) 149/99   Pulse:  65  64   Resp: 14 16 14 16   Temp:  98.3 °F (36.8 °C)  98.4 °F (36.9 °C)   TempSrc:  Oral  Oral   SpO2:    98%   Weight:       Height:                        Body mass index is 31.37 kg/m². Temp (24hrs), Av.4 °F (36.9 °C), Min:98.3 °F (36.8 °C), Max:98.4 °F (36.9 °C)    No results for input(s): POCGLU in the last 72 hours. General Appearance:   well apearing             Skin:                             No rash or erythema  HEENT ;                                                                       No icterus, no pallor . No ptosis.     No gross asymmetry  Or abnormality face         Neck:                            No mass , no thyroid enlargement Pulmonary/Chest:                                               Symmetric                                          Clear to auscultation bilaterally . No wheezes,                                          No rales or rhonchi . No abnormality on percussion                                                        Cardiovascular:            Normal rate, regular rhythm,                                          No murmur or  Gallop . Abdomen:                       Soft, non-tender                                           Normal bowels sounds,                                             Extremities:                    normal sensation.  Power 4/5 in bilateral lower extremities                                           Musculo-skeletal / Neurological ;;                                                                                               Investigations:      URINE ANALYSIS: No results found for: LABURIN     CBC:  Lab Results   Component Value Date/Time    WBC 7.0 12/16/2022 06:21 AM    HGB 12.5 12/16/2022 06:21 AM     12/16/2022 06:21 AM             BMP:    Lab Results   Component Value Date/Time     12/16/2022 06:21 AM    K 3.9 12/16/2022 06:21 AM     12/16/2022 06:21 AM    CO2 25 12/16/2022 06:21 AM    BUN 23 12/16/2022 06:21 AM    CREATININE 0.80 12/16/2022 06:21 AM    GLUCOSE 95 12/16/2022 06:21 AM      LIVER PROFILE:  Lab Results   Component Value Date/Time    ALT 44 12/16/2022 06:21 AM    AST 35 12/16/2022 06:21 AM    PROT 7.0 12/16/2022 06:21 AM    BILITOT 0.7 12/16/2022 06:21 AM    BILIDIR 0.2 12/16/2022 06:21 AM    LABALBU 4.0 12/16/2022 06:21 AM             @BRIEFLABT(TSH)@      Laboratory Testing:  Recent Results (from the past 24 hour(s))   Protime-INR    Collection Time: 12/18/22  6:34 AM   Result Value Ref Range    Protime 22.4 (H) 11.8 - 14.6 sec    INR 2.0 Imaging/Diagnostics:  No results found. Assessment :      Hospital Problems             Last Modified POA    * (Principal) Cord compression ([address] 12/15/2022 Yes    Moderate malnutrition ([address] 12/16/2022 Yes   Waleska Oglesby is a 79 y.o. Unavailable / unknown male who has past medical history of a . Fib and parkinson presents with back pain from spinal cord compression s/p laminectomy. He is admitted for acute rehabilitation. Laminectomy done at [facility] 1 week back. Plan:       Medications: Allergies:  No Known Allergies    Current Meds:   Scheduled Meds:    warfarin  5 mg Oral Once    Opicapone  50 mg Oral Nightly    mirabegron  50 mg Oral Nightly    carbidopa-levodopa  1 tablet Oral BID    carbidopa-levodopa  1 tablet Oral BID    escitalopram  20 mg Oral Daily    rasagiline mesylate  1 mg Oral Daily    polyethylene glycol  17 g Oral Daily    warfarin placeholder: dosing by pharmacy   Other RX Placeholder     Continuous Infusions:   PRN Meds: oxyCODONE-acetaminophen, diphenhydrAMINE-zinc acetate, tiZANidine, acetaminophen, senna, bisacodyl          12/18/2022    Admitted for acute rehab after laminectomy for spinal stenosis   Hemodynamically stable, saturating well in room air. Continue physical therapy and occupation therapy. Marie Umana MD      [address], [address] [place] [address]. Phone [phone]   Fax: [phone]  Answering Service: [phone]            12/18/2022  1:10 PM    Copy sent to Dr. Reina Bowen MD    Please note that this chart was generated using voice recognition Dragon dictation software. Although every effort was made to ensure the accuracy of this automated transcription, some errors in transcription may have occurred.

## 2022-12-18 NOTE — PROGRESS NOTES
Physical Therapy  Facility/Department: [facility] ACUTE REHAB  Rehabilitation Physical Therapy Treatment Note    NAME: Siomara Gonsalez  : 1955 (30 y.o.)  MRN: [id number]  CODE STATUS: Full Code    Date of Service: 22       Restrictions:  Restrictions/Precautions: Surgical Protocols; Fall Risk;General Precautions; Up as Tolerated  Required Braces or Orthoses  Spinal: Lumbar Corset     SUBJECTIVE  Subjective  Subjective: Pt postioned semifowlers, agreeable to tx. Pt long sits to done brace. with mention of Wife Sahara Olmstead, the pt begins to sob d/t missing his wife. Wife present in gym for AM tx not long after and pt becomes emotional again. Pt with improved spirits with wife's presence. Pain: Pt denies pain           OBJECTIVE  Orientation  Overall Orientation Status: Within Functional Limits    Functional Mobility  Bed Mobility  Additional Factors: Head of bed raised; With handrails  Roll Right  Assistance Level: Supervision  Supine to Sit  Assistance Level: Supervision  Balance  Sitting Balance: Supervision  Standing Balance: Contact guard assistance  Transfers  Surface: From bed; Wheelchair  Additional Factors: Hand placement cues; Increased time to complete; With handrails;Verbal cues; Set-up  Device: Walker (Rolling)  Sit to Stand  Assistance Level: Minimal assistance  Skilled Clinical Factors: VC for handplacement  Stand to Sit  Assistance Level: Minimal assistance  Bed To/From Chair  Technique: Stand pivot  Assistance Level: Contact guard assist  Stand Pivot  Assistance Level: Contact guard assist      Environmental Mobility  Ambulation  Surface: Level surface; Ramp  Device: Rolling walker  Distance: 225ft, 75ft in AM. 210ft in PM  Activity: Within Unit  Additional Factors: Increased time to complete;Set-up; Verbal cues  Gait Deviations: Narrow base of support  Skilled Clinical Factors: Pt with improved posture, Following VC for widening CLARA, Pt pauses to allow another pt to pass, with frozen gait upon restarting between distances. Slightly deviated path,able to self correct. Curb  Curb Height: 6''  Device: Rolling walker  Number of Curbs: 2  Additional Factors: Non-reciprocal going up;Non-reciprocal going down  Assistance Level: Contact guard assist  Skilled Clinical Factors: Pt requires cuing for technique, Fair teach back. Pt needing cues to get closer to edge of step prior to descending             PT Exercises  A/AROM Exercises: Seated B LE with #2  and emphasis based movements x10-15  Functional Mobility Circuit Training: STS and SPT from various surfaces x15  Postural Correction Exercises: in //bars: fWD and retro ambulation and alt marching with ABD board and mirror feed back for carry over in widened CLARA during ambulation. Heel raises on blue fowm x10. Emphasis based side stepping w B UE support and LGTB around B LE.      ASSESSMENT/PROGRESS TOWARDS GOALS       Assessment  Activity Tolerance: Patient tolerated treatment well;Treatment limited secondary to agitation (Pt depressive today)  Discharge Recommendations: Patient would benefit from continued therapy after discharge  PT Equipment Recommendations  Equipment Needed: Yes  Other: RW    Goals  Patient Goals   Patient Goals : patient \"I havn't really thought that far\". Spouse \"Strengthen Legs and improve safety\"  Short Term Goals  Time Frame for Short Term Goals: 7 days  Short Term Goal 1: pt to demo all Bed mobility with Rajni on flattened bed  Short Term Goal 2: pt to perform transfers with RW and Rajni  Short Term Goal 3: pt to ambulate 150' with RW and CGAx1  Short Term Goal 4: pt to negotiate single platform step + 2 successive steps (No rails) with Rajni x1 to allow for safe home access  Short Term Goal 5: pt to verbally identify 3/3 spinal precautions to maximize safety and functional outcomes.   Long Term Goals  Time Frame for Long Term Goals : Until D/C  Long Term Goal 1: pt to demo all Bed mobility on flattened bed with supervision  Long Term Goal 2: pt to perform transfers with RW and supervision  Long Term Goal 3: pt to ambulate 150' with RW and SBAx1  Long Term Goal 4: pt to negotiate single platform step + 2 successive steps (No rails) with SBA to allow for safe home access  Long Term Goal 5: pt to improve BLE strength by 1/2 MMG to improve safety with mobility  Additional Goals?: Yes  Long term goal 6: pt to improve Sitting and standing balance to FAIR or better to decrease risk for falls during mobility  Long term goal 7: pt to demo simulated or actual car transfer with SBA  Long term goal 8: pt to negotiate FF of steps with Bilateral Rail and SBA to allow for safe access to basement level of home. PLAN OF CARE/SAFETY  Physcial Therapy Plan  General Plan:  minutes of therapy at least 5 out of 7 days a week  Safety Devices  Type of Devices: Call light within reach; Chair alarm in place;Gait belt;Patient at risk for falls; Heels elevated for pressure relief; All fall risk precautions in place; Left in chair (Wife presnt.)      Therapy Time     12/18/22 0900 12/18/22 1538   PT Individual Minutes   Time In 0900 1440   Time Out 1000 1515   Minutes 60 1102 [place], [place], 12/18/22 at 3:39 PM

## 2022-12-18 NOTE — PROGRESS NOTES
Physical Medicine & Rehabilitation  Progress Note    12/18/2022 11:34 AM     CC: Ambulatory and ADL dysfunction due to spinal cord compression with paraparesis with Parkinson's    Subjective:   No Complaints. Wife present-questions answered    ROS:  Denies fevers, chills, sweats. No chest pain, palpitations, lightheadedness. Denies coughing, wheezing or shortness of breath. Denies abdominal pain, nausea, diarrhea or constipation. No new areas of joint pain. Denies new areas of numbness or weakness. Denies new anxiety or depression issues. No new skin problems. Rehabilitation:   PT:  Restrictions/Precautions: Surgical Protocols, Fall Risk, General Precautions, Up as Tolerated  Implants present? : Metal implants, Deep brain stimulator (Spinal Fusion and B TKAs)  Spinal Precautions: Avoid Excessive Bending, Lifting, and Twisting of spine  Other position/activity restrictions: LSO to be donned when OOB; PT/OT therapy staff may titrate oxygen down as tolerated in therapy, but may only titrate up to maximum 4 L NC as needed in therapy. Nursing should perform oxygen titration > 4 L. Required Braces or Orthoses  Spinal: Lumbar Corset   Transfers  Sit to Stand: Moderate Assistance  Stand to Sit: Moderate Assistance  Stand Pivot Transfers: Minimal Assistance  Comment: RW for multiple transfers (including toilet transfer); pt demos excessive posterior leaning upon rising and returning to sit. maximal VVT cues for safety, hands placement, and anterior weightshifting with limited carry over. Ambulation  Surface: Level tile  Device: Rolling Walker  Assistance: Moderate assistance (Mod A mainly for backward stepping)  Quality of Gait: Fwd amb: forward trunk, NBOS, mildly unsteady; Upon attempting to step backwards pt impulsivley and quickly starts back-stepping with heavy posterior lean and backs into wheelchair.   Gait Deviations: Decreased step height, Decreased step length, Decreased head and trunk rotation, Slow Katey, Shuffles  Distance: 3 feet x 3  Comments: worked on transfer and ambulation at bedside for technique safety from varied surfaces and surfaces height. spouse present and discussed mobility for couch height. More Ambulation?: Yes      OT:  ADL  Feeding: Stand by assistance  Feeding Skilled Clinical Factors: per wife report  Grooming: Minimal assistance  Grooming Skilled Clinical Factors: Min A with LUE to hold toothbrush due to pt dropping into sink mulitple times  UE Bathing: Maximum assistance  UE Bathing Skilled Clinical Factors: OT provided max verbal cues on this date to complete upper body bathing task with pt only able to minimally participate perserverating on washing the sink. [place] for minimal participation in task. Therapist A to complete. LE Bathing: Maximum assistance  LE Bathing Skilled Clinical Factors: Pt able to wash bilateral thighs while sitting in wheelchair. Pt required A with bilateral lower legs . Mod A for standing balance with therapist A to complete bolivar/bottom hygiene  UE Dressing: Maximum assistance  UE Dressing Skilled Clinical Factors: A for orientation of shirt with increased time. Pt able to find one sleeve with increased time and verbal cues. Pt required therapist A with threading LUE, putting short over head, and pulling down over chest and back. A with doffing/donning lumbar corset. LE Dressing: Dependent/Total  LE Dressing Skilled Clinical Factors: Therapist complete threading BLE and pulling up over hips. Mod A while standing for balance. Toileting: Dependent/Total  Toileting Skilled Clinical Factors: 2 person A while standing with A with clothing management and hygiene  Additional Comments: OT facilitated pts engagement in self care tasks on this date. Pt required max verbal/tactile cues with fair carryover. Pt currenlty limited due to decreased strength, FMC, balance, activity tolerance, and cognitive barriers impacting safety and independence with self care tasks. Balance  Sitting Balance: Moderate assistance (Pt sitting EOB with Mod A to sit upright due to posterior left side lean; pt demonstrated left side lean in wheelchair with pillow placed to assist with support)  Standing Balance: Dependent/Total (Mod A x 2 with posterior lean; Able to progress to Mod A x 1)      Functional Mobility  Functional - Mobility Device: Rolling Walker  Activity: To/from bathroom  Assist Level: Moderate assistance (Mod A x 1 and Min A x 1 with posterior lean with increased time; Pt able to progress to Mod A x 1 with small mobility from w/c to recliner chair)  Functional Mobility Comments: Verbal cues for hand placement and safety with Fair carryover. Mod A x 1 and Min A x 1 for safety with posterior lean. Pt able to progress to Mod A x 1 with RW from w/c to bed with max verbal cues for safety. Pt continues to demonstrate Posterior lean. Bed mobility  Supine to Sit: Moderate assistance (A with trunk)  Bed Mobility Comments: Bed mobility is not assessed this AM as pt is already up seated in wheelchair and retires to recliner at end of session  Transfers  Sit to stand: 2 Person assistance, Moderate assistance (Initially Mod A x 2 with pt able to progress to mod A x 1)  Stand to sit: 2 Person assistance, Moderate assistance (Initially Mod A x 2 with pt able to progress to mod A x 1)  Transfer Comments: Back braced donning while EOB with Mod A for sitting balance prior to transfers. Verbal cues for hand placement and safety with Fair carryover. Mod A x 2   Toilet Transfers  Toilet - Technique: Ambulating, Stand step  Equipment Used: Standard toilet  Toilet Transfer: 2 Person assistance, Moderate assistance  Toilet Transfers Comments: Pt completed mobility into bathroom for toileting task with 2 person A (Mod A x 1 and Min A x 1) with posterior lean. Mod A x 2 to safety sit onto toilet. Mod A x 2 to stand with verbal cue for hand placement and safety with posterior lean.  Mod A x 2 for stand step to wheelchair from toilet               ST:    Recommended Diet and Intervention  Recommended Diet: Regular  Recommended Liquid: Thin   Recommended Form of Meds: PO  Therapeutic Interventions: Diet tolerance monitoring;Oral motor exercises; Patient/Family education    Cognitive Diagnosis: Pt presents with a mild-moderate cognitive deficit characterized by impaired following complex commands, verbal reasoning, problem solving and sequencing. Short-term and working memory judged to be moderate-severely impaired. Pt and wife report gradual decline in cognition over the years. Per wife, specifically Pt has difficulty with memory. Pt's wife completes all time, medication and financial management at home. Speech Diagnosis: Pt presents with moderate dysarthria characterized by imprecise articulation, blended word boundaries, decreased breath support for speech, reduced vocal intensity (declining with duration) and reduced rate. O/M deficits present include reduced lingual and labial strength and ROM. Diagnosis: ST recommended to target cognition and motor speech disorder, with focus on compensatory strategy training d/t progressive nature of Parkinson's Disease. Objective:  BP (!) 149/99   Pulse 64   Temp 98.4 °F (36.9 °C) (Oral)   Resp 16   Ht 6' (1.829 m)   Wt 231 lb 4.8 oz (104.9 kg)   SpO2 98%   BMI 31.37 kg/m²  I Body mass index is 31.37 kg/m². I   Wt Readings from Last 1 Encounters:   12/15/22 231 lb 4.8 oz (104.9 kg)      Temp (24hrs), Av.4 °F (36.9 °C), Min:98.3 °F (36.8 °C), Max:98.4 °F (36.9 °C)         GEN: well developed, well nourished, no acute distress  HEENT: Normocephalic atraumatic, EOMI, mucous membranes pink and moist  CV: RRR, no murmurs, rubs or gallops  PULM: CTAB, no rales or rhonchi. Respirations WNL and unlabored  ABD: soft, NT, ND, +BS and equal  NEURO: A&O x3. Sensation intact to light touch.   Bradykinesia, resting tremors  MSK: 3/5 lower extremity 4+/5 upper extremities  EXTREMITIES: No calf tenderness to palpation bilaterally. No edema BLEs  SKIN: warm dry and intact with good turgor incision clean and intact  PSYCH: appropriately interactive. Affect WNL. Good spirits        Medications   Scheduled Meds:   warfarin  5 mg Oral Once    mirabegron  50 mg Oral Nightly    carbidopa-levodopa  1 tablet Oral BID    carbidopa-levodopa  1 tablet Oral BID    escitalopram  20 mg Oral Daily    rasagiline mesylate  1 mg Oral Daily    polyethylene glycol  17 g Oral Daily    warfarin placeholder: dosing by pharmacy   Other RX Placeholder     Continuous Infusions:  PRN Meds:.diphenhydrAMINE-zinc acetate, oxyCODONE-acetaminophen, tiZANidine, acetaminophen, senna, bisacodyl     Diagnostics:     CBC:   Recent Labs     12/16/22  0621   WBC 7.0   RBC 3.72*   HGB 12.5*   HCT 35.9*   MCV 96.7   RDW 13.8          BMP:   Recent Labs     12/16/22  0621      K 3.9      CO2 25   BUN 23   CREATININE 0.80       BNP: No results for input(s): BNP in the last 72 hours. PT/INR:   Recent Labs     12/16/22  0621 12/17/22  0620 12/18/22  0634   PROTIME 15.7* 17.8* 22.4*   INR 1.3 1.5 2.0       APTT: No results for input(s): APTT in the last 72 hours. CARDIAC ENZYMES: No results for input(s): CKMB, CKMBINDEX, TROPONINT in the last 72 hours. Invalid input(s): CKTOTAL;3  FASTING LIPID PANEL:No results found for: CHOL, HDL, TRIG  LIVER PROFILE:   Recent Labs     12/16/22  0621   AST 35   ALT 44*   BILIDIR 0.2   BILITOT 0.7   ALKPHOS 96          I/O (24Hr): Intake/Output Summary (Last 24 hours) at 12/18/2022 1134  Last data filed at 12/17/2022 1732  Gross per 24 hour   Intake 600 ml   Output --   Net 600 ml       Glu last 24 hour  No results for input(s): POCGLU in the last 72 hours.     No results for input(s): CLARITYU, COLORU, PHUR, [address], [address] [facility], [address] [place] [address], BLOODU, BACTERIA, NITRU, [address], LEUKOCYTESUR, YEAST, Ashley Oro in the last 72 hours. Impression/Plan:    Lumbar spinal cord compression with B paraparesis:  PT/OT for gait, mobility, strengthening, endurance, ADLs, and self care. S/p lumbar decompression L1-L4 laminectomy and fusion by Dr. Yana Bowen 12/7. OK to shower. laminectomy-incision clean, monitor  Pain-has Percocet prn pain and tizanidine prn spasm. Parkinson's Disease: treats with Dr. Jennifer Bhandari. On carbidopa-levodopa CR and IR, on rasagiline. Takes Ongentys at home -brought in supply-reviewed with pharmacy-resume  Depression: on escitalopram  OAB: on mirabegron. Has outpatient urology follow up. Atrial fibrillation: on warfarin. Titrating to therapeutic INR 2-3. Pharmacy dosing. INR subtherapeutic today 2.0  Bowel Management: Miralax daily, senokot prn, dulcolax prn. DVT Prophylaxis:  Coumadin with INR goal 2-3, SCD's while in bed, and OUSMANE's during the day  Internal medicine for medical management      Shelle Mortimer. Suresh Kapoor MD       This note is created with the assistance of a speech recognition program.  While intending to generate a document that actually reflects the content of the visit, the document can still have some errors including those of syntax and sound a like substitutions which may escape proof reading.   In such instances, actual meaning can be extrapolated by contextual diversion

## 2022-12-19 LAB
INR BLD: 2.4
PROTHROMBIN TIME: 25.8 SEC (ref 11.8–14.6)

## 2022-12-19 PROCEDURE — 99232 SBSQ HOSP IP/OBS MODERATE 35: CPT | Performed by: PHYSICAL MEDICINE & REHABILITATION

## 2022-12-19 PROCEDURE — 85610 PROTHROMBIN TIME: CPT

## 2022-12-19 PROCEDURE — 97110 THERAPEUTIC EXERCISES: CPT

## 2022-12-19 PROCEDURE — 99231 SBSQ HOSP IP/OBS SF/LOW 25: CPT | Performed by: INTERNAL MEDICINE

## 2022-12-19 PROCEDURE — 97535 SELF CARE MNGMENT TRAINING: CPT

## 2022-12-19 PROCEDURE — 97530 THERAPEUTIC ACTIVITIES: CPT

## 2022-12-19 PROCEDURE — 92507 TX SP LANG VOICE COMM INDIV: CPT

## 2022-12-19 PROCEDURE — 6370000000 HC RX 637 (ALT 250 FOR IP): Performed by: PHYSICAL MEDICINE & REHABILITATION

## 2022-12-19 PROCEDURE — 1180000000 HC REHAB R&B

## 2022-12-19 PROCEDURE — 97116 GAIT TRAINING THERAPY: CPT

## 2022-12-19 PROCEDURE — 6370000000 HC RX 637 (ALT 250 FOR IP): Performed by: STUDENT IN AN ORGANIZED HEALTH CARE EDUCATION/TRAINING PROGRAM

## 2022-12-19 PROCEDURE — 36415 COLL VENOUS BLD VENIPUNCTURE: CPT

## 2022-12-19 PROCEDURE — 97129 THER IVNTJ 1ST 15 MIN: CPT

## 2022-12-19 RX ORDER — SENNA PLUS 8.6 MG/1
2 TABLET ORAL NIGHTLY
Status: DISCONTINUED | OUTPATIENT
Start: 2022-12-19 | End: 2022-12-30 | Stop reason: HOSPADM

## 2022-12-19 RX ORDER — WARFARIN SODIUM 5 MG/1
5 TABLET ORAL
Status: COMPLETED | OUTPATIENT
Start: 2022-12-19 | End: 2022-12-19

## 2022-12-19 RX ORDER — POLYETHYLENE GLYCOL 3350 17 G/17G
17 POWDER, FOR SOLUTION ORAL 2 TIMES DAILY
Status: DISCONTINUED | OUTPATIENT
Start: 2022-12-19 | End: 2022-12-23

## 2022-12-19 RX ADMIN — CARBIDOPA AND LEVODOPA 1 TABLET: 25; 100 TABLET ORAL at 07:51

## 2022-12-19 RX ADMIN — SENNOSIDES 17.2 MG: 8.6 TABLET, FILM COATED ORAL at 19:58

## 2022-12-19 RX ADMIN — ESCITALOPRAM OXALATE 20 MG: 10 TABLET ORAL at 07:48

## 2022-12-19 RX ADMIN — CARBIDOPA AND LEVODOPA 1 TABLET: 25; 100 TABLET ORAL at 20:00

## 2022-12-19 RX ADMIN — CARBIDOPA AND LEVODOPA 1 TABLET: 25; 100 TABLET, EXTENDED RELEASE ORAL at 07:50

## 2022-12-19 RX ADMIN — POLYETHYLENE GLYCOL 3350 17 G: 17 POWDER, FOR SOLUTION ORAL at 07:48

## 2022-12-19 RX ADMIN — RASAGILINE 1 MG: 1 TABLET ORAL at 07:49

## 2022-12-19 RX ADMIN — POLYETHYLENE GLYCOL 3350 17 G: 17 POWDER, FOR SOLUTION ORAL at 19:58

## 2022-12-19 RX ADMIN — CARBIDOPA AND LEVODOPA 1 TABLET: 25; 100 TABLET, EXTENDED RELEASE ORAL at 20:00

## 2022-12-19 RX ADMIN — WARFARIN SODIUM 5 MG: 5 TABLET ORAL at 17:42

## 2022-12-19 RX ADMIN — BISACODYL 10 MG: 10 SUPPOSITORY RECTAL at 19:58

## 2022-12-19 ASSESSMENT — ENCOUNTER SYMPTOMS
ALLERGIC/IMMUNOLOGIC NEGATIVE: 1
GASTROINTESTINAL NEGATIVE: 1
RESPIRATORY NEGATIVE: 1
EYES NEGATIVE: 1

## 2022-12-19 NOTE — CARE COORDINATION
ONGOING ARU DISCHARGE PLAN:    Patient is alert and oriented x4. Spoke with patient regarding discharge plan and patient confirms plan is to attend OP therapy. Confirmed patient has an appointment on 12/22/22 with Dr Kathi Felder at 1140 am. Transportation set up through [facility]. Confirmation received. DME: None ordered today    Will continue to follow for additional discharge needs.     Electronically signed by Belgica Lugo RN on 12/19/2022 at 12:22 PM

## 2022-12-19 NOTE — PROGRESS NOTES
Speech Language Pathology  Speech Language Pathology  [facility] Acute Rehab Unit at [facility]    Speech Language/cognitive Treatment Note    Date: 12/19/2022  Patients Name: Amarilis Scott  MRN: [id number]  Diagnosis:   Patient Active Problem List   Diagnosis Code    Atrial fibrillation [facility]) I48.91    Chronic back pain M54.9, G89.29    Erectile dysfunction N52.9    Gait disorder R26.9    Hypertension I10    Lower urinary tract symptoms R39.9    Malignant neoplasm of testis (HCC) C62.90    Osteoarthrosis M19.90    Parkinson disease ([address] G20    Peripheral nerve disease G62.9    Cord compression ([address] G95.20    Moderate malnutrition ([address] E44.0       Pain: none reported    Speech and Language Treatment  Treatment time: 7963-9580    Subjective: [x] Alert [x] Cooperative     [] Confused     [] Agitated    [] Lethargic      Objective/Assessment:    Speech: Pt. Demonstrated moderate dysarthria characterized by rapid rate of speech, imprecise articulation and reduced intensity. Intelligibility rated at 60%. Pt. Kendra Constantino. Re: compensatory dysarthria strategies and able to repeat bisyllabic words and phrases given model 100% of the time, pt. Having difficulty carrying over into conversation/structured tasks. Cognition: image retention (15 min delay)- 60%, 4 word attribute inclusion- 67%, 100% c cues. Name 12 items in category- 75%, 100% c cues. Pt. Demonstrated prolonged response latency. Pt. Wife present. Plan:  [x] Continue ST services    [] Discharge from ST:      Discharge recommendations: []  Further therapy recommended at discharge. The patient should be able to tolerate at least 3 hours of therapy per day over 5 days or 15 hours over 7 days. [] Further therapy recommended at discharge. [] No therapy recommended at discharge.       Treatment completed by: Mirella Henriquez, SLP, M.A., Brett

## 2022-12-19 NOTE — PLAN OF CARE
Problem: Discharge Planning  Goal: Discharge to home or other facility with appropriate resources  Outcome: Progressing     Problem: Skin/Tissue Integrity  Goal: Absence of new skin breakdown  Description: 1. Monitor for areas of redness and/or skin breakdown  2. Assess vascular access sites hourly  3. Every 4-6 hours minimum:  Change oxygen saturation probe site  4. Every 4-6 hours:  If on nasal continuous positive airway pressure, respiratory therapy assess nares and determine need for appliance change or resting period. Outcome: Progressing     Problem: Safety - Adult  Goal: Free from fall injury  Outcome: Progressing     Problem: Pain  Goal: Verbalizes/displays adequate comfort level or baseline comfort level  Outcome: Progressing     Problem: Confusion  Goal: Confusion, delirium, dementia, or psychosis is improved or at baseline  Description: INTERVENTIONS:  1. Assess for possible contributors to thought disturbance, including medications, impaired vision or hearing, underlying metabolic abnormalities, dehydration, psychiatric diagnoses, and notify attending LIP  2. [place] high risk fall precautions, as indicated  3. Provide frequent short contacts to provide reality reorientation, refocusing and direction  4. Decrease environmental stimuli, including noise as appropriate  5. Monitor and intervene to maintain adequate nutrition, hydration, elimination, sleep and activity  6. If unable to ensure safety without constant attention obtain sitter and review sitter guidelines with assigned personnel  7.  Initiate Psychosocial CNS and Spiritual Care consult, as indicated  Outcome: Progressing     Problem: ABCDS Injury Assessment  Goal: Absence of physical injury  Outcome: Progressing     Problem: Nutrition Deficit:  Goal: Optimize nutritional status  Outcome: Progressing

## 2022-12-19 NOTE — PLAN OF CARE
Problem: Safety - Adult  Goal: Free from fall injury  12/19/2022 1714 by Gene Lind RN  Outcome: Progressing  Flowsheets (Taken 12/19/2022 1714)  Free From Fall Injury: Instruct family/caregiver on patient safety  Note: Patient remains free of falls and injuries throughout shift. Bed remains in the lowest position, wheels locked, call light and bedside table are within reach. Problem: Skin/Tissue Integrity  Goal: Absence of new skin breakdown  Description: 1. Monitor for areas of redness and/or skin breakdown  2. Assess vascular access sites hourly  3. Every 4-6 hours minimum:  Change oxygen saturation probe site  4. Every 4-6 hours:  If on nasal continuous positive airway pressure, respiratory therapy assess nares and determine need for appliance change or resting period. 12/19/2022 1714 by Gene Lind RN  Outcome: Progressing     Problem: Pain  Goal: Verbalizes/displays adequate comfort level or baseline comfort level  12/19/2022 1714 by Gene Lind RN  Outcome: Progressing  Flowsheets (Taken 12/19/2022 1714)  Verbalizes/displays adequate comfort level or baseline comfort level:   Encourage patient to monitor pain and request assistance   Assess pain using appropriate pain scale   Administer analgesics based on type and severity of pain and evaluate response   Implement non-pharmacological measures as appropriate and evaluate response   Consider cultural and social influences on pain and pain management   Notify Licensed Independent Practitioner if interventions unsuccessful or patient reports new pain  Note: Patient's pain is well controlled with current regimen. See MAR. Problem: Confusion  Goal: Confusion, delirium, dementia, or psychosis is improved or at baseline  Description: INTERVENTIONS:  1.  Assess for possible contributors to thought disturbance, including medications, impaired vision or hearing, underlying metabolic abnormalities, dehydration, psychiatric diagnoses, and notify attending LIP  2. [place] high risk fall precautions, as indicated  3. Provide frequent short contacts to provide reality reorientation, refocusing and direction  4. Decrease environmental stimuli, including noise as appropriate  5. Monitor and intervene to maintain adequate nutrition, hydration, elimination, sleep and activity  6. If unable to ensure safety without constant attention obtain sitter and review sitter guidelines with assigned personnel  7.  Initiate Psychosocial CNS and Spiritual Care consult, as indicated  12/19/2022 1714 by Ivanna Shelton RN  Outcome: Progressing     Problem: ABCDS Injury Assessment  Goal: Absence of physical injury  12/19/2022 1714 by Ivanna Shelton RN  Outcome: Progressing     Problem: Nutrition Deficit:  Goal: Optimize nutritional status  12/19/2022 1714 by Ivanna Shelton RN  Outcome: Progressing     Problem: Discharge Planning  Goal: Discharge to home or other facility with appropriate resources  12/19/2022 1714 by Ivanna Shelton RN  Outcome: Progressing

## 2022-12-19 NOTE — PROGRESS NOTES
Pharmacy Note  Warfarin Consult follow-up      Recent Labs     12/17/22  0620 12/18/22  0634 12/19/22  0633   INR 1.5 2.0 2.4     No results for input(s): HGB, HCT, PLT in the last 72 hours. Significant Drug-Drug Interactions:  New warfarin drug-drug interactions: None  Discontinued drug-drug interactions: None  Current warfarin drug-drug interactions: Lexapro      Date             INR        Dose given previous day  Dose scheduled for today  12/19/2022            2.4       5mg           5mg        Notes:                     Daily PT/INR while inpatient.    [address], [place]   12/19/2022  11:32 AM

## 2022-12-19 NOTE — PROGRESS NOTES
Physical Therapy  Facility/Department: [facility] ACUTE REHAB  Rehabilitation Physical Therapy    NAME: Amarilis Scott  : 1955 (79 y.o.)  MRN: [id number]  CODE STATUS: Full Code    Date of Service: 22      Past Medical History:   Diagnosis Date    Anxiety     Asthma     Atrial fibrillation (HCC)     Back pain     BPH (benign prostatic hyperplasia)     Dental disease     Depression     Diarrhea     MALONE (dyspnea on exertion)     Frequent falls     GERD (gastroesophageal reflux disease)     Memory loss     Obesity     Panic disorder     Parkinson's disease ([address]     Testicular cancer ([facility] [address]     Visual impairment      Past Surgical History:   Procedure Laterality Date    BRAIN SURGERY      Neurostimulator implant for Parkinson's    BROW LIFT      CARPAL TUNNEL RELEASE Right     CHOLECYSTECTOMY      COLONOSCOPY      CYSTOSCOPY      HERNIA REPAIR      LUMBAR LAMINECTOMY      LYMPH NODE DISSECTION      ORCHIECTOMY      SHOULDER ARTHROSCOPY Right     TOTAL KNEE ARTHROPLASTY Bilateral     TURP      WRIST SURGERY Left     cyst removal       Chart Reviewed: Yes  Patient assessed for rehabilitation services?: Yes  Additional Pertinent Hx: Saumya Saenz is a 79 y.o. male With medical history of Atrial Fib and parkinsons disease who presented to undergo spinal surgery to remediate L1-2 stenosis and degenerative spondylolisthesis at L3-4 and presents with post-op back pain from spinal cord compression s/p laminectomy of L1-4 and PLIF L3-4 performed by Dr. Christina Gonzalez at [facility] on 22 . The patient admits to  [facility] / Caregiver Present: No  Referring Practitioner: Dr. Sydney Matos  Referral Date : 12/15/22  Diagnosis: Spinal Cord Compression  General Comment  Comments: [id number] Deepti Garcia per Nurse Jace Landau to proceed with therapy assessments    Restrictions:  Restrictions/Precautions: Surgical Protocols; Fall Risk;General Precautions; Up as Tolerated  Required Braces or Orthoses  Spinal: Lumbar Corset  Position Activity Restriction  Spinal Precautions: Avoid Excessive Bending, Lifting, and Twisting of spine  Other position/activity restrictions: LSO to be donned when OOB; PT/OT therapy staff may titrate oxygen down as tolerated in therapy, but may only titrate up to maximum 4 L NC as needed in therapy. Nursing should perform oxygen titration > 4 L. SUBJECTIVE  Subjective: pt is agreeable to therapy assessments;            OBJECTIVE                      Functional Mobility  Bed mobility  Bed Mobility Comments: Bed mobility is not assessed this AM as pt is already up seated in wheelchair and retires to recliner at end of session  Transfers  Sit to Stand: Minimal Assistance  Stand to Sit: Minimal Assistance  Bed to Chair: Minimal assistance  Stand Pivot Transfers: Minimal Assistance  Comment: posterior lean with transfers. education instruction for forward nose over toes rocking to assist patient technique. Environmental Mobility  Ambulation  Surface: Level tile; Ramp  Device: Rolling Walker  Other Apparatus: Wheelchair follow (spouse assisting with follow)  Assistance: Minimal assistance  Quality of Gait: heavy flexed at knees; difficulty with turns  Gait Deviations: Decreased step height;Decreased step length;Decreased head and trunk rotation; Slow Katey; Shuffles (on RLE)  Distance: 50 feet x 4; ramp 30 feet  Comments: worked on transfer and ambulation at bedside for technique safety from varied surfaces and surfaces height. spouse present and discussed mobility for couch height. More Ambulation?: Yes  Ambulation 2  Surface - 2: level tile  Device 2: Lau rail  Assistance 2: Minimal assistance  Quality of Gait 2: forward trunk, very NBOS, unsteady, intermittent scissor gait (especially during turning), Flatfoot initial contact bilaterally, and difficulty with turns  Gait Deviations: Decreased step length;Decreased step height;Decreased head and trunk rotation; Shuffles; Slow Katey  Distance: 40 feet  Comments: Maximal verbal, visual, tactile cues for posture, step width, straightening knees, and maintaining forward gaze. Michelle Red all of which the patient has difficulty maintaining for > 5 seconds when focusing to correct deviations. Stairs  # Steps : 6  Stairs Height: 8\"  Rails: Right ascending  Assistance: Moderate assistance    PT Exercises  A/AROM Exercises: Seated B LE with #2  and emphasis based movements x10-15  Functional Mobility Circuit Training: STS and SPT from various surfaces x15  Postural Correction Exercises: in //bars: fWD and retro ambulation and alt marching with ABD board and mirror feed back for carry over in widened CLARA during ambulation. Heel raises on blue fowm x10. Emphasis based side stepping w B UE support and LGTB around B LE. Exercise Equipment: NuStep 2 laps average 72 SPM,  L4    ASSESSMENT       Activity Tolerance  Activity Tolerance: Patient limited by fatigue  Activity Tolerance Comments: frequent standing rest breaks d/t noted SOB with activity. Need chair immediately to sit at times, spouse supportive of needs at this time. GOALS  Patient Goals   Patient Goals : patient \"I havn't really thought that far\". Spouse \"Strengthen Legs and improve safety\"  Short Term Goals  Time Frame for Short Term Goals: 7 days  Short Term Goal 1: pt to demo all Bed mobility with Rajni on flattened bed  Short Term Goal 2: pt to perform transfers with RW and Rajni  Short Term Goal 3: pt to ambulate 150' with RW and CGAx1  Short Term Goal 4: pt to negotiate single platform step + 2 successive steps (No rails) with Rajni x1 to allow for safe home access  Short Term Goal 5: pt to verbally identify 3/3 spinal precautions to maximize safety and functional outcomes.   Long Term Goals  Time Frame for Long Term Goals : Until D/C  Long Term Goal 1: pt to demo all Bed mobility on flattened bed with supervision  Long Term Goal 2: pt to perform transfers with RW and supervision  Long Term Goal 3: pt to ambulate 150' with RW and SBAx1  Long Term Goal 4: pt to negotiate single platform step + 2 successive steps (No rails) with SBA to allow for safe home access  Long Term Goal 5: pt to improve BLE strength by 1/2 MMG to improve safety with mobility  Additional Goals?: Yes  Long term goal 6: pt to improve Sitting and standing balance to FAIR or better to decrease risk for falls during mobility  Long term goal 7: pt to demo simulated or actual car transfer with SBA  Long term goal 8: pt to negotiate FF of steps with Bilateral Rail and SBA to allow for safe access to basement level of home. PLAN OF CARE  Frequency: 1-2 treatment sessions per day, 5-7 days per week  Safety Devices  Type of Devices: Call light within reach; Chair alarm in place; Left in chair (spouse present)    EDUCATION  Education  Education Given To: Patient; Family  Education Provided:  Mobility Training;Transfer Training      Therapy Time   12/19/22 1111 12/19/22 1455   PT Individual Minutes   Time In [id number]   Time Out 1158 1402   Minutes 56 Barbara Parikh PTA, 12/19/22 at 2:55 PM

## 2022-12-19 NOTE — PROGRESS NOTES
Physical Medicine & Rehabilitation  Progress Note    12/19/2022 11:29 AM     CC: Ambulatory and ADL dysfunction due to spinal cord compression with paraparesis with Parkinson's    Subjective:   No Complaints. Wife present-questions answered    ROS:  Denies fevers, chills, sweats. No chest pain, palpitations, lightheadedness. Denies coughing, wheezing or shortness of breath. Denies abdominal pain, nausea, diarrhea or constipation. No new areas of joint pain. Denies new areas of numbness or weakness. Denies new anxiety or depression issues. No new skin problems. Rehabilitation:   PT:  Restrictions/Precautions: Surgical Protocols, Fall Risk, General Precautions, Up as Tolerated  Implants present? : Metal implants, Deep brain stimulator (Spinal Fusion and B TKAs)  Spinal Precautions: Avoid Excessive Bending, Lifting, and Twisting of spine  Other position/activity restrictions: LSO to be donned when OOB; PT/OT therapy staff may titrate oxygen down as tolerated in therapy, but may only titrate up to maximum 4 L NC as needed in therapy. Nursing should perform oxygen titration > 4 L. Required Braces or Orthoses  Spinal: Lumbar Corset   Transfers  Sit to Stand: Moderate Assistance  Stand to Sit: Moderate Assistance  Stand Pivot Transfers: Minimal Assistance  Comment: RW for multiple transfers (including toilet transfer); pt demos excessive posterior leaning upon rising and returning to sit. maximal VVT cues for safety, hands placement, and anterior weightshifting with limited carry over. Ambulation  Surface: Level tile  Device: Rolling Walker  Assistance: Moderate assistance (Mod A mainly for backward stepping)  Quality of Gait: Fwd amb: forward trunk, NBOS, mildly unsteady; Upon attempting to step backwards pt impulsivley and quickly starts back-stepping with heavy posterior lean and backs into wheelchair.   Gait Deviations: Decreased step height, Decreased step length, Decreased head and trunk rotation, Slow Katey, Shuffles  Distance: 3 feet x 3  Comments: worked on transfer and ambulation at bedside for technique safety from varied surfaces and surfaces height. spouse present and discussed mobility for couch height. More Ambulation?: Yes      OT:  ADL  Feeding: Stand by assistance  Feeding Skilled Clinical Factors: per wife report  Grooming: Minimal assistance  Grooming Skilled Clinical Factors: Min A with LUE to hold toothbrush due to pt dropping into sink mulitple times  UE Bathing: Maximum assistance  UE Bathing Skilled Clinical Factors: OT provided max verbal cues on this date to complete upper body bathing task with pt only able to minimally participate perserverating on washing the sink. [place] for minimal participation in task. Therapist A to complete. LE Bathing: Maximum assistance  LE Bathing Skilled Clinical Factors: Pt able to wash bilateral thighs while sitting in wheelchair. Pt required A with bilateral lower legs . Mod A for standing balance with therapist A to complete bolivar/bottom hygiene  UE Dressing: Maximum assistance  UE Dressing Skilled Clinical Factors: A for orientation of shirt with increased time. Pt able to find one sleeve with increased time and verbal cues. Pt required therapist A with threading LUE, putting short over head, and pulling down over chest and back. A with doffing/donning lumbar corset. LE Dressing: Dependent/Total  LE Dressing Skilled Clinical Factors: Therapist complete threading BLE and pulling up over hips. Mod A while standing for balance. Toileting: Dependent/Total  Toileting Skilled Clinical Factors: 2 person A while standing with A with clothing management and hygiene  Additional Comments: OT facilitated pts engagement in self care tasks on this date. Pt required max verbal/tactile cues with fair carryover. Pt currenlty limited due to decreased strength, FMC, balance, activity tolerance, and cognitive barriers impacting safety and independence with self care tasks. Balance  Sitting Balance: Moderate assistance (Pt sitting EOB with Mod A to sit upright due to posterior left side lean; pt demonstrated left side lean in wheelchair with pillow placed to assist with support)  Standing Balance: Dependent/Total (Mod A x 2 with posterior lean; Able to progress to Mod A x 1)      Functional Mobility  Functional - Mobility Device: Rolling Walker  Activity: To/from bathroom  Assist Level: Moderate assistance (Mod A x 1 and Min A x 1 with posterior lean with increased time; Pt able to progress to Mod A x 1 with small mobility from w/c to recliner chair)  Functional Mobility Comments: Verbal cues for hand placement and safety with Fair carryover. Mod A x 1 and Min A x 1 for safety with posterior lean. Pt able to progress to Mod A x 1 with RW from w/c to bed with max verbal cues for safety. Pt continues to demonstrate Posterior lean. Bed mobility  Supine to Sit: Moderate assistance (A with trunk)  Bed Mobility Comments: Bed mobility is not assessed this AM as pt is already up seated in wheelchair and retires to recliner at end of session  Transfers  Sit to stand: 2 Person assistance, Moderate assistance (Initially Mod A x 2 with pt able to progress to mod A x 1)  Stand to sit: 2 Person assistance, Moderate assistance (Initially Mod A x 2 with pt able to progress to mod A x 1)  Transfer Comments: Back braced donning while EOB with Mod A for sitting balance prior to transfers. Verbal cues for hand placement and safety with Fair carryover. Mod A x 2   Toilet Transfers  Toilet - Technique: Ambulating, Stand step  Equipment Used: Standard toilet  Toilet Transfer: 2 Person assistance, Moderate assistance  Toilet Transfers Comments: Pt completed mobility into bathroom for toileting task with 2 person A (Mod A x 1 and Min A x 1) with posterior lean. Mod A x 2 to safety sit onto toilet. Mod A x 2 to stand with verbal cue for hand placement and safety with posterior lean.  Mod A x 2 for stand step to wheelchair from toilet               ST:    Treatment time: 0270-8205     Subjective: [x] Alert     [x] Cooperative     [] Confused     [] Agitated    [] Lethargic        Objective/Assessment:     Speech: Pt. Demonstrated moderate dysarthria characterized by rapid rate of speech, imprecise articulation and reduced intensity. Intelligibility rated at 60%. Pt. Luis Mendoza. Re: compensatory dysarthria strategies and able to repeat bisyllabic words and phrases given model 100% of the time, pt. Having difficulty carrying over into conversation/structured tasks. Cognition: image retention (15 min delay)- 60%, 4 word attribute inclusion- 67%, 100% c cues. Name 12 items in category- 75%, 100% c cues. Pt. Demonstrated prolonged response latency. Pt. Wife present. Plan:  [x] Continue ST services    [] Discharge from ST:      Objective:  /79   Pulse 69   Temp 98.8 °F (37.1 °C) (Oral)   Resp 18   Ht 6' (1.829 m)   Wt 231 lb 4.8 oz (104.9 kg)   SpO2 96%   BMI 31.37 kg/m²  I Body mass index is 31.37 kg/m². I   Wt Readings from Last 1 Encounters:   12/15/22 231 lb 4.8 oz (104.9 kg)      Temp (24hrs), Av.3 °F (36.8 °C), Min:97.5 °F (36.4 °C), Max:98.8 °F (37.1 °C)         GEN: well developed, well nourished, no acute distress  HEENT: Normocephalic atraumatic, EOMI, mucous membranes pink and moist  CV: RRR, no murmurs, rubs or gallops  PULM: CTAB, no rales or rhonchi. Respirations WNL and unlabored  ABD: soft, NT, ND, +BS and equal  NEURO: A&O x3. Sensation intact to light touch. Bradykinesia, resting tremors  MSK: 3/5 lower extremity 4+/5 upper extremities  EXTREMITIES: No calf tenderness to palpation bilaterally. No edema BLEs  SKIN: warm dry and intact with good turgor incision clean and intact  PSYCH: appropriately interactive. Affect WNL.          Medications   Scheduled Meds:   Opicapone  50 mg Oral Nightly    mirabegron  50 mg Oral Nightly    carbidopa-levodopa  1 tablet Oral BID carbidopa-levodopa  1 tablet Oral BID    escitalopram  20 mg Oral Daily    rasagiline mesylate  1 mg Oral Daily    polyethylene glycol  17 g Oral Daily    warfarin placeholder: dosing by pharmacy   Other RX Placeholder     Continuous Infusions:  PRN Meds:.oxyCODONE-acetaminophen, diphenhydrAMINE-zinc acetate, tiZANidine, acetaminophen, senna, bisacodyl     Diagnostics:     CBC:   No results for input(s): WBC, RBC, HGB, HCT, MCV, RDW, PLT in the last 72 hours. BMP:   No results for input(s): NA, K, CL, CO2, PHOS, BUN, CREATININE, CA in the last 72 hours. BNP: No results for input(s): BNP in the last 72 hours. PT/INR:   Recent Labs     12/17/22  0620 12/18/22  0634 12/19/22  0633   PROTIME 17.8* 22.4* 25.8*   INR 1.5 2.0 2.4       APTT: No results for input(s): APTT in the last 72 hours. CARDIAC ENZYMES: No results for input(s): CKMB, CKMBINDEX, TROPONINT in the last 72 hours. Invalid input(s): CKTOTAL;3  FASTING LIPID PANEL:No results found for: CHOL, HDL, TRIG  LIVER PROFILE:   No results for input(s): AST, ALT, ALB, BILIDIR, BILITOT, ALKPHOS in the last 72 hours. I/O (24Hr): No intake or output data in the 24 hours ending 12/19/22 1129      Glu last 24 hour  No results for input(s): POCGLU in the last 72 hours. No results for input(s): CLARITYU, COLORU, PHUR, SPECGRAV, PROTEINU, RBCUA, BLOODU, BACTERIA, NITRU, WBCUA, LEUKOCYTESUR, YEAST, GLUCOSEU, BILIRUBINUR in the last 72 hours. Impression/Plan:    Lumbar spinal cord compression with B paraparesis:  PT/OT for gait, mobility, strengthening, endurance, ADLs, and self care. S/p lumbar decompression L1-L4 laminectomy and fusion by Dr. Sindhu Mercado 12/7. OK to shower. laminectomy-incision clean, monitor GEN clean-has staples-clarify with Dr. Sindhu Mercado, if luis miguel can be removed, patient notes has follow-up pending ( surgery 12/7)  Pain-has Percocet prn pain and tizanidine prn spasm. Parkinson's Disease: treats with Dr. Macho Gould.  On carbidopa-levodopa CR and IR, on rasagiline. Takes Ongentys at home -brought in supply-reviewed with pharmacy-resume  Depression: on escitalopram  OAB: on mirabegron. Has outpatient urology follow up. Atrial fibrillation: on warfarin. Titrating to therapeutic INR 2-3. Pharmacy dosing. INR subtherapeutic today 2.5  Bowel Management: Miralax daily, senokot prn, dulcolax prn. BM documented 12/14-patient asymptomatic, abdomen soft nontender, no nausea or vomiting patient, informed has been refusing medication-discussed with patient and wife-now agreeable -make Senokot nightly, increase MiraLAX twice daily, suppository tonight  DVT Prophylaxis:  Coumadin with INR goal 2-3, SCD's while in bed, and OUSMANE's during the day  Internal medicine for medical management      Jorge Ayala MD       This note is created with the assistance of a speech recognition program.  While intending to generate a document that actually reflects the content of the visit, the document can still have some errors including those of syntax and sound a like substitutions which may escape proof reading.   In such instances, actual meaning can be extrapolated by contextual diversion

## 2022-12-19 NOTE — PROGRESS NOTES
[address] [facility]    Acute Rehabilitation Occupational Therapy Daily Treatment Note    Date: 22  Patient Name: Theron Quintero       Room: 5916/2368-27  MRN: [id number]  Account: [de-identified]   : 1955  (78 y.o.) Gender: male       Referring Practitioner: Alireza Kendrick MD  Diagnosis: Cord compression s/p surgical decompression/fusion L1-4  Additional Pertinent Hx: Patient with spinal cord compression who had L1-4 laminectomy and fusion performed by Dr. Bebo Jason formarked L1-2 stenosis and degenerative spondylolisthesis at L3-4 on 22. Medical records indicate some post-op confusion. Pt admitted to ARU on 12/15/22. Treatment Diagnosis: Impaired self care status    Past Medical History:  has a past medical history of Anxiety, Asthma, Atrial fibrillation ([address] Back pain, BPH (benign prostatic hyperplasia), Dental disease, Depression, Diarrhea, MALONE (dyspnea on exertion), Frequent falls, GERD (gastroesophageal reflux disease), Memory loss, Obesity, Panic disorder, Parkinson's disease ([address] Testicular cancer ([address] and Visual impairment. Past Surgical History:   has a past surgical history that includes Cholecystectomy; hernia repair; Total knee arthroplasty (Bilateral); lumbar laminectomy; Orchiectomy; brow lift; Carpal tunnel release (Right); Shoulder arthroscopy (Right); Wrist surgery (Left); Colonoscopy; Cystoscopy; brain surgery; TURP; and lymph node dissection.     Restrictions  Restrictions/Precautions  Restrictions/Precautions: Surgical Protocols, Fall Risk, General Precautions, Up as Tolerated  Required Braces or Orthoses?: Yes  Implants present? : Metal implants, Deep brain stimulator (Spinal Fusion and B TKAs)  Required Braces or Orthoses  Spinal: Lumbar Corset  Position Activity Restriction  Spinal Precautions: Avoid Excessive Bending, Lifting, and Twisting of spine  Other position/activity restrictions: LSO to be donned when OOB; PT/OT therapy staff may titrate oxygen down as tolerated in therapy, but may only titrate up to maximum 4 L NC as needed in therapy. Nursing should perform oxygen titration > 4 L. Vitals  Vital Signs  O2 Device: None (Room air)     Subjective  Subjective  Subjective: \"I can't complain. \"  Pain Assessment  Pain Assessment: None - Denies Pain    Objective  Cognition  Overall Orientation Status: Within Functional Limits  Orientation Level: Oriented X4    Cognition  Overall Cognitive Status: Exceptions  Arousal/Alertness: Delayed responses to stimuli  Following Commands: Follows one step commands with increased time; Follows one step commands with repetition  Attention Span: Difficulty attending to directions  Memory: Decreased short term memory;Decreased recall of precautions;Decreased recall of recent events  Safety Judgement: Decreased awareness of need for assistance;Decreased awareness of need for safety  Problem Solving: Assistance required to identify errors made;Assistance required to correct errors made;Assistance required to implement solutions;Assistance required to generate solutions;Decreased awareness of errors  Insights: Decreased awareness of deficits  Initiation: Requires cues for some  Sequencing: Requires cues for some    Activities of Daily Living  Feeding  Assistance Level: Modified independent  Skilled Clinical Factors: per spouse and pt report    Grooming/Oral Hygiene  Assistance Level: Stand by assist  Skilled Clinical Factors: seated at sink to wash face and brush teeth. Pt able to use R hand for holding toothbrush with no droppage noted. Upper Extremity Bathing  Assistance Level: Set-up  Skilled Clinical Factors: Completes while semi supine in bed, VC for initiation. Lower Extremity Bathing  Assistance Level: Contact guard assist  Skilled Clinical Factors: Pt washes buttock/bolivar area while standing at sink with CGA. Setup to apply soap to washcloth, VC for initation of each step.     Upper Extremity Dressing  Assistance Level: Maximum assistance  Skilled Clinical Factors: Completed while semi supine in bed, Max A for shirt and lumbar corset. Spouse present for assist    Lower Extremity Dressing  Assistance Level: Maximum assistance  Skilled Clinical Factors: A with threading pants to avoid excess bending, Pt able to manage up/down over hips while standing at the sink with CGA and VC for straightening B knees. Putting On/Taking Off Footwear  Assistance Level: Maximum assistance  Skilled Clinical Factors: Able to slighty achieve figure four tech to initiate donning B socks, requires A for pulling on completly. Mobility           Supine to Sit  Assistance Level: Minimal assistance  Skilled Clinical Factors: A for trunk  Scooting  Assistance Level: Contact guard assist  Skilled Clinical Factors: hips to EOB       Sit to Stand  Assistance Level: Minimal assistance  Stand to Sit  Assistance Level: Minimal assistance  Skilled Clinical Factors: Max tactile cues for hand placement. Intermittent Min A for posterior lean. Bed To/From Chair  Technique: Stand step;Stand pivot  Assistance Level: Contact guard assist  Skilled Clinical Factors: Max cues for hand placement and sequencing. Stand Pivot  Assistance Level: Contact guard assist  Skilled Clinical Factors: Max cues for hand placement and sequencing. Functional Mobility  Device: Rolling walker  Activity:  (short distance w/c<>recliner)  Assistance Level: Minimal assistance  Skilled Clinical Factors: VC for upright posture and wide CLARA during functional mobility. Intermittent Min A for posterior lean. no LOB            OT Exercises  Exercise Treatment: BUE exercises for 10 reps with 2# weight  in all tolerated planes to address overall strength needed for functional transfers.  Pt req intermittent RB d/t weakness  Dynamic Standing Balance Exercises: PM: Pt tolerates standing for 2-3 mins x6  to complete tabletop task faciiltating increased tolerance and balance for safety with ADLs. Moderate  posterior lean and ayaka knees flexed  noted requiring continious verbal and tactile cues for locking ayaka knees, P return. CGA/MOD A for safety. no LOB observed         Additional Activities  Additional Activities Comment: AM: Writer introduced pt and pt spouse on recommended AE (reacher, LHS, and sock aid) to increased ease and indep in self care tasks. Writer provided verbal instruction and demo on proper use of AE. Pt reporting agreeable to trialing items during ADLs next tx session    Assessment  Assessment  Activity Tolerance: Patient limited by fatigue  Discharge Recommendations: 24 hour supervision or assist;Home with Home health OT    Patient Education  Education  Education Given To: Patient; Family  Education Provided: Role of Therapy;Plan of Care;Precautions; ADL Function;Transfer Training;Cognition  Education Method: Verbal;Demonstration  Barriers to Learning: Cognition  Education Outcome: Continued education needed    OT Equipment Recommendations  Equipment Needed: Yes  Mobility Devices: ADL Assistive Devices  ADL Assistive Devices: Sock-Aid Hard;Reacher;Long-handled Sponge;Long-handled Shoe Horn  Other: Pt and pt spouse reports having walk in shower, shower bench, and grab bars in shower and around commode at private residence            Goals  Patient Goals   Patient goals : \"I would like to be able to get up and go so I can go outside and do some repairs and go downstairs and reload some guerrero. Do the things I normally do. \"  Short Term Goals  Time Frame for Short Term Goals: By 1 week  Short Term Goal 1: Pt will complete upper body dressing/bathing with Min A and Good attention/safety while maintaining back precautions  Short Term Goal 2: Pt will complete lower body dressing/bathing with Mod A and Good safety with use of AE as needed while maintaining back precautions  Short Term Goal 3: Pt will complete funcitonal transfers/mobility during self care tasks with Min A and Good safety with use of least restrictive device  Short Term Goal 4: Pt will tolerate standing 4+ minutes during self care tasks with Min A and Good safety  Short Term Goal 5: Pt will verbalize/demonstrate understanding of back precautions during daily activities 80% accuracy  Short Term Goal 6: Pt will participate in 30+ minutes during therapeutic exercises/functional activities to increase safety and independence with self care and mobility    Long Term Goals  Time Frame for Long Term Goals : By discharge  Long Term Goal 1: Pt will complete BADLs with SBA and Good attention/safety to task while maintaining back precautions  Long Term Goal 2: Pt will complete functional transfers/mobility during self care taks with SBA and Good safety with use of least restrictive device  Long Term Goal 3: Pt will tolerate standing 8+ minutes during self care tasks with SBA and Good safety  Long Term Goal 4: Pt will demonstrate increased [facility] and strength during self care tasks as evident by 5# improvement in  strength and 15 second improvement on 9 hole peg test.  Long Term Goal 5: Pt will demonstrate sitting EOB 20+ minutes during self care tasks while maintaining appropriate midline with SBA  Long Term Goal 6: Pt/family will verbalize/demonstrate of home safety/fall prevention strategies to increase safety and independence with self care and mobility    Plan  Occupational Therapy Plan  Times Per Week: 5-7  Times Per Day: Twice a day  Current Treatment Recommendations: Self-Care / ADL, Strengthening, ROM, Balance training, Functional mobility training, Endurance training, Cognitive reorientation, Pain management, Safety education & training, Patient/Caregiver education & training, Equipment evaluation, education, & procurement, Home management training, Coordination training, Cognitive/Perceptual training       12/19/22 1001 12/19/22 1404   OT Individual Minutes   Time In [id number]   Time Out [id number]   [id number] Electronically signed by ILDA Marinelli on 12/19/22 at 3:26 PM EST

## 2022-12-19 NOTE — PATIENT CARE CONFERENCE
[facility] Acute Inpatient Rehabilitation  TEAM CONFERENCE NOTE  Date: 22  Patient Name: Kristofer Burleson       Room: 4547/4073-40  MRN: [id number]       : 1955  (78 y.o.)     Gender: male   Referring Practitioner: Dr. Tano Page   Unspecified cord compression [G95.20]  Cord compression [facility]) [G95.20]  Diagnosis: Cord compression s/p surgical decompression/fusion L1-4     NURSING    Bladder Continence  Always Continent  Bowel Continence Always Continent    Date of Last BM: 22    Bladder/Bowel Program Interventions: Both Bowel & Bladder Program In Place     Wounds/Incisions/Ulcers: Incision healing well to back mid lower- staples     Pain Control: Patient's pain currently controlled and pain regimen effective as ordered    Pain Medication Regimen Usage Pattern: MAR reviewed and pain medications are being used at the following frequency (Specify Medication, # Doses Administered on average per day, identified patterns of use - for example: time of day, prior to activity/therapy)  Tylenol 650 mg tablet every 4 hours PRN  Percicet q6H -hasn't taken since admission     Fall Risk:  Falling star program initiated    Medication Education Program: Patient currently unable to manage medications and responsible party being educated    Discharge Preparation Patient/Responsible Party Education In Progress:   No Educational Needs Identified    Nursing specific communication for TEAM: No additional information identified requiring communication at this time    PHYSICAL THERAPY    Bed Mobility:  Additional Factors: Head of bed raised; With handrails  Roll Left  Assistance Level: Stand by assist  Sit to Supine  Assistance Level: mod A  Supine to Sit  Assistance Level: Stand by assist/min A  Scooting  Assistance Level:  Moderate assistance  Skilled Clinical Factors: to EOB      Transfers:  Sit to Stand: Minimal Assistance  Stand to Sit: Minimal Assistance  Bed to Chair: Minimal assistance    Ambulation  Surface: Level tile; Ramp  Device: Rolling Walker  Other Apparatus: Wheelchair follow (spouse assisting with follow)  Assistance: Minimal assistance  Quality of Gait: heavy flexed at knees; difficulty with turns  Gait Deviations: Decreased step height;Decreased step length;Decreased head and trunk rotation; Slow Katey; Shuffles (on RLE)  Distance: 50 feet x 4; ramp 30 feet  Comments: worked on transfer and ambulation at bedside for technique safety from varied surfaces and surfaces height. spouse present and discussed mobility for couch height. More Ambulation?: Yes  Ambulation 2  Surface - 2: level tile  Device 2: Lau rail  Assistance 2: Minimal assistance  Quality of Gait 2: forward trunk, very NBOS, unsteady, intermittent scissor gait (especially during turning), Flatfoot initial contact bilaterally, and difficulty with turns  Gait Deviations: Decreased step length;Decreased step height;Decreased head and trunk rotation; Shuffles; Slow Katey  Distance: 40 feet  Comments: Maximal verbal, visual, tactile cues for posture, step width, straightening knees, and maintaining forward gaze. [place] all of which the patient has difficulty maintaining for > 5 seconds when focusing to correct deviations. Ambulation:  # Steps : 6  Stairs Height: 8\"  Rails: Right ascending  Assistance: Moderate assistance              Wheelchair  Surface: Level surface  Device: Standard wheelchair  Additional Factors: Verbal cues, Increased time to complete  Assistance Required to Manage Parts: All wheelchair parts  Assistance Level for Propulsion: Moderate assistance  Propulsion Method: Bilateral lower extremities, Bilateral upper extremities  Propulsion Quality: Decreased fluidity  Propulsion Distance: 220ft  Skilled Clinical Factors: Pt requires assistance to clear obsticles.       Goals  Time Frame for Short Term Goals: 7 days  Short Term Goal 1: pt to demo all Bed mobility with Rajni on flattened bed  Short Term Goal 2: pt to perform transfers with RW and Rajni  Short Term Goal 3: pt to ambulate 150' with RW and CGAx1  Short Term Goal 4: pt to negotiate single platform step + 2 successive steps (No rails) with Rajni x1 to allow for safe home access  Short Term Goal 5: pt to verbally identify 3/3 spinal precautions to maximize safety and functional outcomes. OCCUPATIONAL THERAPY  SELF CARE          Feeding  Assistance Level: Modified independent  Skilled Clinical Factors: per spouse and pt report          Grooming/Oral Hygiene  Assistance Level: Stand by assist  Skilled Clinical Factors: seated at sink to wash face and brush teeth. Pt able to use R hand for holding toothbrush with no droppage noted. Upper Extremity Bathing  Assistance Level: Set-up  Skilled Clinical Factors: Completes while semi supine in bed, VC for initiation. Lower Extremity Bathing  Assistance Level: Contact guard assist  Skilled Clinical Factors: Pt washes buttock/bolivar area while standing at sink with CGA. Setup to apply soap to washcloth, VC for initation of each step. Upper Extremity Dressing  Assistance Level: Maximum assistance  Skilled Clinical Factors: Completed while semi supine in bed, Max A for shirt and lumbar corset. Spouse present for assist         Lower Extremity Dressing  Assistance Level: Maximum assistance  Skilled Clinical Factors: A with threading pants to avoid excess bending, Pt able to manage up/down over hips while standing at the sink with CGA and VC for straightening B knees. Putting On/Taking Off Footwear  Assistance Level: Maximum assistance  Skilled Clinical Factors: Able to slighty achieve figure four tech to initiate donning B socks, requires A for pulling on completly. Toileting  Assistance Level: Minimal assistance  Skilled Clinical Factors: A pulling pants down while standing at GB, Pt able to bend to pull back up over hips with CGA for safety.         Toilet transfers: Minimal assistance    Short Term Goals  Time Frame for Short Term Goals: By 1 week  Short Term Goal 1: Pt will complete upper body dressing/bathing with Min A and Good attention/safety while maintaining back precautions  Short Term Goal 2: Pt will complete lower body dressing/bathing with Mod A and Good safety with use of AE as needed while maintaining back precautions  Short Term Goal 3: Pt will complete funcitonal transfers/mobility during self care tasks with Min A and Good safety with use of least restrictive device  Short Term Goal 4: Pt will tolerate standing 4+ minutes during self care tasks with Min A and Good safety  Short Term Goal 5: Pt will verbalize/demonstrate understanding of back precautions during daily activities 80% accuracy  Short Term Goal 6: Pt will participate in 30+ minutes during therapeutic exercises/functional activities to increase safety and independence with self care and mobility     SPEECH THERAPY  Mod A expression and problem solving, max A memory  Short Term Goal: min A expression and problem solving, mod A memory    NUTRITION  Weight: 231 lb 4.8 oz (104.9 kg) / Body mass index is 31.37 kg/m². Diet Rx: Regular with Glucerna twice daily. PO intake has improved and appears adequate with % of meals and supplements consumed. Please see nutrition note for details.     CASE MANAGEMENT ASSESSMENT    Discharge Disposition: Home  Family Support: wife is retired and can provide 24/7    PCP Established: [x] Yes [] No (If No: Specify Status of Designation)    Services Being Followed In Preparation For Discharge: (Check All That Apply)  [] [facility] ([address] [place])  [x] Outpatient Therapy Location to be determined   [] Dialysis   [] Hemodialysis (Specify Location/Days/Chair Times)   [] Peritoneal Dialysis  [] IV Infusion Services  [] Enteral Nutrition Services  [] Oxygen  [] Stoma/Ostomy  [] Catheter  [] Lovenox  [x] Coumadin     Patient Mood Interview PHQ-2 to 9 Score: 0    Pre-Admission Status:  Lives With: Spouse (Danay)  Type of Home: House  Home Layout: One level, Laundry in basement [employer] does laundry. Per Ayo Flatter he will access basement to use the computer)  Home Access: Stairs to enter without rails  Entrance Stairs - Number of Steps: 1 platform step + 2 Steps with no Railings to enter; To basement level has stairs with landing and Bilateral Railings (\"Steps are curved\" per spouse)  Bathroom Shower/Tub: Walk-in shower, Doors  Bathroom Toilet: Standard  Bathroom Equipment: Hand-held shower, Grab bars in shower, Built-in shower seat, Grab bars around toilet  Bathroom Accessibility:  (Not accessible by 6TX)  Home Equipment: Valentino Sings, 4 wheeled, Deborah Umm, quad, [employer], [place], KOGL bars, Walker, standard (brakes do not work on Deejay)  Has the patient had two or more falls in the past year or any fall with injury in the past year?: Yes (\"too many to guess\" pt reports most falls occurring at home while \"trying to maneuver around [employer] Help From: Family, Neighbor  ADL Assistance: Needs assistance  Homemaking Assistance: Needs assistance (wife as primary)  Homemaking Responsibilities: No  Ambulation Assistance: Needs assistance (9RL; per Wife pt often times abandons walker. 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 Pt appears to be somewhat of a poor historian this date. Ayo Flatter arrives to help verify information.      Family Education: Family Education initiated and Ongoing    Percentage Risk for Readmission: Low 0 - 18%   Readmission Risk              Risk of Unplanned Readmission:  7       %    Critical Items: None       Problem / Barrier Intervention / Plan  Results   Impaired mobility 2* B LE weakness and Hx of parkinson's  Strengthening, balance activities, functional mobility training. dysarthria Compensatory strategies    Impaired cognition Cognitive retraining exercises    Altered ability to care for self Remediation and training in modified care strategies to increase safety and independence with self care tasks                     Total Self Care Score    Total Mobility Score  Admission Score:  15      Admission Score:  25  Goal:  30/42         Goal:  68/90    THERAPY MINUTE COMPLIANCE  Patient is participating and compliant with meeting therapy minutes as outlined in plan of care: Yes `    Discharge Plan   Estimated Discharge Date: 12/30/22  Home evaluation needed?  No  Overnight or Day Pass: No  Factors facilitating achievement of predicted outcomes: Family support  Barriers to the achievement of predicted outcomes: Pain, Anxiety, Decreased endurance, Lower extremity weakness, Long standing deficits, Medical complications, Skin Care, and Medication managment    Functional Goals at discharge:  Predicted Outcome: Home with familyPATIENT'S LEVEL OF ASSISTANCE: Stand By Assistance   Discharge therapy goals:  PT: Long Term Goals  Time Frame for Long Term Goals : Until D/C  Long Term Goal 1: pt to demo all Bed mobility on flattened bed with supervision  Long Term Goal 2: pt to perform transfers with RW and supervision  Long Term Goal 3: pt to ambulate 150' with RW and SBAx1  Long Term Goal 4: pt to negotiate single platform step + 2 successive steps (No rails) with SBA to allow for safe home access  Long Term Goal 5: pt to improve BLE strength by 1/2 MMG to improve safety with mobility  Additional Goals?: Yes  Long term goal 6: pt to improve Sitting and standing balance to FAIR or better to decrease risk for falls during mobility  Long term goal 7: pt to demo simulated or actual car transfer with SBA  Long term goal 8: pt to negotiate FF of steps with Bilateral Rail and SBA to allow for safe access to basement level of home. OT:Long Term Goals  Time Frame for Long Term Goals : By discharge  Long Term Goal 1: Pt will complete BADLs with SBA and Good attention/safety to task while maintaining back precautions  Long Term Goal 2: Pt will complete functional transfers/mobility during self care taks with SBA and Good safety with use of least restrictive device  Long Term Goal 3: Pt will tolerate standing 8+ minutes during self care tasks with SBA and Good safety  Long Term Goal 4: Pt will demonstrate increased [facility] and strength during self care tasks as evident by 5# improvement in  strength and 15 second improvement on 9 hole peg test.  Long Term Goal 5: Pt will demonstrate sitting EOB 20+ minutes during self care tasks while maintaining appropriate midline with SBA  Long Term Goal 6: Pt/family will verbalize/demonstrate of home safety/fall prevention strategies to increase safety and independence with self care and mobility  ST: supervision level expression, problem solving, min A memory    Participating Team Members:  /:  Jessica Delgado RN  Occupational Therapist:  Lisandra Burrows OT   Physical Therapist: Veena  PT  Speech Therapist:  Mayda Maxwell [place] [id number] [facility]  Nurse: Arelis Bradford RN    Dietary/Nutrition: Kayla Hernandez RD, LD  Pastoral Care: Chaplain Ja Huff  CMG: Mauricio Bosch RN    I approve the established interdisciplinary plan of care as documented within the medical record of Francisco Isabel. Kimberly Martin.  Della Rockwell MD

## 2022-12-19 NOTE — CONSULTS
[facility] Internal Medicine  Haley Lewis MD; Jerod Tee MD; Yobany Calero MD; MD Josy Sahu MD; MD LOLY Portillo [facility] Internal Medicine   [facility]    HISTORY AND PHYSICAL EXAMINATION            Date:   12/19/2022  Patient name:  Kamille Garcia  Date of admission:  12/15/2022  5:57 PM  MRN:   [id number]  Account:  [de-identified]  YOB: 1955  PCP:    Francisco Javier Finney MD  Room:   [facility]2583-  Code Status:    Full Code    Chief Complaint:       Back pain from spinal stenosis s/p laminectomy    History Obtained From:     Patient and electronic medical record    History of Present Illness:     Kamille Garcia is a 79 y.o. Unavailable / unknown male who has past medical history of a . Fib and parkinson presents with back pain from spinal cord compression s/p laminectomy. He is admitted for acute rehabilitation. Laminectomy done at [facility] 1 week back. Currently patient denied back pain. He dose have weakness in bilateral lower extremity 4/5.      Past Medical History:     Past Medical History:   Diagnosis Date    Anxiety     Asthma     Atrial fibrillation (HCC)     Back pain     BPH (benign prostatic hyperplasia)     Dental disease     Depression     Diarrhea     MALONE (dyspnea on exertion)     Frequent falls     GERD (gastroesophageal reflux disease)     Memory loss     Obesity     Panic disorder     Parkinson's disease ([address]     Testicular cancer ([address]     Visual impairment         Past Surgical History:     Past Surgical History:   Procedure Laterality Date    BRAIN SURGERY      Neurostimulator implant for Parkinson's    BROW LIFT      CARPAL TUNNEL RELEASE Right     CHOLECYSTECTOMY      COLONOSCOPY      CYSTOSCOPY      HERNIA REPAIR      LUMBAR LAMINECTOMY      LYMPH NODE DISSECTION      ORCHIECTOMY      SHOULDER ARTHROSCOPY Right     TOTAL KNEE ARTHROPLASTY Bilateral     TURP      WRIST SURGERY Left     cyst removal        Medications Prior to Admission:     Prior to Admission medications    Not on File        Allergies:     Patient has no known allergies. Social History:     Tobacco:    reports that he has never smoked. He has never used smokeless tobacco.  Alcohol:      has no history on file for alcohol use. Drug Use:  has no history on file for drug use. Family History:     Family History   Problem Relation Age of Onset    Other Mother     Heart Failure Father     Other Sister     Behavior Problems Sister     No Known Problems Brother        Review of Systems:     Review of Systems   Constitutional: Negative. HENT: Negative. Eyes: Negative. Respiratory: Negative. Cardiovascular: Negative. Gastrointestinal: Negative. Endocrine: Negative. Genitourinary: Negative. Musculoskeletal: Negative. Bilateral lower extremity weakness. Skin: Negative. Allergic/Immunologic: Negative. Neurological: Negative. Hematological: Negative. Psychiatric/Behavioral: Negative. Vearl Tys Physical Exam:     Physical Exam   Vitals:    Vitals:    22 0908 22 1653 22 1930 22 0515   BP:  (!) 138/91 125/86 130/79   Pulse:  73 65 69   Resp:    Temp:  98.6 °F (37 °C) 97.5 °F (36.4 °C) 98.8 °F (37.1 °C)   TempSrc:  Oral Oral Oral   SpO2:  95% 96%    Weight:       Height:                        Body mass index is 31.37 kg/m². Temp (24hrs), Av.3 °F (36.8 °C), Min:97.5 °F (36.4 °C), Max:98.8 °F (37.1 °C)    No results for input(s): POCGLU in the last 72 hours. General Appearance:   well apearing             Skin:                             No rash or erythema  HEENT ;                                                                       No icterus, no pallor . No ptosis.     No gross asymmetry  Or abnormality face         Neck:                            No mass , no thyroid enlargement Pulmonary/Chest:                                               Symmetric                                          Clear to auscultation bilaterally . No wheezes,                                          No rales or rhonchi . No abnormality on percussion                                                        Cardiovascular:            Normal rate, regular rhythm,                                          No murmur or  Gallop . Abdomen:                       Soft, non-tender                                           Normal bowels sounds,                                             Extremities:                    normal sensation.  Power 4/5 in bilateral lower extremities                                           Musculo-skeletal / Neurological ;;                                                                                               Investigations:      URINE ANALYSIS: No results found for: LABURIN     CBC:  Lab Results   Component Value Date/Time    WBC 7.0 12/16/2022 06:21 AM    HGB 12.5 12/16/2022 06:21 AM     12/16/2022 06:21 AM             BMP:    Lab Results   Component Value Date/Time     12/16/2022 06:21 AM    K 3.9 12/16/2022 06:21 AM     12/16/2022 06:21 AM    CO2 25 12/16/2022 06:21 AM    BUN 23 12/16/2022 06:21 AM    CREATININE 0.80 12/16/2022 06:21 AM    GLUCOSE 95 12/16/2022 06:21 AM      LIVER PROFILE:  Lab Results   Component Value Date/Time    ALT 44 12/16/2022 06:21 AM    AST 35 12/16/2022 06:21 AM    PROT 7.0 12/16/2022 06:21 AM    BILITOT 0.7 12/16/2022 06:21 AM    BILIDIR 0.2 12/16/2022 06:21 AM    LABALBU 4.0 12/16/2022 06:21 AM             @BRIEFLABT(TSH)@      Laboratory Testing:  Recent Results (from the past 24 hour(s))   Protime-INR    Collection Time: 12/19/22  6:33 AM   Result Value Ref Range    Protime 25.8 (H) 11.8 - 14.6 sec INR 2.4        Imaging/Diagnostics:  No results found. Assessment :      Hospital Problems             Last Modified POA    * (Principal) Cord compression [address] 12/15/2022 Yes    Moderate malnutrition ([facility] 12/16/2022 Yes   Corby Mao is a 79 y.o. Unavailable / unknown male who has past medical history of a . Fib and parkinson presents with back pain from spinal cord compression s/p laminectomy. He is admitted for acute rehabilitation. Laminectomy done at [facility] 1 week back. Plan:       Medications: Allergies:  No Known Allergies    Current Meds:   Scheduled Meds:    warfarin  5 mg Oral Once    senna  2 tablet Oral Nightly    polyethylene glycol  17 g Oral BID    Opicapone  50 mg Oral Nightly    mirabegron  50 mg Oral Nightly    carbidopa-levodopa  1 tablet Oral BID    carbidopa-levodopa  1 tablet Oral BID    escitalopram  20 mg Oral Daily    rasagiline mesylate  1 mg Oral Daily    warfarin placeholder: dosing by pharmacy   Other RX Placeholder     Continuous Infusions:   PRN Meds: oxyCODONE-acetaminophen, diphenhydrAMINE-zinc acetate, tiZANidine, acetaminophen, bisacodyl          12/19/2022    Admitted for acute rehab after laminectomy for spinal stenosis   Hemodynamically stable, saturating well in room air. Continue physical therapy and occupation therapy. 12/19/22    Patient tolerating rehabilitative therapies . Progressing fairly well . Continue present therapy . Merle Presluke, MD LOLY [address], [address] [place] [address]. Phone [phone]   Fax: [phone]  Answering Service: [phone]            12/19/2022  2:19 PM    Copy sent to Dr. Arnold Betancourt MD    Please note that this chart was generated using voice recognition Dragon dictation software. Although every effort was made to ensure the accuracy of this automated transcription, some errors in transcription may have occurred.

## 2022-12-20 ENCOUNTER — APPOINTMENT (OUTPATIENT)
Dept: GENERAL RADIOLOGY | Age: 67
End: 2022-12-20
Attending: PHYSICAL MEDICINE & REHABILITATION
Payer: MEDICARE

## 2022-12-20 LAB
INR BLD: 2.4
PROTHROMBIN TIME: 26.1 SEC (ref 11.8–14.6)

## 2022-12-20 PROCEDURE — 97129 THER IVNTJ 1ST 15 MIN: CPT

## 2022-12-20 PROCEDURE — 97110 THERAPEUTIC EXERCISES: CPT

## 2022-12-20 PROCEDURE — 97116 GAIT TRAINING THERAPY: CPT

## 2022-12-20 PROCEDURE — 99232 SBSQ HOSP IP/OBS MODERATE 35: CPT | Performed by: PHYSICAL MEDICINE & REHABILITATION

## 2022-12-20 PROCEDURE — 99232 SBSQ HOSP IP/OBS MODERATE 35: CPT | Performed by: INTERNAL MEDICINE

## 2022-12-20 PROCEDURE — 97535 SELF CARE MNGMENT TRAINING: CPT

## 2022-12-20 PROCEDURE — 36415 COLL VENOUS BLD VENIPUNCTURE: CPT

## 2022-12-20 PROCEDURE — 97530 THERAPEUTIC ACTIVITIES: CPT

## 2022-12-20 PROCEDURE — 85610 PROTHROMBIN TIME: CPT

## 2022-12-20 PROCEDURE — 6370000000 HC RX 637 (ALT 250 FOR IP): Performed by: PHYSICAL MEDICINE & REHABILITATION

## 2022-12-20 PROCEDURE — 74018 RADEX ABDOMEN 1 VIEW: CPT

## 2022-12-20 PROCEDURE — 92507 TX SP LANG VOICE COMM INDIV: CPT

## 2022-12-20 PROCEDURE — 1180000000 HC REHAB R&B

## 2022-12-20 PROCEDURE — 6370000000 HC RX 637 (ALT 250 FOR IP): Performed by: STUDENT IN AN ORGANIZED HEALTH CARE EDUCATION/TRAINING PROGRAM

## 2022-12-20 PROCEDURE — 93005 ELECTROCARDIOGRAM TRACING: CPT | Performed by: PHYSICAL MEDICINE & REHABILITATION

## 2022-12-20 RX ORDER — WARFARIN SODIUM 5 MG/1
5 TABLET ORAL
Status: COMPLETED | OUTPATIENT
Start: 2022-12-20 | End: 2022-12-20

## 2022-12-20 RX ADMIN — CARBIDOPA AND LEVODOPA 1 TABLET: 25; 100 TABLET, EXTENDED RELEASE ORAL at 08:33

## 2022-12-20 RX ADMIN — WARFARIN SODIUM 5 MG: 5 TABLET ORAL at 18:37

## 2022-12-20 RX ADMIN — SENNOSIDES 17.2 MG: 8.6 TABLET, FILM COATED ORAL at 21:58

## 2022-12-20 RX ADMIN — POLYETHYLENE GLYCOL 3350 17 G: 17 POWDER, FOR SOLUTION ORAL at 08:33

## 2022-12-20 RX ADMIN — RASAGILINE 1 MG: 1 TABLET ORAL at 08:33

## 2022-12-20 RX ADMIN — CARBIDOPA AND LEVODOPA 1 TABLET: 25; 100 TABLET ORAL at 22:00

## 2022-12-20 RX ADMIN — CARBIDOPA AND LEVODOPA 1 TABLET: 25; 100 TABLET, EXTENDED RELEASE ORAL at 21:59

## 2022-12-20 RX ADMIN — ESCITALOPRAM OXALATE 20 MG: 10 TABLET ORAL at 08:33

## 2022-12-20 RX ADMIN — CARBIDOPA AND LEVODOPA 1 TABLET: 25; 100 TABLET ORAL at 08:33

## 2022-12-20 ASSESSMENT — PAIN DESCRIPTION - ORIENTATION: ORIENTATION: LOWER;MID

## 2022-12-20 ASSESSMENT — PAIN DESCRIPTION - PAIN TYPE: TYPE: ACUTE PAIN;SURGICAL PAIN

## 2022-12-20 ASSESSMENT — PAIN DESCRIPTION - LOCATION: LOCATION: BACK

## 2022-12-20 ASSESSMENT — PAIN SCALES - GENERAL: PAINLEVEL_OUTOF10: 5

## 2022-12-20 NOTE — CARE COORDINATION
ONGOING ARU DISCHARGE PLAN:    Patient is alert and oriented x4. Spoke with patient regarding discharge plan and patient confirms plan home with spouse and OutPt therapy. Wife to be scheduled for family training today     DME:    Will continue to follow for additional discharge needs.     Electronically signed by Marck Naranjo RN on 12/20/2022 at 1:27 PM

## 2022-12-20 NOTE — PROGRESS NOTES
[address]    Acute Rehabilitation Occupational Therapy Daily Treatment Note    Date: 22  Patient Name: Alejandro Lucero       Room: 7695/9672-17  MRN: [id number]  Account: [de-identified]   : 1955  (78 y.o.) Gender: male       Referring Practitioner: Carolyn Tucker MD  Diagnosis: Cord compression s/p surgical decompression/fusion L1-4  Additional Pertinent Hx: Patient with spinal cord compression who had L1-4 laminectomy and fusion performed by Dr. Georgette Lam formarked L1-2 stenosis and degenerative spondylolisthesis at L3-4 on 22. Medical records indicate some post-op confusion. Pt admitted to ARU on 12/15/22. Treatment Diagnosis: Impaired self care status    Past Medical History:  has a past medical history of Anxiety, Asthma, Atrial fibrillation ([address] Back pain, BPH (benign prostatic hyperplasia), Dental disease, Depression, Diarrhea, MALONE (dyspnea on exertion), Frequent falls, GERD (gastroesophageal reflux disease), Memory loss, Obesity, Panic disorder, Parkinson's disease ([address] Testicular cancer ([address] and Visual impairment. Past Surgical History:   has a past surgical history that includes Cholecystectomy; hernia repair; Total knee arthroplasty (Bilateral); lumbar laminectomy; Orchiectomy; brow lift; Carpal tunnel release (Right); Shoulder arthroscopy (Right); Wrist surgery (Left); Colonoscopy; Cystoscopy; brain surgery; TURP; and lymph node dissection. Restrictions  Restrictions/Precautions  Restrictions/Precautions: Surgical Protocols, Fall Risk, General Precautions, Up as Tolerated  Required Braces or Orthoses?: Yes  Implants present? : Metal implants, Deep brain stimulator  Required Braces or Orthoses  Spinal: Lumbar Corset  Position Activity Restriction  Spinal Precautions: Avoid Excessive Bending, Lifting, and Twisting of spine  Other position/activity restrictions: LSO to be donned when OOB; [id number] [address] per Dr. Misty Dalton to shower POD #7 (Sx date: 22).  PT/OT therapy staff may titrate oxygen down as tolerated in therapy, but may only titrate up to maximum 4 L NC as needed in therapy. Nursing should perform oxygen titration > 4 L. Vitals  Vital Signs  O2 Device: None (Room air)     Subjective  Subjective  Subjective: \"My back\" pt states in regards to pain  Pain Assessment  Pain Assessment: 0-10  Pain Level: 5  Pain Location: Back  Pain Orientation: Lower;Mid  Pain Type: Acute pain;Surgical pain    Objective  Cognition  Overall Orientation Status: Within Functional Limits  Orientation Level: Oriented X4    Cognition  Overall Cognitive Status: Exceptions  Arousal/Alertness: Delayed responses to stimuli  Following Commands: Follows one step commands with increased time; Follows one step commands with repetition  Attention Span: Difficulty attending to directions  Memory: Decreased short term memory;Decreased recall of precautions;Decreased recall of recent events  Safety Judgement: Decreased awareness of need for assistance;Decreased awareness of need for safety  Problem Solving: Assistance required to identify errors made;Assistance required to correct errors made;Assistance required to implement solutions;Assistance required to generate solutions;Decreased awareness of errors  Insights: Decreased awareness of deficits  Initiation: Requires cues for some  Sequencing: Requires cues for some    Activities of Daily Living  Feeding  Assistance Level: Modified independent  Skilled Clinical Factors: per spouse and pt report    Grooming/Oral Hygiene  Assistance Level: Stand by assist  Skilled Clinical Factors: seated at sink to wash face and brush teeth. Pt able to use R hand for holding toothbrush with no droppage noted.     Upper Extremity Bathing  Assistance Level: Stand by assist  Skilled Clinical Factors: seated EOB; cuing for initiation and for posture correction as pt tends to displyaed retro and L lateral lean    Lower Extremity Bathing  Equipment Provided: Long-handled sponge  Assistance Level: Contact guard assist  Skilled Clinical Factors: Pt washes buttock/bolivar area while standing at sink with CGA. LHS for BLEs and footlevel care to avoid excessive bending    Upper Extremity Dressing  Assistance Level: Moderate assistance  Skilled Clinical Factors: Seated EOB to don [place] shirt with verbal cues and demo in doffing shirt, A to don LSO brace    Lower Extremity Dressing  Equipment Provided: Reachers  Assistance Level: Moderate assistance  Skilled Clinical Factors: intermittent A using reaching with threading pants to avoid excess bending, Pt able to manage up/down over hips while standing at the sink with CGA and VC for straightening B knees. Putting On/Taking Off Footwear  Assistance Level: Maximum assistance  Skilled Clinical Factors: TEDs and slip on shoes         Mobility              Scooting  Assistance Level: Contact guard assist  Skilled Clinical Factors: hips to EOB       Sit to Stand  Assistance Level: Minimal assistance  Skilled Clinical Factors: Max tactile cues for hand placement  Stand to Sit  Assistance Level: Minimal assistance  Skilled Clinical Factors: Max tactile cues for hand placement. Intermittent Min A for posterior lean. Functional Mobility  Device: Rolling walker  Activity: To/From bathroom  Assistance Level: Minimal assistance  Skilled Clinical Factors: VC for upright posture and wide CLARA during functional mobility. Intermittent Min A for posterior lean. no LOB. OT Exercises  Resistive Exercises: Retrieval and placement of various resistive clothes pins to address hand strength in order to participate in self care tasks indep.  Pt able to complete task with G tolerance and no RB  Dynamic Sitting Balance Exercises: AM: seated EOB for UE dressing and bathing, Pt displays posterior and L lateral leans req moderate vc for posture correction; sitting ~ 20-21 mins  Motor Control/Coordination: PM: Pt instruction in [facility] task to place small pegs into small peg board alternating UE throughout. Pt dropping pegs 25% of actiivty. Increased time req for completion         Assessment  Assessment  Activity Tolerance: Patient limited by fatigue  Discharge Recommendations: 24 hour supervision or assist;Home with Home health OT    Patient Education  Education  Education Given To: Patient; Family  Education Provided: Role of Therapy;Plan of Care;Precautions; ADL Function;Transfer Training;Cognition  Education Method: Verbal;Demonstration  Barriers to Learning: Cognition  Education Outcome: Continued education needed    OT Equipment Recommendations  Equipment Needed: Yes  Mobility Devices: ADL Assistive Devices  ADL Assistive Devices: Sock-Aid Hard;Reacher;Long-handled Sponge;Long-handled Shoe Horn  Other: Pt and pt spouse reports having walk in shower, shower bench, and grab bars in shower and around commode at private residence            Goals  Patient Goals   Patient goals : \"I would like to be able to get up and go so I can go outside and do some repairs and go downstairs and reload some guerrero. Do the things I normally do. \"  Short Term Goals  Time Frame for Short Term Goals: By 1 week  Short Term Goal 1: Pt will complete upper body dressing/bathing with Min A and Good attention/safety while maintaining back precautions  Short Term Goal 2: Pt will complete lower body dressing/bathing with Mod A and Good safety with use of AE as needed while maintaining back precautions  Short Term Goal 3: Pt will complete funcitonal transfers/mobility during self care tasks with Min A and Good safety with use of least restrictive device  Short Term Goal 4: Pt will tolerate standing 4+ minutes during self care tasks with Min A and Good safety  Short Term Goal 5: Pt will verbalize/demonstrate understanding of back precautions during daily activities 80% accuracy  Short Term Goal 6: Pt will participate in 30+ minutes during therapeutic exercises/functional activities to increase safety and independence with self care and mobility    Long Term Goals  Time Frame for Long Term Goals : By discharge  Long Term Goal 1: Pt will complete BADLs with SBA and Good attention/safety to task while maintaining back precautions  Long Term Goal 2: Pt will complete functional transfers/mobility during self care taks with SBA and Good safety with use of least restrictive device  Long Term Goal 3: Pt will tolerate standing 8+ minutes during self care tasks with SBA and Good safety  Long Term Goal 4: Pt will demonstrate increased [facility] and strength during self care tasks as evident by 5# improvement in  strength and 15 second improvement on 9 hole peg test.  Long Term Goal 5: Pt will demonstrate sitting EOB 20+ minutes during self care tasks while maintaining appropriate midline with SBA  Long Term Goal 6: Pt/family will verbalize/demonstrate of home safety/fall prevention strategies to increase safety and independence with self care and mobility    Plan  Occupational Therapy Plan  Times Per Week: 5-7  Times Per Day: Twice a day  Current Treatment Recommendations: Self-Care / ADL, Strengthening, ROM, Balance training, Functional mobility training, Endurance training, Cognitive reorientation, Pain management, Safety education & training, Patient/Caregiver education & training, Equipment evaluation, education, & procurement, Home management training, Coordination training, Cognitive/Perceptual training       12/20/22 1107 12/20/22 1432   OT Individual Minutes   Time In [id number]   Time Out [id number]   [id number]       Electronically signed by ILDA Ramirez on 12/20/22 at 3:58 PM EST

## 2022-12-20 NOTE — PLAN OF CARE
Problem: Discharge Planning  Goal: Discharge to home or other facility with appropriate resources  12/20/2022 0649 by Leon Schneider RN  Outcome: Progressing     Problem: Skin/Tissue Integrity  Goal: Absence of new skin breakdown  Description: 1. Monitor for areas of redness and/or skin breakdown  2. Assess vascular access sites hourly  3. Every 4-6 hours minimum:  Change oxygen saturation probe site  4. Every 4-6 hours:  If on nasal continuous positive airway pressure, respiratory therapy assess nares and determine need for appliance change or resting period. 12/20/2022 0649 by Leon Schneider RN  Outcome: Progressing     Problem: Safety - Adult  Goal: Free from fall injury  12/20/2022 0649 by Leon Schneider RN  Outcome: Progressing     Problem: Pain  Goal: Verbalizes/displays adequate comfort level or baseline comfort level  12/20/2022 0649 by Leon Schneider RN  Outcome: Progressing     Problem: Confusion  Goal: Confusion, delirium, dementia, or psychosis is improved or at baseline  Description: INTERVENTIONS:  1. Assess for possible contributors to thought disturbance, including medications, impaired vision or hearing, underlying metabolic abnormalities, dehydration, psychiatric diagnoses, and notify attending LIP  2. [place] high risk fall precautions, as indicated  3. Provide frequent short contacts to provide reality reorientation, refocusing and direction  4. Decrease environmental stimuli, including noise as appropriate  5. Monitor and intervene to maintain adequate nutrition, hydration, elimination, sleep and activity  6. If unable to ensure safety without constant attention obtain sitter and review sitter guidelines with assigned personnel  7.  Initiate Psychosocial CNS and Spiritual Care consult, as indicated  12/20/2022 1472 by Leon Schneider RN  Outcome: Progressing  Flowsheets (Taken 12/19/2022 2014)  Effect of thought disturbance (confusion, delirium, dementia, or psychosis) are managed with adequate functional status:   [place] high risk fall precautions, as indicated   Provide frequent short contacts to provide reality reorientation, refocusing and direction   Decrease environmental stimuli, including noise as appropriate     Problem: ABCDS Injury Assessment  Goal: Absence of physical injury  12/20/2022 0649 by Linnette Corrigan RN  Outcome: Progressing
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
## 2022-12-20 NOTE — PROGRESS NOTES
Physical Therapy  Facility/Department: [facility]  Rehabilitation Physical Therapy   NAME: Terry Mclain  : 1955 (79 y.o.)  MRN: [id number]  CODE STATUS: Full Code    Date of Service: 22      Past Medical History:   Diagnosis Date    Anxiety     Asthma     Atrial fibrillation (HCC)     Back pain     BPH (benign prostatic hyperplasia)     Dental disease     Depression     Diarrhea     MALONE (dyspnea on exertion)     Frequent falls     GERD (gastroesophageal reflux disease)     Memory loss     Obesity     Panic disorder     Parkinson's disease ([address]     Testicular cancer ([facility] [address]     Visual impairment      Past Surgical History:   Procedure Laterality Date    BRAIN SURGERY      Neurostimulator implant for Parkinson's    BROW LIFT      CARPAL TUNNEL RELEASE Right     CHOLECYSTECTOMY      COLONOSCOPY      CYSTOSCOPY      HERNIA REPAIR      LUMBAR LAMINECTOMY      LYMPH NODE DISSECTION      ORCHIECTOMY      SHOULDER ARTHROSCOPY Right     TOTAL KNEE ARTHROPLASTY Bilateral     TURP      WRIST SURGERY Left     cyst removal       Chart Reviewed: Yes  Patient assessed for rehabilitation services?: Yes  Additional Pertinent Hx: Rekha Red is a 79 y.o. male With medical history of Atrial Fib and parkinsons disease who presented to undergo spinal surgery to remediate L1-2 stenosis and degenerative spondylolisthesis at L3-4 and presents with post-op back pain from spinal cord compression s/p laminectomy of L1-4 and PLIF L3-4 performed by Dr. Niru Berrios at [facility] on 22 . The patient admits to  [facility] / Caregiver Present: No  Referring Practitioner: Dr. Zoey Jones  Referral Date : 12/15/22  Diagnosis: Spinal Cord Compression  General Comment  Comments: Tete Mcfarland per Nurse Vasyl Mead to proceed with therapy assessments    Restrictions:  Restrictions/Precautions: Surgical Protocols; Fall Risk;General Precautions; Up as Tolerated  Required Braces or Orthoses  Spinal: Lumbar Corset  Position Activity Restriction  Spinal Precautions: Avoid Excessive Bending, Lifting, and Twisting of spine  Other position/activity restrictions: LSO to be donned when OOB; Les Master per Dr. Luis Chris to shower POD #7 (Sx date: 12/7/22). PT/OT therapy staff may titrate oxygen down as tolerated in therapy, but may only titrate up to maximum 4 L NC as needed in therapy. Nursing should perform oxygen titration > 4 L. SUBJECTIVE  Subjective: pt is agreeable to therapy assessments;            OBJECTIVE                    Functional Mobility  Bed mobility  Bed Mobility Comments: Bed mobility is not assessed this AM as pt is already up seated in wheelchair and retires to recliner at end of session  Transfers  Sit to Stand: Minimal Assistance  Stand to Sit: Minimal Assistance  Bed to Chair: Minimal assistance  Stand Pivot Transfers: Minimal Assistance  Comment: posterior lean with transfers. education instruction for forward nose over toes rocking to assist patient technique. Environmental Mobility  Ambulation  Surface: Level tile; Ramp  Device: Rolling Walker  Other Apparatus: Wheelchair follow (spouse assisting with follow)  Assistance: Minimal assistance  Quality of Gait: heavy flexed at knees; difficulty with turns  Gait Deviations: Decreased step height;Decreased step length;Decreased head and trunk rotation; Slow Katey; Shuffles (on RLE)  Distance: 110 feet x 5  Comments: worked on transfer and ambulation at bedside for technique safety from varied surfaces and surfaces height. spouse present and discussed mobility for couch height. More Ambulation?: Yes  Ambulation 2  Surface - 2: level tile  Device 2: Lau rail  Assistance 2: Minimal assistance  Quality of Gait 2: forward trunk, very NBOS, unsteady, intermittent scissor gait (especially during turning), Flatfoot initial contact bilaterally, and difficulty with turns  Gait Deviations: Decreased step length;Decreased step height;Decreased head and trunk rotation; Shuffles; Slow Katey  Distance: 40 feet  Comments: Maximal verbal, visual, tactile cues for posture, step width, straightening knees, and maintaining forward gaze. Bonne Major all of which the patient has difficulty maintaining for > 5 seconds when focusing to correct deviations. Stairs  # Steps : 6  Stairs Height: 8\"  Rails: Right ascending  Assistance: Moderate assistance    PT Exercises  A/AROM Exercises: Seated B LE with #2  and emphasis based movements x10-15  Dynamic Standing Balance Exercises: in // bars: Calf raises, B TKE with LGTB with focus on improving postural awareness. Exercise Equipment: [employer] 2 laps average 72 SPM,  L4    ASSESSMENT       Activity Tolerance  Activity Tolerance: Patient limited by fatigue          GOALS  Patient Goals   Patient Goals : patient \"I havn't really thought that far\". Spouse \"Strengthen Legs and improve safety\"  Short Term Goals  Time Frame for Short Term Goals: 7 days  Short Term Goal 1: pt to demo all Bed mobility with Rajni on flattened bed  Short Term Goal 2: pt to perform transfers with RW and Rajni  Short Term Goal 3: pt to ambulate 150' with RW and CGAx1  Short Term Goal 4: pt to negotiate single platform step + 2 successive steps (No rails) with Rajni x1 to allow for safe home access  Short Term Goal 5: pt to verbally identify 3/3 spinal precautions to maximize safety and functional outcomes.   Long Term Goals  Time Frame for Long Term Goals : Until D/C  Long Term Goal 1: pt to demo all Bed mobility on flattened bed with supervision  Long Term Goal 2: pt to perform transfers with RW and supervision  Long Term Goal 3: pt to ambulate 150' with RW and SBAx1  Long Term Goal 4: pt to negotiate single platform step + 2 successive steps (No rails) with SBA to allow for safe home access  Long Term Goal 5: pt to improve BLE strength by 1/2 MMG to improve safety with mobility  Additional Goals?: Yes  Long term goal 6: pt to improve Sitting and standing balance to FAIR or better to decrease risk for falls during mobility  Long term goal 7: pt to demo simulated or actual car transfer with SBA  Long term goal 8: pt to negotiate FF of steps with Bilateral Rail and SBA to allow for safe access to basement level of home.    PLAN OF CARE  Frequency: 1-2 treatment sessions per day, 5-7 days per week       EDUCATION  Education  Education Given To: Patient;Family  Education Provided: Mobility Training;Transfer Training         Therapy Time   12/20/22 1013 12/20/22 1107 12/20/22 1748   PT Individual Minutes   Time In 1013 1107 1350   Time Out 1018 1208 1432   Minutes 5 61 42             Sofía Vega, PTA, 12/20/22 at 6:42 PM

## 2022-12-20 NOTE — PROGRESS NOTES
Speech Language Pathology  Speech Language Pathology  [facility] Unit at [facility]    Speech Language/cognitive Treatment Note    Date: 12/20/2022  Patients Name: Jovani Juarez  MRN: [id number]  Diagnosis:   Patient Active Problem List   Diagnosis Code    Atrial fibrillation [facility]) I48.91    Chronic back pain M54.9, G89.29    Erectile dysfunction N52.9    Gait disorder R26.9    Hypertension I10    Lower urinary tract symptoms R39.9    Malignant neoplasm of testis (HCC) C62.90    Osteoarthrosis M19.90    Parkinson disease ([address] G20    Peripheral nerve disease G62.9    Cord compression ([address] G95.20    Moderate malnutrition ([address] E44.0       Pain: none reported    Speech and Language Treatment  Treatment time: 1183-7407  (Short session d/t toileting)    Subjective: [x] Alert [x] Cooperative     [x] Confused     [] Agitated    [] Lethargic      Objective/Assessment:    Speech: Pt. Demonstrated moderate to severe dysarthria characterized by rapid rate of speech, imprecise articulation and reduced intensity. Intelligibility rated at 50%. Pt. Janett Sevilla. Re: compensatory dysarthria strategies and able to repeat trisyllabic words, phrases and short sentences given model 100% of the time, pt. Having difficulty carrying over into conversation/structured tasks. Cognition: image retention (15 min delay)- 60%, 70% c cues. 4 word attribute inclusion- 50%, 100% c cues. Name 12 items in category- 75%, 100% c cues. Pt. Demonstrated prolonged response latency. Pt. Wife present, assisting to provide pt. C encouragement. Pt. C increased confusion today. Frequently requesting to end tx, \"Im done with this\", \"Im bored\". Pt. Seated at EOB following toileting c PCT, kept standing up t/o treatment. Seated back down c verbal cues from ST and his wife. PCT in at end to session to inform ST pt. Now has 1:1 sitter ordered.      Plan:  [x] Continue ST services    [] Discharge from ST:      Discharge recommendations: []  Further therapy recommended at discharge. The patient should be able to tolerate at least 3 hours of therapy per day over 5 days or 15 hours over 7 days. [] Further therapy recommended at discharge. [] No therapy recommended at discharge.       Treatment completed by: FOREST Espinal, M.A., [id number] [facility]
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
## 2022-12-20 NOTE — PLAN OF CARE
Problem: Discharge Planning  Goal: Discharge to home or other facility with appropriate resources  12/20/2022 1606 by Zaida Perdomo LPN  Outcome: Not Progressing     Problem: Skin/Tissue Integrity  Goal: Absence of new skin breakdown  Description: 1. Monitor for areas of redness and/or skin breakdown  2. Assess vascular access sites hourly  3. Every 4-6 hours minimum:  Change oxygen saturation probe site  4. Every 4-6 hours:  If on nasal continuous positive airway pressure, respiratory therapy assess nares and determine need for appliance change or resting period. 12/20/2022 0916 by Zaida Perdomo LPN  Outcome: Not Progressing     Problem: Safety - Adult  Goal: Free from fall injury  12/20/2022 0916 by Zaida Perdomo LPN  Outcome: Not Progressing     Problem: Pain  Goal: Verbalizes/displays adequate comfort level or baseline comfort level  12/20/2022 0916 by Zaida Perdomo LPN  Outcome: Not Progressing     Problem: Confusion  Goal: Confusion, delirium, dementia, or psychosis is improved or at baseline  Description: INTERVENTIONS:  1. Assess for possible contributors to thought disturbance, including medications, impaired vision or hearing, underlying metabolic abnormalities, dehydration, psychiatric diagnoses, and notify attending LIP  2. [place] high risk fall precautions, as indicated  3. Provide frequent short contacts to provide reality reorientation, refocusing and direction  4. Decrease environmental stimuli, including noise as appropriate  5. Monitor and intervene to maintain adequate nutrition, hydration, elimination, sleep and activity  6. If unable to ensure safety without constant attention obtain sitter and review sitter guidelines with assigned personnel  7.  Initiate Psychosocial CNS and Spiritual Care consult, as indicated  12/20/2022 0916 by Zaida Perdomo LPN  Outcome: Not Progressing     Problem: ABCDS Injury Assessment  Goal: Absence of physical injury  12/20/2022 0916 by Ander Campos Ramu Aponte LPN  Outcome: Not Progressing     Problem: Nutrition Deficit:  Goal: Optimize nutritional status  12/20/2022 1606 by Talia Diaz LPN  Outcome: Not Progressing     Problem: Discharge Planning  Goal: Discharge to home or other facility with appropriate resources  12/20/2022 1606 by Talia Diaz LPN  Outcome: Not Progressing  12/20/2022 0916 by Talia Diaz LPN  Outcome: Not Progressing  12/20/2022 0649 by Charly Bruner RN  Outcome: Progressing     Problem: Skin/Tissue Integrity  Goal: Absence of new skin breakdown  Description: 1. Monitor for areas of redness and/or skin breakdown  2. Assess vascular access sites hourly  3. Every 4-6 hours minimum:  Change oxygen saturation probe site  4. Every 4-6 hours:  If on nasal continuous positive airway pressure, respiratory therapy assess nares and determine need for appliance change or resting period. 12/20/2022 1606 by Talia Diaz LPN  Outcome: Not Progressing  12/20/2022 0916 by Talia Diaz LPN  Outcome: Not Progressing  12/20/2022 0649 by Charly Bruner RN  Outcome: Progressing     Problem: Safety - Adult  Goal: Free from fall injury  12/20/2022 1606 by Talia Diaz LPN  Outcome: Not Progressing  12/20/2022 0916 by Talia Diaz LPN  Outcome: Not Progressing  12/20/2022 06Ben by Charly Bruner RN  Outcome: Progressing     Problem: Pain  Goal: Verbalizes/displays adequate comfort level or baseline comfort level  12/20/2022 1606 by Talia Diaz LPN  Outcome: Not Progressing  12/20/2022 0916 by Talia Diaz LPN  Outcome: Not Progressing  12/20/2022 06Ben by Charly Bruner RN  Outcome: Progressing     Problem: Confusion  Goal: Confusion, delirium, dementia, or psychosis is improved or at baseline  Description: INTERVENTIONS:  1. Assess for possible contributors to thought disturbance, including medications, impaired vision or hearing, underlying metabolic abnormalities, dehydration, psychiatric diagnoses, and notify attending LIP  2.  [place] high risk fall precautions, as indicated  3. Provide frequent short contacts to provide reality reorientation, refocusing and direction  4. Decrease environmental stimuli, including noise as appropriate  5. Monitor and intervene to maintain adequate nutrition, hydration, elimination, sleep and activity  6. If unable to ensure safety without constant attention obtain sitter and review sitter guidelines with assigned personnel  7.  Initiate Psychosocial CNS and Spiritual Care consult, as indicated  12/20/2022 1606 by Thalia Manning LPN  Outcome: Not Progressing  12/20/2022 0916 by Thalia Manning LPN  Outcome: Not Progressing  12/20/2022 0649 by Javier Harrell RN  Outcome: Progressing  Flowsheets (Taken 12/19/2022 2014)  Effect of thought disturbance (confusion, delirium, dementia, or psychosis) are managed with adequate functional status:   [place] high risk fall precautions, as indicated   Provide frequent short contacts to provide reality reorientation, refocusing and direction   Decrease environmental stimuli, including noise as appropriate     Problem: ABCDS Injury Assessment  Goal: Absence of physical injury  12/20/2022 1606 by Thalia Manning LPN  Outcome: Not Progressing  12/20/2022 0916 by Thalia Manning LPN  Outcome: Not Progressing  12/20/2022 06Ben by Javier Harrell RN  Outcome: Progressing     Problem: Nutrition Deficit:  Goal: Optimize nutritional status  12/20/2022 1606 by Thalia Manning LPN  Outcome: Not Progressing  12/20/2022 0916 by Thalia Manning LPN  Outcome: Not Progressing  12/20/2022 06Ben by Javier Harrell RN  Outcome: Progressing

## 2022-12-20 NOTE — PROGRESS NOTES
Pharmacy Note  Warfarin Consult follow-up      Recent Labs     12/18/22  0634 12/19/22  0633 12/20/22  0613   INR 2.0 2.4 2.4     No results for input(s): HGB, HCT, PLT in the last 72 hours. Significant Drug-Drug Interactions:  New warfarin drug-drug interactions: None  Discontinued drug-drug interactions: None  Current warfarin drug-drug interactions: Lexapro      Date             INR        Dose given previous day  Dose scheduled for today  12/20/2022            2.4       5mg           5mg        Notes:                     Daily PT/INR while inpatient.    Cirilo Grant RPH,RPH, MS   12/20/2022  7:17 AM

## 2022-12-21 LAB
EKG ATRIAL RATE: 69 BPM
EKG Q-T INTERVAL: 394 MS
EKG QRS DURATION: 92 MS
EKG QTC CALCULATION (BAZETT): 437 MS
EKG R AXIS: -26 DEGREES
EKG T AXIS: -12 DEGREES
EKG VENTRICULAR RATE: 74 BPM

## 2022-12-21 PROCEDURE — 1180000000 HC REHAB R&B

## 2022-12-21 PROCEDURE — 92507 TX SP LANG VOICE COMM INDIV: CPT

## 2022-12-21 PROCEDURE — 93010 ELECTROCARDIOGRAM REPORT: CPT | Performed by: INTERNAL MEDICINE

## 2022-12-21 PROCEDURE — 97110 THERAPEUTIC EXERCISES: CPT

## 2022-12-21 PROCEDURE — 99232 SBSQ HOSP IP/OBS MODERATE 35: CPT | Performed by: PHYSICAL MEDICINE & REHABILITATION

## 2022-12-21 PROCEDURE — 97535 SELF CARE MNGMENT TRAINING: CPT

## 2022-12-21 PROCEDURE — 6370000000 HC RX 637 (ALT 250 FOR IP): Performed by: STUDENT IN AN ORGANIZED HEALTH CARE EDUCATION/TRAINING PROGRAM

## 2022-12-21 PROCEDURE — 97116 GAIT TRAINING THERAPY: CPT

## 2022-12-21 PROCEDURE — 97129 THER IVNTJ 1ST 15 MIN: CPT

## 2022-12-21 PROCEDURE — 6370000000 HC RX 637 (ALT 250 FOR IP): Performed by: PHYSICAL MEDICINE & REHABILITATION

## 2022-12-21 PROCEDURE — 97530 THERAPEUTIC ACTIVITIES: CPT

## 2022-12-21 PROCEDURE — 99232 SBSQ HOSP IP/OBS MODERATE 35: CPT | Performed by: INTERNAL MEDICINE

## 2022-12-21 RX ORDER — WARFARIN SODIUM 5 MG/1
5 TABLET ORAL
Status: COMPLETED | OUTPATIENT
Start: 2022-12-21 | End: 2022-12-21

## 2022-12-21 RX ADMIN — POLYETHYLENE GLYCOL 3350 17 G: 17 POWDER, FOR SOLUTION ORAL at 21:36

## 2022-12-21 RX ADMIN — CARBIDOPA AND LEVODOPA 1 TABLET: 25; 100 TABLET, EXTENDED RELEASE ORAL at 21:39

## 2022-12-21 RX ADMIN — SENNOSIDES 17.2 MG: 8.6 TABLET, FILM COATED ORAL at 21:36

## 2022-12-21 RX ADMIN — WARFARIN SODIUM 5 MG: 5 TABLET ORAL at 17:55

## 2022-12-21 RX ADMIN — CARBIDOPA AND LEVODOPA 1 TABLET: 25; 100 TABLET ORAL at 10:00

## 2022-12-21 RX ADMIN — CARBIDOPA AND LEVODOPA 1 TABLET: 25; 100 TABLET, EXTENDED RELEASE ORAL at 10:00

## 2022-12-21 RX ADMIN — DIPHENHYDRAMINE HYDROCHLORIDE, ZINC ACETATE: 2; .1 CREAM TOPICAL at 02:34

## 2022-12-21 RX ADMIN — CARBIDOPA AND LEVODOPA 1 TABLET: 25; 100 TABLET ORAL at 21:39

## 2022-12-21 RX ADMIN — ESCITALOPRAM OXALATE 20 MG: 10 TABLET ORAL at 10:01

## 2022-12-21 RX ADMIN — POLYETHYLENE GLYCOL 3350 17 G: 17 POWDER, FOR SOLUTION ORAL at 10:00

## 2022-12-21 RX ADMIN — RASAGILINE 1 MG: 1 TABLET ORAL at 10:00

## 2022-12-21 ASSESSMENT — ENCOUNTER SYMPTOMS
RESPIRATORY NEGATIVE: 1
ALLERGIC/IMMUNOLOGIC NEGATIVE: 1
GASTROINTESTINAL NEGATIVE: 1
EYES NEGATIVE: 1

## 2022-12-21 NOTE — PROGRESS NOTES
Speech Language Pathology  Speech Language Pathology  [facility] Acute Rehab Unit at [facility]    Speech Language/cognitive Treatment Note    Date: 12/21/2022  Patients Name: Maria A Jefferson  MRN: [id number]  Diagnosis:   Patient Active Problem List   Diagnosis Code    Atrial fibrillation [facility]) I48.91    Chronic back pain M54.9, G89.29    Erectile dysfunction N52.9    Gait disorder R26.9    Hypertension I10    Lower urinary tract symptoms R39.9    Malignant neoplasm of testis (HCC) C62.90    Osteoarthrosis M19.90    Parkinson disease ([address] G20    Peripheral nerve disease G62.9    Cord compression ([address] G95.20    Moderate malnutrition ([address] E44.0       Pain: none reported    Speech and Language Treatment  Treatment time: 4820-9612    Subjective: [x] Alert [x] Cooperative     [] Confused     [] Agitated    [] Lethargic      Objective/Assessment:    Speech: Pt. Demonstrated moderate dysarthria characterized by rapid rate of speech, imprecise articulation and reduced intensity. Intelligibility rated at 60%. Pt. Fernanda Gale. Re: compensatory dysarthria strategies and able to repeat  phrases and short sentences given model 100% of the time, pt. Having difficulty carrying over into conversation/structured tasks, requiring mod A. Cognition: image retention (15 min delay)- 80%, 90% c cues. Name 12 items in category- 84%, 100% c cues. Pt. Demonstrated prolonged response latency. Orientation- 50%    Pt. Wife present. Plan:  [x] Continue ST services    [] Discharge from ST:      Discharge recommendations: []  Further therapy recommended at discharge. The patient should be able to tolerate at least 3 hours of therapy per day over 5 days or 15 hours over 7 days. [] Further therapy recommended at discharge. [] No therapy recommended at discharge.       Treatment completed by: Meryle Mcbride, SLP, M.A., Jessica Slater
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
## 2022-12-21 NOTE — PROGRESS NOTES
Physical Therapy  Facility/Department: [facility]  Rehabilitation Physical Therapy  NAME: Alejandro Lucero  : 1955 (79 y.o.)  MRN: [id number]  CODE STATUS: Full Code    Date of Service: 22      Past Medical History:   Diagnosis Date    Anxiety     Asthma     Atrial fibrillation (HCC)     Back pain     BPH (benign prostatic hyperplasia)     Dental disease     Depression     Diarrhea     MALONE (dyspnea on exertion)     Frequent falls     GERD (gastroesophageal reflux disease)     Memory loss     Obesity     Panic disorder     Parkinson's disease ([address]     Testicular cancer ([facility]     Visual impairment      Past Surgical History:   Procedure Laterality Date    BRAIN SURGERY      Neurostimulator implant for Parkinson's    BROW LIFT      CARPAL TUNNEL RELEASE Right     CHOLECYSTECTOMY      COLONOSCOPY      CYSTOSCOPY      HERNIA REPAIR      LUMBAR LAMINECTOMY      LYMPH NODE DISSECTION      ORCHIECTOMY      SHOULDER ARTHROSCOPY Right     TOTAL KNEE ARTHROPLASTY Bilateral     TURP      WRIST SURGERY Left     cyst removal       Chart Reviewed: Yes  Patient assessed for rehabilitation services?: Yes  Additional Pertinent Hx: Jackie Mendoza is a 79 y.o. male With medical history of Atrial Fib and parkinsons disease who presented to undergo spinal surgery to remediate L1-2 stenosis and degenerative spondylolisthesis at L3-4 and presents with post-op back pain from spinal cord compression s/p laminectomy of L1-4 and PLIF L3-4 performed by Dr. Georgette Lam at [facility] on 22 . The patient admits to  [facility] / Caregiver Present: Yes  Referring Practitioner: Dr. Julián Joya  Referral Date : 12/15/22  Diagnosis: Spinal Cord Compression  General Comment  Comments: Rubin Hawkins per Nurse Gaby Tompkins to proceed with therapy assessments    Restrictions:  Restrictions/Precautions: Surgical Protocols; Fall Risk;General Precautions; Up as Tolerated  Required Braces or Orthoses  Spinal: Lumbar Corset  Position Activity Restriction  Other position/activity restrictions: LSO to be donned when OOB; [id number] per Dr. Gita Wagner to shower POD #7 (Sx date: 12/7/22). PT/OT therapy staff may titrate oxygen down as tolerated in therapy, but may only titrate up to maximum 4 L NC as needed in therapy. Nursing should perform oxygen titration > 4 L. SUBJECTIVE  Subjective: pt is agreeable to therapy      OBJECTIVE                       Functional Mobility  Transfers  Sit to Stand: Minimal Assistance  Stand to Sit: Minimal Assistance  Bed to Chair: Minimal assistance  Stand Pivot Transfers: Minimal Assistance  Comment: posterior lean with transfers. education instruction for forward nose over toes rocking to assist patient technique. Environmental Mobility  Ambulation  Surface: Level tile; Ramp  Device: Rolling Walker  Other Apparatus: Wheelchair follow (spouse assisting with follow)  Assistance: Minimal assistance  Quality of Gait: heavy flexed at knees; difficulty with turns  Gait Deviations: Decreased step height;Decreased step length;Decreased head and trunk rotation; Slow Katey; Shuffles (on RLE)  Distance: 110 feet x 5  Comments: worked on transfer and ambulation at bedside for technique safety from varied surfaces and surfaces height. spouse present and discussed mobility for couch height. More Ambulation?: Yes  Ambulation 2  Surface - 2: level tile  Device 2: Lau rail  Assistance 2: Minimal assistance  Quality of Gait 2: forward trunk, very NBOS, unsteady, intermittent scissor gait (especially during turning), Flatfoot initial contact bilaterally, and difficulty with turns, freezing at times with floor change and directional change  Gait Deviations: Decreased step length;Decreased step height;Decreased head and trunk rotation; Shuffles; Slow Katey  Distance: 40 feet  Comments: Maximal verbal, visual, tactile cues for posture, step width, straightening knees, and maintaining forward gaze. Bijan Gutter  all of which the patient has difficulty maintaining for > 5 seconds when focusing to correct deviations. Stairs  # Steps : 6  Stairs Height: 8\"  Rails: Right ascending  Device: Rolling walker  Assistance: Minimal assistance    PT Exercises  A/AROM Exercises: Seated B LE with #2  and emphasis based movements x10-15  Dynamic Standing Balance Exercises: in // bars: Calf raises, B TKE with LGTB with focus on improving postural awareness. Exercise Equipment: NuStep 2 laps average 72 SPM,  L4    ASSESSMENT       Activity Tolerance  Activity Tolerance: Patient limited by fatigue  Activity Tolerance Comments: frequent standing rest breaks d/t noted SOB with activity. Need chair immediately to sit at times, spouse supportive of needs at this time. GOALS  Patient Goals   Patient Goals : patient \"I havn't really thought that far\". Spouse \"Strengthen Legs and improve safety\"  Short Term Goals  Time Frame for Short Term Goals: 7 days  Short Term Goal 1: pt to demo all Bed mobility with Rajni on flattened bed  Short Term Goal 2: pt to perform transfers with RW and Rajni  Short Term Goal 3: pt to ambulate 150' with RW and CGAx1  Short Term Goal 4: pt to negotiate single platform step + 2 successive steps (No rails) with Rajni x1 to allow for safe home access  Short Term Goal 5: pt to verbally identify 3/3 spinal precautions to maximize safety and functional outcomes.   Long Term Goals  Time Frame for Long Term Goals : Until D/C  Long Term Goal 1: pt to demo all Bed mobility on flattened bed with supervision  Long Term Goal 2: pt to perform transfers with RW and supervision  Long Term Goal 3: pt to ambulate 150' with RW and SBAx1  Long Term Goal 4: pt to negotiate single platform step + 2 successive steps (No rails) with SBA to allow for safe home access  Long Term Goal 5: pt to improve BLE strength by 1/2 MMG to improve safety with mobility  Additional Goals?: Yes  Long term goal 6: pt to improve Sitting and standing balance to FAIR or better to decrease risk for falls during mobility  Long term goal 7: pt to demo simulated or actual car transfer with SBA  Long term goal 8: pt to negotiate FF of steps with Bilateral Rail and SBA to allow for safe access to basement level of home. PLAN OF CARE  Frequency: 1-2 treatment sessions per day, 5-7 days per week  Safety Devices  Type of Devices: Call light within reach; Chair alarm in place; Left in chair (spouse present)    EDUCATION  Education  Education Given To: Patient; Family  Education Provided:  Mobility Training;Transfer Training      Therapy Time   12/21/22 1629 12/21/22 1630   PT Individual Minutes   Time In [phone]   Time Out 1207 1407   Minutes 64 509 [facility], 12/21/22 at 5:10 PM

## 2022-12-21 NOTE — CARE COORDINATION
ONGOING ARU DISCHARGE PLAN:    Patient is alert and oriented x4. Spoke with patient regarding discharge plan and patient confirms plan     DME: Walker/ folding walker Adult size ordered through [facility]    Will continue to follow for additional discharge needs.     Electronically signed by Gideon Benítez RN on 12/20/2022 at 10:51 PM

## 2022-12-21 NOTE — PLAN OF CARE
Problem: Discharge Planning  Goal: Discharge to home or other facility with appropriate resources  12/21/2022 0359 by Soha Sandra RN  Outcome: Progressing     Problem: Skin/Tissue Integrity  Goal: Absence of new skin breakdown  Description: 1. Monitor for areas of redness and/or skin breakdown  2. Assess vascular access sites hourly  3. Every 4-6 hours minimum:  Change oxygen saturation probe site  4. Every 4-6 hours:  If on nasal continuous positive airway pressure, respiratory therapy assess nares and determine need for appliance change or resting period. 12/21/2022 0359 by Soha Sandra RN  Outcome: Progressing     Problem: Safety - Adult  Goal: Free from fall injury  12/21/2022 0359 by Soha Sandra RN  Outcome: Progressing     Problem: Pain  Goal: Verbalizes/displays adequate comfort level or baseline comfort level  12/21/2022 0359 by Soha Sandra RN  Outcome: Progressing     Problem: ABCDS Injury Assessment  Goal: Absence of physical injury  12/21/2022 0359 by Soha Sandra RN  Outcome: Progressing     Problem: Nutrition Deficit:  Goal: Optimize nutritional status  12/21/2022 0359 by Soha Sandra RN  Outcome: Progressing     Problem: Discharge Planning  Goal: Discharge to home or other facility with appropriate resources  12/21/2022 0359 by Soha Sandra RN  Outcome: Progressing  12/20/2022 1606 by Geraldine De Dios LPN  Outcome: Not Progressing     Problem: Skin/Tissue Integrity  Goal: Absence of new skin breakdown  Description: 1. Monitor for areas of redness and/or skin breakdown  2. Assess vascular access sites hourly  3. Every 4-6 hours minimum:  Change oxygen saturation probe site  4. Every 4-6 hours:  If on nasal continuous positive airway pressure, respiratory therapy assess nares and determine need for appliance change or resting period.   12/21/2022 0359 by Soha Sandra RN  Outcome: Progressing  12/20/2022 1606 by Geraldine De Dios LPN  Outcome: Not Progressing Problem: Safety - Adult  Goal: Free from fall injury  12/21/2022 0359 by Briseida Castrejon RN  Outcome: Progressing  12/20/2022 1606 by Leon Breen LPN  Outcome: Not Progressing     Problem: Pain  Goal: Verbalizes/displays adequate comfort level or baseline comfort level  12/21/2022 0359 by Briseida Castrejon RN  Outcome: Progressing  12/20/2022 1606 by Leon Breen LPN  Outcome: Not Progressing     Problem: Confusion  Goal: Confusion, delirium, dementia, or psychosis is improved or at baseline  Description: INTERVENTIONS:  1. Assess for possible contributors to thought disturbance, including medications, impaired vision or hearing, underlying metabolic abnormalities, dehydration, psychiatric diagnoses, and notify attending LIP  2. [place] high risk fall precautions, as indicated  3. Provide frequent short contacts to provide reality reorientation, refocusing and direction  4. Decrease environmental stimuli, including noise as appropriate  5. Monitor and intervene to maintain adequate nutrition, hydration, elimination, sleep and activity  6. If unable to ensure safety without constant attention obtain sitter and review sitter guidelines with assigned personnel  7.  Initiate Psychosocial CNS and Spiritual Care consult, as indicated  12/21/2022 0359 by Briseida Castrejon RN  Outcome: Progressing  12/20/2022 1606 by Leon Breen LPN  Outcome: Not Progressing     Problem: ABCDS Injury Assessment  Goal: Absence of physical injury  12/21/2022 0359 by Briseida Castrejon RN  Outcome: Progressing  12/20/2022 1606 by Leon Breen LPN  Outcome: Not Progressing     Problem: Nutrition Deficit:  Goal: Optimize nutritional status  12/21/2022 0359 by Briseida Castrejon RN  Outcome: Progressing  12/20/2022 1606 by Leon Breen LPN  Outcome: Not Progressing

## 2022-12-21 NOTE — PROGRESS NOTES
HARITHA BABC[facility] Internal Medicine  Niurka Deluna MD; Don Soriano MD; Giovanni Singh MD; Winnie Closs, MD Loreen Lords, MD; MD LOLY [facility] Internal Medicine   [facility]    HISTORY AND PHYSICAL EXAMINATION            Date:   12/20/2022  Patient name:  Camila Jorgensen  Date of admission:  12/15/2022  5:57 PM  MRN:   [id number]  Account:  [de-identified]  YOB: 1955  PCP:    Blondell Eisenmenger, MD  Room:   [facility]3567-60  Code Status:    Full Code    Chief Complaint:       Back pain from spinal stenosis s/p laminectomy    History Obtained From:     Patient and electronic medical record    History of Present Illness:     Camila Jorgensen is a 79 y.o. Unavailable / unknown male who has past medical history of a . Fib and parkinson presents with back pain from spinal cord compression s/p laminectomy. He is admitted for acute rehabilitation. Laminectomy done at [facility] 1 week back. Currently patient denied back pain. He dose have weakness in bilateral lower extremity 4/5.      Past Medical History:     Past Medical History:   Diagnosis Date    Anxiety     Asthma     Atrial fibrillation (HCC)     Back pain     BPH (benign prostatic hyperplasia)     Dental disease     Depression     Diarrhea     MALONE (dyspnea on exertion)     Frequent falls     GERD (gastroesophageal reflux disease)     Memory loss     Obesity     Panic disorder     Parkinson's disease ([address]     Testicular cancer ([address]     Visual impairment         Past Surgical History:     Past Surgical History:   Procedure Laterality Date    BRAIN SURGERY      Neurostimulator implant for Parkinson's    BROW LIFT      CARPAL TUNNEL RELEASE Right     CHOLECYSTECTOMY      COLONOSCOPY      CYSTOSCOPY      HERNIA REPAIR      LUMBAR LAMINECTOMY      LYMPH NODE DISSECTION      ORCHIECTOMY      SHOULDER ARTHROSCOPY Right     TOTAL KNEE ARTHROPLASTY Bilateral     TURP      WRIST SURGERY Left     cyst removal        Medications Prior to Admission:     Prior to Admission medications    Not on File        Allergies:     Patient has no known allergies. Social History:     Tobacco:    reports that he has never smoked. He has never used smokeless tobacco.  Alcohol:      has no history on file for alcohol use. Drug Use:  has no history on file for drug use. Family History:     Family History   Problem Relation Age of Onset    Other Mother     Heart Failure Father     Other Sister     Behavior Problems Sister     No Known Problems Brother        Review of Systems:     Review of Systems   Constitutional: Negative. HENT: Negative. Eyes: Negative. Respiratory: Negative. Cardiovascular: Negative. Gastrointestinal: Negative. Endocrine: Negative. Genitourinary: Negative. Musculoskeletal: Negative. Bilateral lower extremity weakness. Skin: Negative. Allergic/Immunologic: Negative. Neurological: Negative. Hematological: Negative. Psychiatric/Behavioral: Negative. Guera Stands Physical Exam:     Physical Exam   Vitals:    Vitals:    22 0706 22 1943 22 2157   BP: (!) 144/80 (!) 140/81 136/79    Pulse:  61 (!) 114 65   Resp:  18 16    Temp:   98.2 °F (36.8 °C)    TempSrc:       SpO2:  94% 95%    Weight:       Height:                        Body mass index is 31.37 kg/m². Temp (24hrs), Av.2 °F (36.8 °C), Min:98.2 °F (36.8 °C), Max:98.2 °F (36.8 °C)    No results for input(s): POCGLU in the last 72 hours. General Appearance:   well apearing             Skin:                             No rash or erythema  HEENT ;                                                                       No icterus, no pallor . No ptosis.     No gross asymmetry  Or abnormality face         Neck:                            No mass , no thyroid enlargement                                           Pulmonary/Chest: Symmetric                                          Clear to auscultation bilaterally . No wheezes,                                          No rales or rhonchi . No abnormality on percussion                                                        Cardiovascular:            Normal rate, regular rhythm,                                          No murmur or  Gallop . Abdomen:                       Soft, non-tender                                           Normal bowels sounds,                                             Extremities:                    normal sensation.  Power 4/5 in bilateral lower extremities                                           Musculo-skeletal / Neurological ;;                                                                                               Investigations:      URINE ANALYSIS: No results found for: LABURIN     CBC:  Lab Results   Component Value Date/Time    WBC 7.0 12/16/2022 06:21 AM    HGB 12.5 12/16/2022 06:21 AM     12/16/2022 06:21 AM             BMP:    Lab Results   Component Value Date/Time     12/16/2022 06:21 AM    K 3.9 12/16/2022 06:21 AM     12/16/2022 06:21 AM    CO2 25 12/16/2022 06:21 AM    BUN 23 12/16/2022 06:21 AM    CREATININE 0.80 12/16/2022 06:21 AM    GLUCOSE 95 12/16/2022 06:21 AM      LIVER PROFILE:  Lab Results   Component Value Date/Time    ALT 44 12/16/2022 06:21 AM    AST 35 12/16/2022 06:21 AM    PROT 7.0 12/16/2022 06:21 AM    BILITOT 0.7 12/16/2022 06:21 AM    BILIDIR 0.2 12/16/2022 06:21 AM    LABALBU 4.0 12/16/2022 06:21 AM             @BRIEFLABT(TSH)@      Laboratory Testing:  Recent Results (from the past 24 hour(s))   Protime-INR    Collection Time: 12/20/22  6:13 AM   Result Value Ref Range    Protime 26.1 (H) 11.8 - 14.6 sec    INR 2.4    EKG 12 Lead    Collection Time: 12/20/22  3:45 PM   Result Value Ref Range    Ventricular Rate 74 BPM    Atrial Rate 69 BPM    QRS Duration 92 ms    Q-T Interval 394 ms    QTc Calculation (Bazett) 437 ms    R Axis -26 degrees    T Axis -12 degrees       Imaging/Diagnostics:  No results found. Assessment :      Hospital Problems             Last Modified POA    * (Principal) Cord compression ([address] 12/15/2022 Yes    Moderate malnutrition ([address] 12/16/2022 Yes   Maria A Jefferson is a 79 y.o. Unavailable / unknown male who has past medical history of a . Fib and parkinson presents with back pain from spinal cord compression s/p laminectomy. He is admitted for acute rehabilitation. Laminectomy done at [facility] 1 week back. Plan:       Medications: Allergies:  No Known Allergies    Current Meds:   Scheduled Meds:    senna  2 tablet Oral Nightly    polyethylene glycol  17 g Oral BID    Opicapone  50 mg Oral Nightly    mirabegron  50 mg Oral Nightly    carbidopa-levodopa  1 tablet Oral BID    carbidopa-levodopa  1 tablet Oral BID    escitalopram  20 mg Oral Daily    rasagiline mesylate  1 mg Oral Daily    warfarin placeholder: dosing by pharmacy   Other RX Placeholder     Continuous Infusions:   PRN Meds: oxyCODONE-acetaminophen, diphenhydrAMINE-zinc acetate, tiZANidine, acetaminophen, bisacodyl          12/20/2022    Admitted for acute rehab after laminectomy for spinal stenosis   Hemodynamically stable, saturating well in room air. Continue physical therapy and occupation therapy. 12/19/22    Patient tolerating rehabilitative therapies . Progressing fairly well . Continue present therapy . 12/20   Patient advancing well with physical therapy  Hypertension, controlled  On Coumadin, INR is 2.4  History of Parkinson disease on Sinemet      MD LOLY [place], [address] [place] [address].    Phone [phone]   Fax: [phone]  Answering Service: [phone] 12/20/2022  10:31 PM    Copy sent to Dr. Lisa Snyder MD    Please note that this chart was generated using voice recognition Dragon dictation software. Although every effort was made to ensure the accuracy of this automated transcription, some errors in transcription may have occurred.

## 2022-12-21 NOTE — PROGRESS NOTES
Pharmacy Note  Warfarin Consult follow-up      Recent Labs     12/19/22  0633 12/20/22  0613   INR 2.4 2.4     No results for input(s): HGB, HCT, PLT in the last 72 hours. Significant Drug-Drug Interactions:  New warfarin drug-drug interactions: none  Discontinued drug-drug interactions: none  Current warfarin drug-drug interactions: sinemet, lexapro, rasagiline      Date             INR        Dose given previous day  Dose scheduled for today  12/21/2022            2.4        5 mg           5 mg        Notes:                     Daily PT/INR while inpatient.      Vaughn Almeida RPH,PharmD,  12/21/2022, 12:01 PM

## 2022-12-21 NOTE — PROGRESS NOTES
Comprehensive Nutrition Assessment    Type and Reason for Visit:  Reassess    Nutrition Recommendations/Plan:   Continue current diet. Continue oral nutrition supplements. Malnutrition Assessment:  Malnutrition Status: Moderate malnutrition (12/16/22 6654)    Context:  Acute Illness     Findings of the 6 clinical characteristics of malnutrition:  Energy Intake:  75% or less of estimated energy requirements for 7 or more days  Weight Loss:  Greater than 5% over 1 month     Body Fat Loss:  Unable to assess     Muscle Mass Loss:  Unable to assess    Fluid Accumulation:  No significant fluid accumulation     Strength:  Not Performed    Nutrition Assessment:    Pt has been enjoying the meals and supplements with % intake at most times. Nutrition Related Findings:    No edema. Labs and meds reviewed. Wound Type: Surgical Incision       Current Nutrition Intake & Therapies:    Average Meal Intake: %  Average Supplements Intake: %  ADULT DIET; Regular  ADULT ORAL NUTRITION SUPPLEMENT; Lunch, Dinner; Diabetic Oral Supplement    Anthropometric Measures:  Height: 6' (182.9 cm)  Ideal Body Weight (IBW): 178 lbs (81 kg)    Admission Body Weight: 231 lb 4.2 oz (104.9 kg)  Current Body Weight: 241 lb 6.5 oz (109.5 kg), 129.9 % IBW. Weight Source: Bed Scale  Current BMI (kg/m2): 32.7  Usual Body Weight: 245 lb (111.1 kg) (per pt and wife)  % Weight Change (Calculated): -5.6                    BMI Categories: Obese Class 1 (BMI 30.0-34. 9)    Estimated Daily Nutrient Needs:  Energy Requirements Based On: Formula  Weight Used for Energy Requirements: Admission  Energy (kcal/day): 7814-1541 based on [place] Jeor with 1.2-1.3 factor  Weight Used for Protein Requirements: Ideal  Protein (g/day): 105-122 based on 1.3-1.5 gm per kg      Nutrition Diagnosis:   Moderate malnutrition related to inadequate protein-energy intake as evidenced by Criteria as identified in malnutrition assessment    Nutrition Interventions:   Food and/or Nutrient Delivery: Continue Current Diet, Continue Oral Nutrition Supplement  Nutrition Education/Counseling: No recommendation at this time  Coordination of Nutrition Care: Continue to monitor while inpatient       Goals:  Previous Goal Met: Progressing toward Goal(s)  Goals: PO intake 75% or greater       Nutrition Monitoring and Evaluation:   Behavioral-Environmental Outcomes: None Identified  Food/Nutrient Intake Outcomes: Food and Nutrient Intake, Supplement Intake  Physical Signs/Symptoms Outcomes: Biochemical Data, Weight, Skin    Discharge Planning:     Too soon to determine     Gianna Valdez RD, LD  Contact: [phone]

## 2022-12-21 NOTE — PLAN OF CARE
Problem: Discharge Planning  Goal: Discharge to home or other facility with appropriate resources  12/21/2022 1450 by Kenyon Muñoz RN  Outcome: Progressing  Flowsheets (Taken 12/21/2022 1008)  Discharge to home or other facility with appropriate resources: Identify barriers to discharge with patient and caregiver   Pt to discharge home with wife once medically stable. Problem: Skin/Tissue Integrity  Goal: Absence of new skin breakdown  Description: 1. Monitor for areas of redness and/or skin breakdown  2. Assess vascular access sites hourly  3. Every 4-6 hours minimum:  Change oxygen saturation probe site  4. Every 4-6 hours:  If on nasal continuous positive airway pressure, respiratory therapy assess nares and determine need for appliance change or resting period. 12/21/2022 1450 by Kenyon Muñoz RN  Outcome: Progressing   Monitoring. No new skin breakdown noted this shift. Problem: Safety - Adult  Goal: Free from fall injury  12/21/2022 1450 by Kenyon Muñoz RN  Outcome: Progressing  Flowsheets (Taken 12/21/2022 1006)  Free From Fall Injury: Instruct family/caregiver on patient safety   No injury noted this shift. Problem: Pain  Goal: Verbalizes/displays adequate comfort level or baseline comfort level  12/21/2022 1450 by Kenyon Muñoz RN  Outcome: Progressing   Pt denies pain this shift. Problem: Confusion  Goal: Confusion, delirium, dementia, or psychosis is improved or at baseline  Description: INTERVENTIONS:  1. Assess for possible contributors to thought disturbance, including medications, impaired vision or hearing, underlying metabolic abnormalities, dehydration, psychiatric diagnoses, and notify attending LIP  2. [place] high risk fall precautions, as indicated  3. Provide frequent short contacts to provide reality reorientation, refocusing and direction  4. Decrease environmental stimuli, including noise as appropriate  5.  Monitor and intervene to maintain adequate nutrition, hydration, elimination, sleep and activity  6. If unable to ensure safety without constant attention obtain sitter and review sitter guidelines with assigned personnel  7. Initiate Psychosocial CNS and Spiritual Care consult, as indicated  12/21/2022 1450 by Yusuf Stacy RN  Outcome: Progressing  Flowsheets (Taken 12/21/2022 1008)  Effect of thought disturbance (confusion, delirium, dementia, or psychosis) are managed with adequate functional status: Assess for contributors to thought disturbance, including medications, impaired vision or hearing, underlying metabolic abnormalities, dehydration, psychiatric diagnoses, notify LIP   Monitoring. Problem: ABCDS Injury Assessment  Goal: Absence of physical injury  12/21/2022 1450 by Yusuf Stacy RN  Outcome: Progressing  Flowsheets (Taken 12/21/2022 1006)  Absence of Physical Injury: Implement safety measures based on patient assessment  12/21/2022 0359 by Delilah Peterson RN  Outcome: Progressing   No injury noted this shift. Problem: Nutrition Deficit:  Goal: Optimize nutritional status  12/21/2022 1450 by Yusuf Stacy RN  Outcome: Progressing  Flowsheets (Taken 12/21/2022 0930 by Devonte Valdez, CASSIUS, LD)  Nutrient intake appropriate for improving, restoring, or maintaining nutritional needs: Monitor oral intake, labs, and treatment plans  12/21/2022 0359 by Delilah Peterson RN  Outcome: Progressing   Pt tolerating po intake this shift.

## 2022-12-21 NOTE — PROGRESS NOTES
[id number]    Acute Rehabilitation Occupational Therapy Daily Treatment Note    Date: 22  Patient Name: Constantin Motley       Room: 5324/5030-90  MRN: [id number]  Account: [de-identified]   : 1955  (78 y.o.) Gender: male       Referring Practitioner: Dilia Pham MD  Diagnosis: Cord compression s/p surgical decompression/fusion L1-4  Additional Pertinent Hx: Patient with spinal cord compression who had L1-4 laminectomy and fusion performed by Dr. Ethel Jewell formarked L1-2 stenosis and degenerative spondylolisthesis at L3-4 on 22. Medical records indicate some post-op confusion. Pt admitted to ARU on 12/15/22. Treatment Diagnosis: Impaired self care status    Past Medical History:  has a past medical history of Anxiety, Asthma, Atrial fibrillation ([address] Back pain, BPH (benign prostatic hyperplasia), Dental disease, Depression, Diarrhea, MALONE (dyspnea on exertion), Frequent falls, GERD (gastroesophageal reflux disease), Memory loss, Obesity, Panic disorder, Parkinson's disease ([address] Testicular cancer ([address] and Visual impairment. Past Surgical History:   has a past surgical history that includes Cholecystectomy; hernia repair; Total knee arthroplasty (Bilateral); lumbar laminectomy; Orchiectomy; brow lift; Carpal tunnel release (Right); Shoulder arthroscopy (Right); Wrist surgery (Left); Colonoscopy; Cystoscopy; brain surgery; TURP; and lymph node dissection. Restrictions  Restrictions/Precautions  Restrictions/Precautions: Surgical Protocols, Fall Risk, General Precautions, Up as Tolerated  Required Braces or Orthoses?: Yes  Implants present? : Metal implants, Deep brain stimulator  Required Braces or Orthoses  Spinal: Lumbar Corset  Position Activity Restriction  Spinal Precautions: Avoid Excessive Bending, Lifting, and Twisting of spine  Other position/activity restrictions: LSO to be donned when OOB; [id number] per Dr. Jose A Johnson to shower POD #7 (Sx date: 22).  PT/OT therapy staff may titrate oxygen down as tolerated in therapy, but may only titrate up to maximum 4 L NC as needed in therapy. Nursing should perform oxygen titration > 4 L. Vitals      Subjective  Subjective  Subjective: AM: \"I'm good. \"- When asked how hes doing today. Motivated to get out of bed and start the day. PM: Pt. resting in recliner chair upon arrival- easy to wake. Cooperative. Pain: Rating as 2-3 in back. Objective  Cognition  Overall Orientation Status: Within Functional Limits  Orientation Level: Oriented X4    Cognition  Overall Cognitive Status: Exceptions  Arousal/Alertness: Delayed responses to stimuli  Following Commands: Follows one step commands with increased time; Follows one step commands with repetition  Attention Span: Difficulty attending to directions  Memory: Decreased short term memory;Decreased recall of precautions;Decreased recall of recent events  Safety Judgement: Decreased awareness of need for assistance;Decreased awareness of need for safety  Problem Solving: Assistance required to identify errors made;Assistance required to correct errors made;Assistance required to implement solutions;Assistance required to generate solutions;Decreased awareness of errors  Insights: Decreased awareness of deficits  Initiation: Requires cues for some  Sequencing: Requires cues for some  Cognition Comment: Pt's Wife Liliam Severe states that pt's cognition and memory is near baseline; Hx of parkinson's disease Dx'd 10-15 years ago    Activities of Daily Living  Feeding  Assistance Level: Modified independent  Skilled Clinical Factors: per spouse and pt report    Grooming/Oral Hygiene  Assistance Level: Stand by assist  Skilled Clinical Factors: Standing at sink for face washing and oral hygiene. Upper Extremity Bathing  Assistance Level: Supervision  Skilled Clinical Factors: Seated on toilet for UB bathing. Minimal verbal cuing for sequencing.     Upper Extremity Dressing  Assistance Level: Minimal assistance  Skilled Clinical Factors: Pt. able to doff and ashley overhead tshirts without assistance. Rajni provided for doffing and donning of KRISTINA- pt is able to assist writer. Putting On/Taking Off Footwear  Skilled Clinical Factors: Wife assisted pt to ashley and tie shoes this AM.    Toileting  Assistance Level: Contact guard assist  Skilled Clinical Factors: Completed during AM and PM sessions. CGA provided during clothing management. Pt. occasional demos posterior lean in which he is able to correct with verbal cuing. Toilet Transfers  Assistance Level: Contact guard assist  Skilled Clinical Factors: Completed during AM and PM session. Uses grab bar safely. Mobility     Supine to Sit  Assistance Level: Minimal assistance  Skilled Clinical Factors: A for trunk  Scooting  Assistance Level: Contact guard assist  Skilled Clinical Factors: hips to EOB    Transfers  Surface: From bed;Standard toilet; Wheelchair; To chair with arms;From chair with arms (Writer assisted nursing staff with pt transfer on/off weight chair during PM session. Pt. required verbal and tactile cuing for proper hand placement and technique.)  Additional Factors: Increased time to complete;Verbal cues  Device: Walker  Sit to Stand  Assistance Level: Minimal assistance  Skilled Clinical Factors: Verbal and tactile cues for proper hand placement. Stand to Sit  Assistance Level: Minimal assistance  Skilled Clinical Factors: Verbal and tactile cues for proper hand placement to ensure safe descend. Bed To/From Chair  Technique: Stand pivot  Assistance Level: Contact guard assist  Skilled Clinical Factors: Verbal and tactile cues for hand placement and appropriate use of rw during pivot. Stand Pivot  Assistance Level: Contact guard assist  Skilled Clinical Factors: Max cues for hand placement and sequencing.     Functional Mobility  Device: Rolling walker  Activity: To/From bathroom  Assistance Level: Minimal assistance  Skilled Clinical Factors: VC for upright posture and wide CLARA during functional mobility. Intermittent Min A for posterior lean. no LOB. OT Exercises  Exercise Treatment: Instruction and participation in B UE str exercises with 2# free wt, 10-15 reps in all planes, rest breaks taken as needed, to continue to increase general str and activity tolerance for maximized indep and safety during all daily self care and leisure activities. Assessment  Assessment  Activity Tolerance: Patient limited by fatigue  Discharge Recommendations: 24 hour supervision or assist;Home with Home health OT    Patient Education  Education  Education Given To: Patient; Family  Education Provided: Role of Therapy;Plan of Care;Precautions; ADL Function;Transfer Training;Cognition  Education Method: Verbal;Demonstration  Barriers to Learning: Cognition  Education Outcome: Continued education needed    OT Equipment Recommendations  ADL Assistive Devices: Sock-Aid Hard;Reacher;Long-handled Sponge;Long-handled Shoe Horn  Other: Pt and pt spouse reports having walk in shower, shower bench, and grab bars in shower and around commode at private residence    [address] [place] [address] in place: Yes  Type of devices: All fall risk precautions in place (Wife present during and after both AM and PM sessions.)     Goals  Patient Goals   Patient goals : \"I would like to be able to get up and go so I can go outside and do some repairs and go downstairs and reload some guerrero. Do the things I normally do. \"  Short Term Goals  Time Frame for Short Term Goals: By 1 week  Short Term Goal 1: Pt will complete upper body dressing/bathing with Min A and Good attention/safety while maintaining back precautions  Short Term Goal 2: Pt will complete lower body dressing/bathing with Mod A and Good safety with use of AE as needed while maintaining back precautions  Short Term Goal 3: Pt will complete funcitonal transfers/mobility during self care tasks with Min A and Good safety with use of least restrictive device  Short Term Goal 4: Pt will tolerate standing 4+ minutes during self care tasks with Min A and Good safety  Short Term Goal 5: Pt will verbalize/demonstrate understanding of back precautions during daily activities 80% accuracy  Short Term Goal 6: Pt will participate in 30+ minutes during therapeutic exercises/functional activities to increase safety and independence with self care and mobility    Long Term Goals  Time Frame for Long Term Goals : By discharge  Long Term Goal 1: Pt will complete BADLs with SBA and Good attention/safety to task while maintaining back precautions  Long Term Goal 2: Pt will complete functional transfers/mobility during self care taks with SBA and Good safety with use of least restrictive device  Long Term Goal 3: Pt will tolerate standing 8+ minutes during self care tasks with SBA and Good safety  Long Term Goal 4: Pt will demonstrate increased [address] and strength during self care tasks as evident by 5# improvement in  strength and 15 second improvement on 9 hole peg test.  Long Term Goal 5: Pt will demonstrate sitting EOB 20+ minutes during self care tasks while maintaining appropriate midline with SBA  Long Term Goal 6: Pt/family will verbalize/demonstrate of home safety/fall prevention strategies to increase safety and independence with self care and mobility    Plan  Occupational Therapy Plan  Times Per Week: 5-7  Times Per Day: Twice a day  Current Treatment Recommendations: Self-Care / ADL, Strengthening, ROM, Balance training, Functional mobility training, Endurance training, Cognitive reorientation, Pain management, Safety education & training, Patient/Caregiver education & training, Equipment evaluation, education, & procurement, Home management training, Coordination training, Cognitive/Perceptual training       12/21/22 1220 12/21/22 1649   OT Individual Minutes   Time In [id number]   [facility] [id number]   Minutes 53 33       Electronically signed by CORONA Garza/L on 12/21/22 at 4:50 PM EST

## 2022-12-22 LAB
INR BLD: 2.8
PROTHROMBIN TIME: 29.4 SEC (ref 11.8–14.6)

## 2022-12-22 PROCEDURE — 97530 THERAPEUTIC ACTIVITIES: CPT

## 2022-12-22 PROCEDURE — 92507 TX SP LANG VOICE COMM INDIV: CPT

## 2022-12-22 PROCEDURE — 6370000000 HC RX 637 (ALT 250 FOR IP): Performed by: PHYSICAL MEDICINE & REHABILITATION

## 2022-12-22 PROCEDURE — 97116 GAIT TRAINING THERAPY: CPT

## 2022-12-22 PROCEDURE — 97129 THER IVNTJ 1ST 15 MIN: CPT

## 2022-12-22 PROCEDURE — 85610 PROTHROMBIN TIME: CPT

## 2022-12-22 PROCEDURE — 99232 SBSQ HOSP IP/OBS MODERATE 35: CPT | Performed by: PHYSICAL MEDICINE & REHABILITATION

## 2022-12-22 PROCEDURE — 1180000000 HC REHAB R&B

## 2022-12-22 PROCEDURE — 6370000000 HC RX 637 (ALT 250 FOR IP): Performed by: STUDENT IN AN ORGANIZED HEALTH CARE EDUCATION/TRAINING PROGRAM

## 2022-12-22 PROCEDURE — 97110 THERAPEUTIC EXERCISES: CPT

## 2022-12-22 PROCEDURE — 97535 SELF CARE MNGMENT TRAINING: CPT

## 2022-12-22 PROCEDURE — 36415 COLL VENOUS BLD VENIPUNCTURE: CPT

## 2022-12-22 RX ADMIN — ESCITALOPRAM OXALATE 20 MG: 10 TABLET ORAL at 08:07

## 2022-12-22 RX ADMIN — DIPHENHYDRAMINE HYDROCHLORIDE, ZINC ACETATE: 2; .1 CREAM TOPICAL at 02:41

## 2022-12-22 RX ADMIN — CARBIDOPA AND LEVODOPA 1 TABLET: 25; 100 TABLET, EXTENDED RELEASE ORAL at 08:07

## 2022-12-22 RX ADMIN — POLYETHYLENE GLYCOL 3350 17 G: 17 POWDER, FOR SOLUTION ORAL at 20:55

## 2022-12-22 RX ADMIN — CARBIDOPA AND LEVODOPA 1 TABLET: 25; 100 TABLET, EXTENDED RELEASE ORAL at 20:57

## 2022-12-22 RX ADMIN — POLYETHYLENE GLYCOL 3350 17 G: 17 POWDER, FOR SOLUTION ORAL at 08:06

## 2022-12-22 RX ADMIN — RASAGILINE 1 MG: 1 TABLET ORAL at 08:07

## 2022-12-22 RX ADMIN — CARBIDOPA AND LEVODOPA 1 TABLET: 25; 100 TABLET ORAL at 20:57

## 2022-12-22 RX ADMIN — CARBIDOPA AND LEVODOPA 1 TABLET: 25; 100 TABLET ORAL at 08:07

## 2022-12-22 RX ADMIN — SENNOSIDES 17.2 MG: 8.6 TABLET, FILM COATED ORAL at 20:57

## 2022-12-22 ASSESSMENT — ENCOUNTER SYMPTOMS
GASTROINTESTINAL NEGATIVE: 1
RESPIRATORY NEGATIVE: 1
EYES NEGATIVE: 1
ALLERGIC/IMMUNOLOGIC NEGATIVE: 1

## 2022-12-22 NOTE — PROGRESS NOTES
Patient returned from appointment, staples removed from incision on back, island dressing applied d/t small of bleeding. Patient doing well, denies any pain or discomfort.

## 2022-12-22 NOTE — PLAN OF CARE
Problem: Discharge Planning  Goal: Discharge to home or other facility with appropriate resources  12/22/2022 0435 by Glenis Apple RN  Outcome: Progressing     Problem: Skin/Tissue Integrity  Goal: Absence of new skin breakdown  Description: 1. Monitor for areas of redness and/or skin breakdown  2. Assess vascular access sites hourly  3. Every 4-6 hours minimum:  Change oxygen saturation probe site  4. Every 4-6 hours:  If on nasal continuous positive airway pressure, respiratory therapy assess nares and determine need for appliance change or resting period. 12/22/2022 0435 by Glenis Apple RN  Outcome: Progressing     Problem: Safety - Adult  Goal: Free from fall injury  12/22/2022 0435 by Glenis Apple RN  Outcome: Progressing     Problem: Pain  Goal: Verbalizes/displays adequate comfort level or baseline comfort level  12/22/2022 0435 by Glenis Apple RN  Outcome: Progressing     Problem: Confusion  Goal: Confusion, delirium, dementia, or psychosis is improved or at baseline  Description: INTERVENTIONS:  1. Assess for possible contributors to thought disturbance, including medications, impaired vision or hearing, underlying metabolic abnormalities, dehydration, psychiatric diagnoses, and notify attending LIP  2. [facility] high risk fall precautions, as indicated  3. Provide frequent short contacts to provide reality reorientation, refocusing and direction  4. Decrease environmental stimuli, including noise as appropriate  5. Monitor and intervene to maintain adequate nutrition, hydration, elimination, sleep and activity  6. If unable to ensure safety without constant attention obtain sitter and review sitter guidelines with assigned personnel  7.  Initiate Psychosocial CNS and Spiritual Care consult, as indicated  12/22/2022 0435 by Glenis Apple RN  Outcome: Progressing     Problem: ABCDS Injury Assessment  Goal: Absence of physical injury  12/22/2022 0435 by Glenis Apple RN  Outcome: Progressing     Problem: Nutrition Deficit:  Goal: Optimize nutritional status  12/22/2022 0435 by Prabhu Pelaez RN  Outcome: Progressing

## 2022-12-22 NOTE — PROGRESS NOTES
Speech Language Pathology  Speech Language Pathology  [facility] Acute Rehab Unit at [facility]    Speech Language/cognitive Treatment Note    Date: 12/22/2022  Patients Name: Taylor Angel  MRN: [id number]  Diagnosis:   Patient Active Problem List   Diagnosis Code    Atrial fibrillation [facility]) I48.91    Chronic back pain M54.9, G89.29    Erectile dysfunction N52.9    Gait disorder R26.9    Hypertension I10    Lower urinary tract symptoms R39.9    Malignant neoplasm of testis (HCC) C62.90    Osteoarthrosis M19.90    Parkinson disease ([address] G20    Peripheral nerve disease G62.9    Cord compression ([address] G95.20    Moderate malnutrition ([address] E44.0       Pain: none reported    Speech and Language Treatment  Treatment time: [id number]    Subjective: [x] Alert [x] Cooperative     [] Confused     [] Agitated    [] Lethargic      Objective/Assessment:    Speech: Pt. Demonstrated moderate dysarthria characterized by rapid rate of speech, imprecise articulation and reduced intensity. Intelligibility rated at 60%. Pt. Anthony Held. Re: compensatory dysarthria strategies and able to repeat   short sentences given model 100% of the time, pt. Having difficulty carrying over into conversation/structured tasks, requiring mod A. Cognition: image retention (15 min delay)- 50%, 60% c cues. Name 12 items in category- 75%, 100% c cues. Pt. Demonstrated prolonged response latency. Orientation- 75%    Pt. Wife present. Plan:  [x] Continue ST services    [] Discharge from ST:      Discharge recommendations: []  Further therapy recommended at discharge. The patient should be able to tolerate at least 3 hours of therapy per day over 5 days or 15 hours over 7 days. [] Further therapy recommended at discharge. [] No therapy recommended at discharge.       Treatment completed by: FOREST Kelly, M.A., [id number] [facility]

## 2022-12-22 NOTE — PROGRESS NOTES
[facility] Internal Medicine  Jared Doss MD; Jen Valverde MD; Radha Veras MD; MD Pepper Barahona MD; MD LOLY Amador [facility] Internal Medicine   [facility]    HISTORY AND PHYSICAL EXAMINATION            Date:   12/22/2022  Patient name:  Alejandro Lucero  Date of admission:  12/15/2022  5:57 PM  MRN:   [id number]  Account:  [de-identified]  YOB: 1955  PCP:    Troy Singh MD  Room:   [id number]  Code Status:    Full Code    Chief Complaint:       Back pain from spinal stenosis s/p laminectomy    History Obtained From:     Patient and electronic medical record    History of Present Illness:     Alejandro Lucero is a 79 y.o. Unavailable / unknown male who has past medical history of a . Fib and parkinson presents with back pain from spinal cord compression s/p laminectomy. He is admitted for acute rehabilitation. Laminectomy done at [facility] 1 week back. Currently patient denied back pain. He dose have weakness in bilateral lower extremity 4/5.      Past Medical History:     Past Medical History:   Diagnosis Date    Anxiety     Asthma     Atrial fibrillation (HCC)     Back pain     BPH (benign prostatic hyperplasia)     Dental disease     Depression     Diarrhea     MALONE (dyspnea on exertion)     Frequent falls     GERD (gastroesophageal reflux disease)     Memory loss     Obesity     Panic disorder     Parkinson's disease ([address]     Testicular cancer ([address]     Visual impairment         Past Surgical History:     Past Surgical History:   Procedure Laterality Date    BRAIN SURGERY      Neurostimulator implant for Parkinson's    BROW LIFT      CARPAL TUNNEL RELEASE Right     CHOLECYSTECTOMY      COLONOSCOPY      CYSTOSCOPY      HERNIA REPAIR      LUMBAR LAMINECTOMY      LYMPH NODE DISSECTION      ORCHIECTOMY      SHOULDER ARTHROSCOPY Right     TOTAL KNEE ARTHROPLASTY Bilateral     TURP      WRIST SURGERY Left     cyst removal        Medications Prior to Admission:     Prior to Admission medications    Not on File        Allergies:     Patient has no known allergies. Social History:     Tobacco:    reports that he has never smoked. He has never used smokeless tobacco.  Alcohol:      has no history on file for alcohol use. Drug Use:  has no history on file for drug use. Family History:     Family History   Problem Relation Age of Onset    Other Mother     Heart Failure Father     Other Sister     Behavior Problems Sister     No Known Problems Brother        Review of Systems:     Review of Systems   Constitutional: Negative. HENT: Negative. Eyes: Negative. Respiratory: Negative. Cardiovascular: Negative. Gastrointestinal: Negative. Endocrine: Negative. Genitourinary: Negative. Musculoskeletal: Negative. Bilateral lower extremity weakness. Skin: Negative. Allergic/Immunologic: Negative. Neurological: Negative. Hematological: Negative. Psychiatric/Behavioral: Negative. Michela Gama Physical Exam:     Physical Exam   Vitals:    Vitals:    22 0714 22 1321 22 1851 22 0643   BP: (!) 159/108  (!) 144/87 (!) 148/80   Pulse: 74  92 80   Resp: 16  18 16   Temp: 97.7 °F (36.5 °C)  98.2 °F (36.8 °C) 97.2 °F (36.2 °C)   TempSrc: Oral  Oral    SpO2: 95%  98% 96%   Weight:  241 lb 8 oz (109.5 kg)     Height:                        Body mass index is 32.75 kg/m². Temp (24hrs), Av.7 °F (36.5 °C), Min:97.2 °F (36.2 °C), Max:98.2 °F (36.8 °C)    No results for input(s): POCGLU in the last 72 hours. General Appearance:   well apearing             Skin:                             No rash or erythema  HEENT ;                                                                       No icterus, no pallor . No ptosis.     No gross asymmetry  Or abnormality face         Neck:                            No mass , no thyroid enlargement                                           Pulmonary/Chest:                                               Symmetric                                          Clear to auscultation bilaterally . No wheezes,                                          No rales or rhonchi . No abnormality on percussion                                                        Cardiovascular:            Normal rate, regular rhythm,                                          No murmur or  Gallop . Abdomen:                       Soft, non-tender                                           Normal bowels sounds,                                             Extremities:                    normal sensation.  Power 4/5 in bilateral lower extremities                                           Musculo-skeletal / Neurological ;;                                                                                               Investigations:      URINE ANALYSIS: No results found for: LABURIN     CBC:  Lab Results   Component Value Date/Time    WBC 7.0 12/16/2022 06:21 AM    HGB 12.5 12/16/2022 06:21 AM     12/16/2022 06:21 AM             BMP:    Lab Results   Component Value Date/Time     12/16/2022 06:21 AM    K 3.9 12/16/2022 06:21 AM     12/16/2022 06:21 AM    CO2 25 12/16/2022 06:21 AM    BUN 23 12/16/2022 06:21 AM    CREATININE 0.80 12/16/2022 06:21 AM    GLUCOSE 95 12/16/2022 06:21 AM      LIVER PROFILE:  Lab Results   Component Value Date/Time    ALT 44 12/16/2022 06:21 AM    AST 35 12/16/2022 06:21 AM    PROT 7.0 12/16/2022 06:21 AM    BILITOT 0.7 12/16/2022 06:21 AM    BILIDIR 0.2 12/16/2022 06:21 AM    LABALBU 4.0 12/16/2022 06:21 AM             @BRIEFLABT(TSH)@      Laboratory Testing:  Recent Results (from the past 24 hour(s))   Protime-INR    Collection Time: 12/22/22  7:42 AM   Result Value Ref Range Protime 29.4 (H) 11.8 - 14.6 sec    INR 2.8          Imaging/Diagnostics:  No results found. Assessment :      Hospital Problems             Last Modified POA    * (Principal) Cord compression ([facility] 12/15/2022 Yes    Moderate malnutrition ([facility] 12/16/2022 Yes   Taylor Angel is a 79 y.o. Unavailable / unknown male who has past medical history of a . Fib and parkinson presents with back pain from spinal cord compression s/p laminectomy. He is admitted for acute rehabilitation. Laminectomy done at [facility] 1 week back. Plan:       Medications: Allergies:  No Known Allergies    Current Meds:   Scheduled Meds:    senna  2 tablet Oral Nightly    polyethylene glycol  17 g Oral BID    Opicapone  50 mg Oral Nightly    mirabegron  50 mg Oral Nightly    carbidopa-levodopa  1 tablet Oral BID    carbidopa-levodopa  1 tablet Oral BID    escitalopram  20 mg Oral Daily    rasagiline mesylate  1 mg Oral Daily    warfarin placeholder: dosing by pharmacy   Other RX Placeholder     Continuous Infusions:   PRN Meds: oxyCODONE-acetaminophen, diphenhydrAMINE-zinc acetate, tiZANidine, acetaminophen, bisacodyl          12/22/2022    Admitted for acute rehab after laminectomy for spinal stenosis   Hemodynamically stable, saturating well in room air. Continue physical therapy and occupation therapy. 12/19/22    Patient tolerating rehabilitative therapies . Progressing fairly well . Continue present therapy . 12/21  Patient advancing well with physical therapy  Hypertension, controlled  On Coumadin,last  INR is 2.8  History of Parkinson disease on Sinemet  Family very  happy with his recovery     MD LOLY Dial [address], [address] [place] [address].    Phone [phone]   Fax: [phone]  Answering Service: [phone]            12/22/2022  3:51 PM    Copy sent to Dr. Linda Vicente MD    Please note that this chart was generated using voice recognition Dragon dictation software. Although every effort was made to ensure the accuracy of this automated transcription, some errors in transcription may have occurred.

## 2022-12-22 NOTE — PLAN OF CARE
Problem: Discharge Planning  Goal: Discharge to home or other facility with appropriate resources  12/22/2022 1555 by Easton Xiong LPN  Outcome: Not Progressing     Problem: Skin/Tissue Integrity  Goal: Absence of new skin breakdown  Description: 1. Monitor for areas of redness and/or skin breakdown  2. Assess vascular access sites hourly  3. Every 4-6 hours minimum:  Change oxygen saturation probe site  4. Every 4-6 hours:  If on nasal continuous positive airway pressure, respiratory therapy assess nares and determine need for appliance change or resting period. 12/22/2022 1555 by Easton Xiong LPN  Outcome: Not Progressing     Problem: Safety - Adult  Goal: Free from fall injury  12/22/2022 1555 by Easton Xiong LPN  Outcome: Not Progressing     Problem: Pain  Goal: Verbalizes/displays adequate comfort level or baseline comfort level  12/22/2022 1555 by Easton Xiong LPN  Outcome: Not Progressing     Problem: Confusion  Goal: Confusion, delirium, dementia, or psychosis is improved or at baseline  Description: INTERVENTIONS:  1. Assess for possible contributors to thought disturbance, including medications, impaired vision or hearing, underlying metabolic abnormalities, dehydration, psychiatric diagnoses, and notify attending LIP  2. [place] high risk fall precautions, as indicated  3. Provide frequent short contacts to provide reality reorientation, refocusing and direction  4. Decrease environmental stimuli, including noise as appropriate  5. Monitor and intervene to maintain adequate nutrition, hydration, elimination, sleep and activity  6. If unable to ensure safety without constant attention obtain sitter and review sitter guidelines with assigned personnel  7.  Initiate Psychosocial CNS and Spiritual Care consult, as indicated  12/22/2022 1555 by Easton Xiong LPN  Outcome: Not Progressing     Problem: ABCDS Injury Assessment  Goal: Absence of physical injury  12/22/2022 1555 by Brittani Goldsmith Ab Grover LPN  Outcome: Not Progressing     Problem: Nutrition Deficit:  Goal: Optimize nutritional status  12/22/2022 1555 by Liza Villa LPN  Outcome: Not Progressing     Problem: Discharge Planning  Goal: Discharge to home or other facility with appropriate resources  12/22/2022 1554 by Liza Villa LPN  Outcome: Not Progressing  Flowsheets (Taken 12/22/2022 5500)  Discharge to home or other facility with appropriate resources: Identify barriers to discharge with patient and caregiver  12/22/2022 0435 by Hilary Fallon RN  Outcome: Progressing     Problem: Skin/Tissue Integrity  Goal: Absence of new skin breakdown  Description: 1. Monitor for areas of redness and/or skin breakdown  2. Assess vascular access sites hourly  3. Every 4-6 hours minimum:  Change oxygen saturation probe site  4. Every 4-6 hours:  If on nasal continuous positive airway pressure, respiratory therapy assess nares and determine need for appliance change or resting period. 12/22/2022 1554 by Liza Villa LPN  Outcome: Not Progressing  12/22/2022 0435 by Hilary Fallon RN  Outcome: Progressing     Problem: Safety - Adult  Goal: Free from fall injury  12/22/2022 1554 by Liza Villa LPN  Outcome: Not Progressing  12/22/2022 0435 by Hilary Fallon RN  Outcome: Progressing     Problem: Pain  Goal: Verbalizes/displays adequate comfort level or baseline comfort level  12/22/2022 1554 by Liza Villa LPN  Outcome: Not Progressing  12/22/2022 0435 by Hilary Fallon RN  Outcome: Progressing     Problem: Confusion  Goal: Confusion, delirium, dementia, or psychosis is improved or at baseline  Description: INTERVENTIONS:  1. Assess for possible contributors to thought disturbance, including medications, impaired vision or hearing, underlying metabolic abnormalities, dehydration, psychiatric diagnoses, and notify attending LIP  2. [place] high risk fall precautions, as indicated  3.  Provide frequent short contacts to provide reality reorientation, refocusing and direction  4. Decrease environmental stimuli, including noise as appropriate  5. Monitor and intervene to maintain adequate nutrition, hydration, elimination, sleep and activity  6. If unable to ensure safety without constant attention obtain sitter and review sitter guidelines with assigned personnel  7.  Initiate Psychosocial CNS and Spiritual Care consult, as indicated  12/22/2022 1554 by Mariana Edmonds LPN  Outcome: Not Progressing  Flowsheets (Taken 12/22/2022 7864)  Effect of thought disturbance (confusion, delirium, dementia, or psychosis) are managed with adequate functional status:   Assess for contributors to thought disturbance, including medications, impaired vision or hearing, underlying metabolic abnormalities, dehydration, psychiatric diagnoses, notify LIP   Monitor and intervene to maintain adequate nutrition, hydration, elimination, sleep and activity  12/22/2022 0435 by Eva Isabel RN  Outcome: Progressing     Problem: ABCDS Injury Assessment  Goal: Absence of physical injury  12/22/2022 1554 by Mariana Edmonds LPN  Outcome: Not Progressing  Flowsheets (Taken 12/22/2022 1021)  Absence of Physical Injury: Implement safety measures based on patient assessment  12/22/2022 0435 by Eva Isabel RN  Outcome: Progressing     Problem: Nutrition Deficit:  Goal: Optimize nutritional status  12/22/2022 1554 by Mariana Edmonds LPN  Outcome: Not Progressing  12/22/2022 0435 by Eva Isabel RN  Outcome: Progressing

## 2022-12-22 NOTE — CARE COORDINATION
ONGOING ARU DISCHARGE PLAN:    Patient is alert and oriented x4. Spoke with patient regarding discharge plan and patient confirms plan is to go home with wife and OP Therapy/ patient     DME: Walker/ folding walker Adult size ordered through [facility], yesterday    Will continue to follow for additional discharge needs.     Electronically signed by Elaine Mao RN on 12/22/2022 at 8:35 AM

## 2022-12-22 NOTE — PROGRESS NOTES
[id number]    Acute Rehabilitation Occupational Therapy Daily Treatment Note    Date: 22  Patient Name: Camila Jorgensen       Room: [facility]/1866-25  MRN: [id number]  Account: [de-identified]   : 1955  (78 y.o.) Gender: male       Referring Practitioner: Mika Rodas MD  Diagnosis: Cord compression s/p surgical decompression/fusion L1-4  Additional Pertinent Hx: Patient with spinal cord compression who had L1-4 laminectomy and fusion performed by Dr. Inocencio House formarked L1-2 stenosis and degenerative spondylolisthesis at L3-4 on 22. Medical records indicate some post-op confusion. Pt admitted to ARU on 12/15/22. Treatment Diagnosis: Impaired self care status    Past Medical History:  has a past medical history of Anxiety, Asthma, Atrial fibrillation ([address] Back pain, BPH (benign prostatic hyperplasia), Dental disease, Depression, Diarrhea, MALONE (dyspnea on exertion), Frequent falls, GERD (gastroesophageal reflux disease), Memory loss, Obesity, Panic disorder, Parkinson's disease ([address] Testicular cancer ([address] and Visual impairment. Past Surgical History:   has a past surgical history that includes Cholecystectomy; hernia repair; Total knee arthroplasty (Bilateral); lumbar laminectomy; Orchiectomy; brow lift; Carpal tunnel release (Right); Shoulder arthroscopy (Right); Wrist surgery (Left); Colonoscopy; Cystoscopy; brain surgery; TURP; and lymph node dissection. Restrictions  Restrictions/Precautions  Restrictions/Precautions: Surgical Protocols, Fall Risk, General Precautions, Up as Tolerated  Required Braces or Orthoses?: Yes  Implants present? : Metal implants, Deep brain stimulator  Required Braces or Orthoses  Spinal: Lumbar Corset  Position Activity Restriction  Spinal Precautions: Avoid Excessive Bending, Lifting, and Twisting of spine  Other position/activity restrictions: LSO to be donned when OOB; [id number] per Dr. Keyona Rutherford to shower POD #7 (Sx date: 22).  PT/OT therapy staff may titrate oxygen down as tolerated in therapy, but may only titrate up to maximum 4 L NC as needed in therapy. Nursing should perform oxygen titration > 4 L. Vitals      Subjective  Subjective  Subjective: AM: Pt. up in recliner chair in room upon arrival. Pleasant. Cooperative. PM: Pt. in wheelchair upon arrival. Agreeable for session. Pain: No c/o any pain at this time. Objective  Cognition  Overall Orientation Status: Within Functional Limits  Orientation Level: Oriented X4    Cognition  Overall Cognitive Status: Exceptions  Arousal/Alertness: Delayed responses to stimuli  Following Commands: Follows one step commands with increased time; Follows one step commands with repetition  Attention Span: Difficulty attending to directions  Memory: Decreased short term memory;Decreased recall of precautions;Decreased recall of recent events  Safety Judgement: Decreased awareness of need for assistance;Decreased awareness of need for safety  Problem Solving: Assistance required to identify errors made;Assistance required to correct errors made;Assistance required to implement solutions;Assistance required to generate solutions;Decreased awareness of errors  Insights: Decreased awareness of deficits  Initiation: Requires cues for some  Sequencing: Requires cues for some  Cognition Comment: Pt's Wife Elroy Bonilla states that pt's cognition and memory is near baseline; Hx of parkinson's disease Dx'd 10-15 years ago    Activities of Daily Living  Feeding  Assistance Level: Modified independent  Skilled Clinical Factors: per spouse and pt report    Grooming/Oral Hygiene  Assistance Level: Stand by assist  Skilled Clinical Factors: Standing at sink for face washing and oral hygiene. Upper Extremity Bathing  Assistance Level: Supervision  Skilled Clinical Factors: Seated on toilet for UB bathing. Minimal verbal cuing for sequencing.     Upper Extremity Dressing  Assistance Level: Minimal assistance  Skilled Clinical Factors: Pt. able to doff and ashley overhead tshirts without assistance. Rajni provided for doffing and donning of KRISTINA- pt is able to assist writer. Lower Extremity Dressing  Assistance Level: Moderate assistance  Skilled Clinical Factors: intermittent A using reaching with threading pants to avoid excess bending, Pt able to manage up/down over hips while standing at the sink with CGA and VC for straightening B knees. Putting On/Taking Off Footwear  Assistance Level: Maximum assistance    Toileting  Assistance Level: Contact guard assist  Skilled Clinical Factors: Completed during AM and PM sessions. CGA provided during clothing management. Pt. occasional demos posterior lean in which he is able to correct with verbal cuing. Toilet Transfers  Technique:  (Ambulating with rw.)  Assistance Level: Contact guard assist  Skilled Clinical Factors: Completed during AM and PM session. Uses grab bar safely. Mobility     Sit to Stand  Assistance Level: Contact guard assist  Skilled Clinical Factors: Verbal and tactile cues for proper hand placement. Stand to Sit  Assistance Level: Contact guard assist  Skilled Clinical Factors: Verbal and tactile cues for proper hand placement to ensure safe descend. Bed To/From Chair  Technique: Stand pivot  Assistance Level: Contact guard assist  Skilled Clinical Factors: Verbal and tactile cues for hand placement and appropriate use of rw during pivot. Stand Pivot  Assistance Level: Contact guard assist  Skilled Clinical Factors: Max cues for hand placement and sequencing. Functional Mobility  Device: Rolling walker  Activity: To/From bathroom  Assistance Level: Minimal assistance  Skilled Clinical Factors: VC for upright posture and wide CLARA during functional mobility. Intermittent Min A for posterior lean. no LOB.     OT Exercises  Exercise Treatment: Instruction and participation in B UE str exercises with 2# free wt, 10-15 reps in all planes, rest breaks taken as needed, to continue to increase general str and activity tolerance for maximized indep and safety during all daily self care and leisure activities. Motor Control/Coordination: Instruction and participation in item retreival and placement activity- reaching for/manipulating/placing clips of different resistance levels, in different planes, alternating use of each hand to continue to improve general control and coordination to allow for most effective daily self care and leisure performances. Participation in functional fine motor and  str activity- challenging patient to open/close a variety of frequently used daily items/containers- all to further improve his daily participation in self care and leisure. Assessment  Assessment  Activity Tolerance: Patient limited by fatigue  Discharge Recommendations: 24 hour supervision or assist;Home with Home health OT    Patient Education  Education  Education Given To: Patient; Family  Education Provided: Role of Therapy;Plan of Care;Precautions; ADL Function;Transfer Training;Cognition  Education Method: Verbal;Demonstration  Barriers to Learning: Cognition  Education Outcome: Continued education needed    OT Equipment Recommendations  ADL Assistive Devices: Sock-Aid Hard;Reacher;Long-handled Sponge;Long-handled Shoe Horn  Other: Pt and pt spouse reports having walk in shower, shower bench, and grab bars in shower and around commode at private residence    [address] [place] [address] in place: Yes  Type of devices: All fall risk precautions in place     Goals  Patient Goals   Patient goals : \"I would like to be able to get up and go so I can go outside and do some repairs and go downstairs and reload some guerrero. Do the things I normally do. \"  Short Term Goals  Time Frame for Short Term Goals: By 1 week  Short Term Goal 1: Pt will complete upper body dressing/bathing with Min A and Good attention/safety while maintaining back precautions  Short Term Goal 2: Pt will complete lower body dressing/bathing with Mod A and Good safety with use of AE as needed while maintaining back precautions  Short Term Goal 3: Pt will complete funcitonal transfers/mobility during self care tasks with Min A and Good safety with use of least restrictive device  Short Term Goal 4: Pt will tolerate standing 4+ minutes during self care tasks with Min A and Good safety  Short Term Goal 5: Pt will verbalize/demonstrate understanding of back precautions during daily activities 80% accuracy  Short Term Goal 6: Pt will participate in 30+ minutes during therapeutic exercises/functional activities to increase safety and independence with self care and mobility    Long Term Goals  Time Frame for Long Term Goals : By discharge  Long Term Goal 1: Pt will complete BADLs with SBA and Good attention/safety to task while maintaining back precautions  Long Term Goal 2: Pt will complete functional transfers/mobility during self care taks with SBA and Good safety with use of least restrictive device  Long Term Goal 3: Pt will tolerate standing 8+ minutes during self care tasks with SBA and Good safety  Long Term Goal 4: Pt will demonstrate increased FMC and strength during self care tasks as evident by 5# improvement in  strength and 15 second improvement on 9 hole peg test.  Long Term Goal 5: Pt will demonstrate sitting EOB 20+ minutes during self care tasks while maintaining appropriate midline with SBA  Long Term Goal 6: Pt/family will verbalize/demonstrate of home safety/fall prevention strategies to increase safety and independence with self care and mobility    Plan  Occupational Therapy Plan  Times Per Week: 5-7  Times Per Day: Twice a day  Current Treatment Recommendations: Self-Care / ADL, Strengthening, ROM, Balance training, Functional mobility training, Endurance training, Cognitive reorientation, Pain management, Safety education & training, Patient/Caregiver education & training, Equipment evaluation, education, & procurement, Home management training, Coordination training, Cognitive/Perceptual training         12/22/22 0946 12/22/22 1636   OT Individual Minutes   Time In 0220 5542   Time Out 0900 2640   Minutes 62 33       Electronically signed by KIM Elaine on 12/22/22 at 4:37 PM EST
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
## 2022-12-22 NOTE — PROGRESS NOTES
[facility] Internal Medicine  Niurka Deluna MD; Don Soriano MD; Giovanni Singh MD; Winnie Closs, MD Loreen Lords, MD; MD LOLY Santana [facility] Internal Medicine   [facility]    HISTORY AND PHYSICAL EXAMINATION            Date:   12/21/2022  Patient name:  Camila Jorgensen  Date of admission:  12/15/2022  5:57 PM  MRN:   [id number]  Account:  [de-identified]  YOB: 1955  PCP:    Blondell Eisenmenger, MD  Room:   9556/8428-98  Code Status:    Full Code    Chief Complaint:       Back pain from spinal stenosis s/p laminectomy    History Obtained From:     Patient and electronic medical record    History of Present Illness:     Camila Jorgensen is a 79 y.o. Unavailable / unknown male who has past medical history of a . Fib and parkinson presents with back pain from spinal cord compression s/p laminectomy. He is admitted for acute rehabilitation. Laminectomy done at [facility] 1 week back. Currently patient denied back pain. He dose have weakness in bilateral lower extremity 4/5.      Past Medical History:     Past Medical History:   Diagnosis Date    Anxiety     Asthma     Atrial fibrillation (HCC)     Back pain     BPH (benign prostatic hyperplasia)     Dental disease     Depression     Diarrhea     MALONE (dyspnea on exertion)     Frequent falls     GERD (gastroesophageal reflux disease)     Memory loss     Obesity     Panic disorder     Parkinson's disease ([address]     Testicular cancer ([address]     Visual impairment         Past Surgical History:     Past Surgical History:   Procedure Laterality Date    BRAIN SURGERY      Neurostimulator implant for Parkinson's    BROW LIFT      CARPAL TUNNEL RELEASE Right     CHOLECYSTECTOMY      COLONOSCOPY      CYSTOSCOPY      HERNIA REPAIR      LUMBAR LAMINECTOMY      LYMPH NODE DISSECTION      ORCHIECTOMY      SHOULDER ARTHROSCOPY Right     TOTAL KNEE ARTHROPLASTY Bilateral     TURP      WRIST SURGERY Left     cyst removal        Medications Prior to Admission:     Prior to Admission medications    Not on File        Allergies:     Patient has no known allergies. Social History:     Tobacco:    reports that he has never smoked. He has never used smokeless tobacco.  Alcohol:      has no history on file for alcohol use. Drug Use:  has no history on file for drug use. Family History:     Family History   Problem Relation Age of Onset    Other Mother     Heart Failure Father     Other Sister     Behavior Problems Sister     No Known Problems Brother        Review of Systems:     Review of Systems   Constitutional: Negative. HENT: Negative. Eyes: Negative. Respiratory: Negative. Cardiovascular: Negative. Gastrointestinal: Negative. Endocrine: Negative. Genitourinary: Negative. Musculoskeletal: Negative. Bilateral lower extremity weakness. Skin: Negative. Allergic/Immunologic: Negative. Neurological: Negative. Hematological: Negative. Psychiatric/Behavioral: Negative. Manny Sylvain Physical Exam:     Physical Exam   Vitals:    Vitals:    22 2157 22 0714 22 1321 22 1851   BP:  (!) 159/108  (!) 144/87   Pulse: 65 74  92   Resp:  16  18   Temp:  97.7 °F (36.5 °C)  98.2 °F (36.8 °C)   TempSrc:  Oral  Oral   SpO2:  95%  98%   Weight:   241 lb 8 oz (109.5 kg)    Height:                        Body mass index is 32.75 kg/m². Temp (24hrs), Av °F (36.7 °C), Min:97.7 °F (36.5 °C), Max:98.2 °F (36.8 °C)    No results for input(s): POCGLU in the last 72 hours. General Appearance:   well apearing             Skin:                             No rash or erythema  HEENT ;                                                                       No icterus, no pallor . No ptosis.     No gross asymmetry  Or abnormality face         Neck:                            No mass , no thyroid enlargement Pulmonary/Chest:                                               Symmetric                                          Clear to auscultation bilaterally . No wheezes,                                          No rales or rhonchi . No abnormality on percussion                                                        Cardiovascular:            Normal rate, regular rhythm,                                          No murmur or  Gallop . Abdomen:                       Soft, non-tender                                           Normal bowels sounds,                                             Extremities:                    normal sensation. Power 4/5 in bilateral lower extremities                                           Musculo-skeletal / Neurological ;;                                                                                               Investigations:      URINE ANALYSIS: No results found for: LABURIN     CBC:  Lab Results   Component Value Date/Time    WBC 7.0 12/16/2022 06:21 AM    HGB 12.5 12/16/2022 06:21 AM     12/16/2022 06:21 AM             BMP:    Lab Results   Component Value Date/Time     12/16/2022 06:21 AM    K 3.9 12/16/2022 06:21 AM     12/16/2022 06:21 AM    CO2 25 12/16/2022 06:21 AM    BUN 23 12/16/2022 06:21 AM    CREATININE 0.80 12/16/2022 06:21 AM    GLUCOSE 95 12/16/2022 06:21 AM      LIVER PROFILE:  Lab Results   Component Value Date/Time    ALT 44 12/16/2022 06:21 AM    AST 35 12/16/2022 06:21 AM    PROT 7.0 12/16/2022 06:21 AM    BILITOT 0.7 12/16/2022 06:21 AM    BILIDIR 0.2 12/16/2022 06:21 AM    LABALBU 4.0 12/16/2022 06:21 AM             @BRIEFLABT(TSH)@      Laboratory Testing:  No results found for this or any previous visit (from the past 24 hour(s)). Imaging/Diagnostics:  No results found.     Assessment :      Hospital Problems Last Modified POA    * (Principal) Cord compression ([facility] 12/15/2022 Yes    Moderate malnutrition ([facility] 12/16/2022 Yes   Marvin Douglass is a 79 y.o. Unavailable / unknown male who has past medical history of a . Fib and parkinson presents with back pain from spinal cord compression s/p laminectomy. He is admitted for acute rehabilitation. Laminectomy done at [facility] 1 week back. Plan:       Medications: Allergies:  No Known Allergies    Current Meds:   Scheduled Meds:    senna  2 tablet Oral Nightly    polyethylene glycol  17 g Oral BID    Opicapone  50 mg Oral Nightly    mirabegron  50 mg Oral Nightly    carbidopa-levodopa  1 tablet Oral BID    carbidopa-levodopa  1 tablet Oral BID    escitalopram  20 mg Oral Daily    rasagiline mesylate  1 mg Oral Daily    warfarin placeholder: dosing by pharmacy   Other RX Placeholder     Continuous Infusions:   PRN Meds: oxyCODONE-acetaminophen, diphenhydrAMINE-zinc acetate, tiZANidine, acetaminophen, bisacodyl          12/21/2022    Admitted for acute rehab after laminectomy for spinal stenosis   Hemodynamically stable, saturating well in room air. Continue physical therapy and occupation therapy. 12/19/22    Patient tolerating rehabilitative therapies . Progressing fairly well . Continue present therapy . 12/21  Patient advancing well with physical therapy  Hypertension, controlled  On Coumadin,last  INR is 2.4  History of Parkinson disease on Sinemet      MD LOLY Ramey [address], [address] [place] [address]. Phone [phone]   Fax: [phone]  Answering Service: [phone]            12/21/2022  8:23 PM    Copy sent to Dr. Martin Young MD    Please note that this chart was generated using voice recognition Dragon dictation software. Although every effort was made to ensure the accuracy of this automated transcription, some errors in transcription may have occurred.

## 2022-12-23 LAB
ABSOLUTE EOS #: 0.2 K/UL (ref 0–0.4)
ABSOLUTE LYMPH #: 1.7 K/UL (ref 1–4.8)
ABSOLUTE MONO #: 0.5 K/UL (ref 0.1–1.3)
ANION GAP SERPL CALCULATED.3IONS-SCNC: 10 MMOL/L (ref 9–17)
BASOPHILS # BLD: 3 % (ref 0–2)
BASOPHILS ABSOLUTE: 0.1 K/UL (ref 0–0.2)
BUN BLDV-MCNC: 22 MG/DL (ref 8–23)
CALCIUM SERPL-MCNC: 9 MG/DL (ref 8.6–10.4)
CHLORIDE BLD-SCNC: 105 MMOL/L (ref 98–107)
CO2: 25 MMOL/L (ref 20–31)
CREAT SERPL-MCNC: 0.76 MG/DL (ref 0.7–1.2)
EOSINOPHILS RELATIVE PERCENT: 4 % (ref 0–4)
GFR SERPL CREATININE-BSD FRML MDRD: >60 ML/MIN/1.73M2
GLUCOSE BLD-MCNC: 93 MG/DL (ref 70–99)
HCT VFR BLD CALC: 35.1 % (ref 41–53)
HEMOGLOBIN: 12 G/DL (ref 13.5–17.5)
INR BLD: 2.4
LYMPHOCYTES # BLD: 33 % (ref 24–44)
MCH RBC QN AUTO: 33.5 PG (ref 26–34)
MCHC RBC AUTO-ENTMCNC: 34.2 G/DL (ref 31–37)
MCV RBC AUTO: 98.2 FL (ref 80–100)
MONOCYTES # BLD: 9 % (ref 1–7)
PDW BLD-RTO: 14.3 % (ref 11.5–14.9)
PLATELET # BLD: 412 K/UL (ref 150–450)
PMV BLD AUTO: 7.2 FL (ref 6–12)
POTASSIUM SERPL-SCNC: 4.3 MMOL/L (ref 3.7–5.3)
PROTHROMBIN TIME: 26.3 SEC (ref 11.8–14.6)
RBC # BLD: 3.57 M/UL (ref 4.5–5.9)
SEG NEUTROPHILS: 51 % (ref 36–66)
SEGMENTED NEUTROPHILS ABSOLUTE COUNT: 2.7 K/UL (ref 1.3–9.1)
SODIUM BLD-SCNC: 140 MMOL/L (ref 135–144)
WBC # BLD: 5.2 K/UL (ref 3.5–11)

## 2022-12-23 PROCEDURE — 97535 SELF CARE MNGMENT TRAINING: CPT

## 2022-12-23 PROCEDURE — 85610 PROTHROMBIN TIME: CPT

## 2022-12-23 PROCEDURE — 85025 COMPLETE CBC W/AUTO DIFF WBC: CPT

## 2022-12-23 PROCEDURE — 6370000000 HC RX 637 (ALT 250 FOR IP): Performed by: PHYSICAL MEDICINE & REHABILITATION

## 2022-12-23 PROCEDURE — 97110 THERAPEUTIC EXERCISES: CPT

## 2022-12-23 PROCEDURE — 97116 GAIT TRAINING THERAPY: CPT

## 2022-12-23 PROCEDURE — 99232 SBSQ HOSP IP/OBS MODERATE 35: CPT | Performed by: PHYSICAL MEDICINE & REHABILITATION

## 2022-12-23 PROCEDURE — 80048 BASIC METABOLIC PNL TOTAL CA: CPT

## 2022-12-23 PROCEDURE — 92507 TX SP LANG VOICE COMM INDIV: CPT

## 2022-12-23 PROCEDURE — 36415 COLL VENOUS BLD VENIPUNCTURE: CPT

## 2022-12-23 PROCEDURE — 1180000000 HC REHAB R&B

## 2022-12-23 PROCEDURE — 97530 THERAPEUTIC ACTIVITIES: CPT

## 2022-12-23 PROCEDURE — 97129 THER IVNTJ 1ST 15 MIN: CPT

## 2022-12-23 PROCEDURE — 6370000000 HC RX 637 (ALT 250 FOR IP): Performed by: STUDENT IN AN ORGANIZED HEALTH CARE EDUCATION/TRAINING PROGRAM

## 2022-12-23 RX ORDER — POLYETHYLENE GLYCOL 3350 17 G/17G
17 POWDER, FOR SOLUTION ORAL DAILY
Status: DISCONTINUED | OUTPATIENT
Start: 2022-12-24 | End: 2022-12-30 | Stop reason: HOSPADM

## 2022-12-23 RX ORDER — WARFARIN SODIUM 5 MG/1
5 TABLET ORAL
Status: COMPLETED | OUTPATIENT
Start: 2022-12-23 | End: 2022-12-23

## 2022-12-23 RX ADMIN — RASAGILINE 1 MG: 1 TABLET ORAL at 07:50

## 2022-12-23 RX ADMIN — CARBIDOPA AND LEVODOPA 1 TABLET: 25; 100 TABLET, EXTENDED RELEASE ORAL at 20:42

## 2022-12-23 RX ADMIN — DIPHENHYDRAMINE HYDROCHLORIDE, ZINC ACETATE: 2; .1 CREAM TOPICAL at 07:48

## 2022-12-23 RX ADMIN — CARBIDOPA AND LEVODOPA 1 TABLET: 25; 100 TABLET, EXTENDED RELEASE ORAL at 07:50

## 2022-12-23 RX ADMIN — CARBIDOPA AND LEVODOPA 1 TABLET: 25; 100 TABLET ORAL at 07:50

## 2022-12-23 RX ADMIN — CARBIDOPA AND LEVODOPA 1 TABLET: 25; 100 TABLET ORAL at 20:42

## 2022-12-23 RX ADMIN — POLYETHYLENE GLYCOL 3350 17 G: 17 POWDER, FOR SOLUTION ORAL at 07:48

## 2022-12-23 RX ADMIN — ESCITALOPRAM OXALATE 20 MG: 10 TABLET ORAL at 07:48

## 2022-12-23 RX ADMIN — WARFARIN SODIUM 5 MG: 5 TABLET ORAL at 16:51

## 2022-12-23 RX ADMIN — SENNOSIDES 17.2 MG: 8.6 TABLET, FILM COATED ORAL at 20:42

## 2022-12-23 NOTE — CARE COORDINATION
ONGOING ARU DISCHARGE PLAN:        DME:Patient is alert and oriented x4. Spoke with patient regarding discharge plan and patient confirms plan is to go home with wife and OP Therapy/ patient      DME: Walker/ folding walker Adult size ordered through [employer] [place], yesterday    Patient does not have any outside appointments prior to his discharge date. Will continue to follow for additional discharge needs.        Electronically signed by Jerri Pickens RN on 12/23/2022 at 8:54 AM

## 2022-12-23 NOTE — PLAN OF CARE
Problem: Discharge Planning  Goal: Discharge to home or other facility with appropriate resources  Outcome: Progressing     Problem: Skin/Tissue Integrity  Goal: Absence of new skin breakdown  Description: 1. Monitor for areas of redness and/or skin breakdown  2. Assess vascular access sites hourly  3. Every 4-6 hours minimum:  Change oxygen saturation probe site  4. Every 4-6 hours:  If on nasal continuous positive airway pressure, respiratory therapy assess nares and determine need for appliance change or resting period. Outcome: Progressing     Problem: Safety - Adult  Goal: Free from fall injury  Outcome: Progressing     Problem: Pain  Goal: Verbalizes/displays adequate comfort level or baseline comfort level  Outcome: Progressing     Problem: Confusion  Goal: Confusion, delirium, dementia, or psychosis is improved or at baseline  Description: INTERVENTIONS:  1. Assess for possible contributors to thought disturbance, including medications, impaired vision or hearing, underlying metabolic abnormalities, dehydration, psychiatric diagnoses, and notify attending LIP  2. [place] high risk fall precautions, as indicated  3. Provide frequent short contacts to provide reality reorientation, refocusing and direction  4. Decrease environmental stimuli, including noise as appropriate  5. Monitor and intervene to maintain adequate nutrition, hydration, elimination, sleep and activity  6. If unable to ensure safety without constant attention obtain sitter and review sitter guidelines with assigned personnel  7.  Initiate Psychosocial CNS and Spiritual Care consult, as indicated  Outcome: Progressing     Problem: ABCDS Injury Assessment  Goal: Absence of physical injury  Outcome: Progressing     Problem: Nutrition Deficit:  Goal: Optimize nutritional status  Outcome: Progressing
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
## 2022-12-23 NOTE — PROGRESS NOTES
Pharmacy Note  Warfarin Consult follow-up      Recent Labs     12/22/22  0742 12/23/22  0609   INR 2.8 2.4     Recent Labs     12/23/22  0609   HGB 12.0*   HCT 35.1*          Significant Drug-Drug Interactions:  New warfarin drug-drug interactions: none  Discontinued drug-drug interactions: none  Current warfarin drug-drug interactions: sinemet, escitalopram, Opicapone, Rasagiline      Date             INR        Dose given previous day  Dose scheduled for today  12/23/2022            2.4       none           5 mg        Notes:                   INR - 2.4, will give warfarin 5 mg today. Daily PT/INR while inpatient. Darlyn Everett. [facility]

## 2022-12-23 NOTE — PROGRESS NOTES
Speech Language Pathology  Speech Language Pathology  [facility] at [facility]    Speech Language/cognitive Treatment Note    Date: 12/23/2022  Patients Name: Lorna Pérez  MRN: [id number]  Diagnosis:   Patient Active Problem List   Diagnosis Code    Atrial fibrillation [facility]) I48.91    Chronic back pain M54.9, G89.29    Erectile dysfunction N52.9    Gait disorder R26.9    Hypertension I10    Lower urinary tract symptoms R39.9    Malignant neoplasm of testis (HCC) C62.90    Osteoarthrosis M19.90    Parkinson disease ([address] G20    Peripheral nerve disease G62.9    Cord compression ([address] G95.20    Moderate malnutrition ([address] E44.0       Pain: none reported    Speech and Language Treatment  Treatment time: 5600-8600    Subjective: [x] Alert [x] Cooperative     [] Confused     [] Agitated    [] Lethargic      Objective/Assessment:    Speech: Pt. Demonstrated mild to moderate dysarthria characterized by rapid rate of speech, imprecise articulation and reduced intensity. Intelligibility rated at 70%. Pt. Damian Camejo. Re: compensatory dysarthria strategies and able to repeat   short sentences given model 100% of the time, pt. Able to carry over into structured tasks and in conversation c min A. Cognition: Describe differences/similarities between 2 objects- 80%. 4 word scrambled sentences- 81%. Pt. Demonstrated prolonged response latency. Orientation- 100%      Plan:  [x] Continue ST services    [] Discharge from ST:      Discharge recommendations: []  Further therapy recommended at discharge. The patient should be able to tolerate at least 3 hours of therapy per day over 5 days or 15 hours over 7 days. [] Further therapy recommended at discharge. [] No therapy recommended at discharge.       Treatment completed by: Brandon Chan, FOREST, M.A., [id number] [facility]

## 2022-12-23 NOTE — PROGRESS NOTES
[address]    Acute Rehabilitation Occupational Therapy Daily Treatment Note    Date: 22  Patient Name: Maria A Jefferson       Room: 8887/7300-14  MRN: [id number]  Account: [de-identified]   : 1955  (78 y.o.) Gender: male       Referring Practitioner: Madeleine Pino MD  Diagnosis: Cord compression s/p surgical decompression/fusion L1-4  Additional Pertinent Hx: Patient with spinal cord compression who had L1-4 laminectomy and fusion performed by Dr. Raymon Deluca formarked L1-2 stenosis and degenerative spondylolisthesis at L3-4 on 22. Medical records indicate some post-op confusion. Pt admitted to ARU on 12/15/22. Treatment Diagnosis: Impaired self care status    Past Medical History:  has a past medical history of Anxiety, Asthma, Atrial fibrillation ([address] Back pain, BPH (benign prostatic hyperplasia), Dental disease, Depression, Diarrhea, MALONE (dyspnea on exertion), Frequent falls, GERD (gastroesophageal reflux disease), Memory loss, Obesity, Panic disorder, Parkinson's disease ([address] Testicular cancer ([address] and Visual impairment. Past Surgical History:   has a past surgical history that includes Cholecystectomy; hernia repair; Total knee arthroplasty (Bilateral); lumbar laminectomy; Orchiectomy; brow lift; Carpal tunnel release (Right); Shoulder arthroscopy (Right); Wrist surgery (Left); Colonoscopy; Cystoscopy; brain surgery; TURP; and lymph node dissection. Restrictions  Restrictions/Precautions  Restrictions/Precautions: Surgical Protocols, Fall Risk, General Precautions, Up as Tolerated  Required Braces or Orthoses?: Yes  Implants present? : Metal implants, Deep brain stimulator  Required Braces or Orthoses  Spinal: Lumbar Corset  Position Activity Restriction  Spinal Precautions: Avoid Excessive Bending, Lifting, and Twisting of spine  Other position/activity restrictions: LSO to be donned when OOB; Clarit[place] per Dr. Amarilis Tinoco to shower POD #7 (Sx date: 22).  PT/OT therapy staff may titrate oxygen down as tolerated in therapy, but may only titrate up to maximum 4 L NC as needed in therapy. Nursing should perform oxygen titration > 4 L. Vitals      Subjective  Subjective  Subjective: AM: Pt. in bed upon arrival. Just finishing breakfast. Requesting to use bathroom- then agreeable to take shower. PM: Pt. in recliner chair upon arrival. Alert. Ready to participate. Pain: No c/o any pain at this time. Objective  Cognition  Overall Orientation Status: Within Functional Limits  Orientation Level: Oriented X4    Cognition  Overall Cognitive Status: Exceptions  Arousal/Alertness: Delayed responses to stimuli  Following Commands: Follows one step commands with increased time; Follows one step commands with repetition  Attention Span: Difficulty attending to directions  Memory: Decreased short term memory;Decreased recall of precautions;Decreased recall of recent events  Safety Judgement: Decreased awareness of need for assistance;Decreased awareness of need for safety  Problem Solving: Assistance required to identify errors made;Assistance required to correct errors made;Assistance required to implement solutions;Assistance required to generate solutions;Decreased awareness of errors  Insights: Decreased awareness of deficits  Initiation: Requires cues for some  Sequencing: Requires cues for some  Cognition Comment: Pt's Wife Yanet Castro states that pt's cognition and memory is near baseline; Hx of parkinson's disease Dx'd 10-15 years ago    Activities of Daily Living  Feeding  Assistance Level: Modified independent    Grooming/Oral Hygiene  Assistance Level: Stand by assist  Skilled Clinical Factors: Standing at sink side with rw for oral hygiene and hair brushing. Upper Extremity Bathing  Assistance Level: Supervision  Skilled Clinical Factors: Seated on shower bench.     Lower Extremity Bathing  Assistance Level: Contact guard assist  Skilled Clinical Factors: SBA during bathing tasks while seated, CGA during standing- uses grab bar for balance/support. Upper Extremity Dressing  Assistance Level: Minimal assistance  Skilled Clinical Factors: Pt. able to doff and ashley overhead tshirts without assistance. Rajni provided for doffing and donning of - pt is able to assist writer. Lower Extremity Dressing  Assistance Level: Moderate assistance  Skilled Clinical Factors: Intermittent assistance with threading of each LE. Prefers to not use AE (reacher). Once threaded pt is able to complete clothing management while standing with CGA. Putting On/Taking Off Footwear  Assistance Level: Minimal assistance  Skilled Clinical Factors: Pt. is able to doff/ashley socks using figure 4 technique. Intermittent assist with achievement of figure 4 position. Pt. is also able to ashley shoes using figure 4 technique- but requires assistance to tie. Toileting  Assistance Level: Contact guard assist  Skilled Clinical Factors: CGA/SBA during clothing management before and after transferring to toilet. Pt. is able to complete toilet hygiene while seated by weight shifting (I). Toilet Transfers  Technique:  (Ambulating with rw.)  Equipment: Standard toilet;Grab bars  Assistance Level: Contact guard assist  Skilled Clinical Factors: Pt. requires verbal cuing for proper technique with rw while pivoting and for placing self in appropriate position in front of toilet. CGA/SBA for transfer. Uses grab bar effectively. Tub/Shower Transfers  Type: Shower  Transfer From: Rolling walker  Transfer To: Tub transfer bench  Assistance Level: Contact guard assist  Skilled Clinical Factors: Verbal cuing for safe technique with rw while maneuvering over landing into shower. Mobility     Supine to Sit  Assistance Level: Stand by assist  Skilled Clinical Factors: Head of bed elevated.   Scooting  Assistance Level: Stand by assist  Skilled Clinical Factors: hips to EOB    Transfers  Surface: From bed;Standard toilet; Wheelchair; To chair with arms;From chair with arms (And shower bench.)  Device: Walker  Sit to Stand  Assistance Level: Contact guard assist  Skilled Clinical Factors: CGA/SBA- cuing for hand placement and postural alignment. Stand to Sit  Assistance Level: Contact guard assist  Skilled Clinical Factors: CGA/SBA- cuing for hand placement and controlled descend. Functional Mobility  Device: Rolling walker  Activity: To/From bathroom  Assistance Level: Minimal assistance  Skilled Clinical Factors: CGA during mobility overall- occasional Kyler with verbal cuing provided to correct posterior lean. OT Exercises  Exercise Treatment: Instruction and participation in B UE str exercises with 2# free wt, 10-15 reps in all planes, rest breaks taken as needed, to continue to increase general str and activity tolerance for maximized indep and safety during all daily self care and leisure activities. Motor Control/Coordination: Participation in graded fine motor and object manipulation activities- retreiving and placing small items, and using both hands to work together for manipulation of functional items such as buttons, ties, and dwayne, all to continue to increase general control and coordination to allow for the most effective daily self care and leisure performances. Assessment  Assessment  Activity Tolerance: Patient tolerated treatment well  Discharge Recommendations: 24 hour supervision or assist;Home with Home health OT    Patient Education  Education  Education Given To: Patient  Education Provided: Role of Therapy;Plan of Care;Precautions; ADL Function;Transfer Training;Cognition  Education Method: Verbal;Demonstration  Barriers to Learning: Cognition  Education Outcome: Verbalized understanding;Demonstrated understanding;Continued education needed    OT Equipment Recommendations  Other: Pt and pt spouse reports having walk in shower, shower bench, and grab bars in shower and around commode at private residence    Safety Devices  Safety Devices in place: Yes  Type of devices: All fall risk precautions in place     Goals  Patient Goals   Patient goals : \"I would like to be able to get up and go so I can go outside and do some repairs and go downstairs and reload some guerrero. Do the things I normally do. \"  Short Term Goals  Time Frame for Short Term Goals: By 1 week  Short Term Goal 1: Pt will complete upper body dressing/bathing with Min A and Good attention/safety while maintaining back precautions  Short Term Goal 2: Pt will complete lower body dressing/bathing with Mod A and Good safety with use of AE as needed while maintaining back precautions  Short Term Goal 3: Pt will complete funcitonal transfers/mobility during self care tasks with Min A and Good safety with use of least restrictive device  Short Term Goal 4: Pt will tolerate standing 4+ minutes during self care tasks with Min A and Good safety  Short Term Goal 5: Pt will verbalize/demonstrate understanding of back precautions during daily activities 80% accuracy  Short Term Goal 6: Pt will participate in 30+ minutes during therapeutic exercises/functional activities to increase safety and independence with self care and mobility    Long Term Goals  Time Frame for Long Term Goals : By discharge  Long Term Goal 1: Pt will complete BADLs with SBA and Good attention/safety to task while maintaining back precautions  Long Term Goal 2: Pt will complete functional transfers/mobility during self care taks with SBA and Good safety with use of least restrictive device  Long Term Goal 3: Pt will tolerate standing 8+ minutes during self care tasks with SBA and Good safety  Long Term Goal 4: Pt will demonstrate increased [address] and strength during self care tasks as evident by 5# improvement in  strength and 15 second improvement on 9 hole peg test.  Long Term Goal 5: Pt will demonstrate sitting EOB 20+ minutes during self care tasks while maintaining appropriate midline with SBA  Long Term Goal 6: Pt/family will verbalize/demonstrate of home safety/fall prevention strategies to increase safety and independence with self care and mobility    Plan  Occupational Therapy Plan  Times Per Week: 5-7  Times Per Day: Twice a day  Current Treatment Recommendations: Self-Care / ADL, Strengthening, ROM, Balance training, Functional mobility training, Endurance training, Cognitive reorientation, Pain management, Safety education & training, Patient/Caregiver education & training, Equipment evaluation, education, & procurement, Home management training, Coordination training, Cognitive/Perceptual training         12/23/22 8830 12/23/22 1556   OT Individual Minutes   Time In 0800 1300   Time Out 0900 1330   Minutes 60 30       Electronically signed by KIM Calloway on 12/23/22 at 3:56 PM EST
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
## 2022-12-24 PROBLEM — M48.061 SPINAL STENOSIS OF LUMBAR REGION: Status: ACTIVE | Noted: 2022-12-24

## 2022-12-24 LAB
INR BLD: 2.3
PROTHROMBIN TIME: 25.5 SEC (ref 11.8–14.6)

## 2022-12-24 PROCEDURE — 97116 GAIT TRAINING THERAPY: CPT

## 2022-12-24 PROCEDURE — 6370000000 HC RX 637 (ALT 250 FOR IP): Performed by: PSYCHIATRY & NEUROLOGY

## 2022-12-24 PROCEDURE — 6370000000 HC RX 637 (ALT 250 FOR IP): Performed by: STUDENT IN AN ORGANIZED HEALTH CARE EDUCATION/TRAINING PROGRAM

## 2022-12-24 PROCEDURE — 99232 SBSQ HOSP IP/OBS MODERATE 35: CPT | Performed by: STUDENT IN AN ORGANIZED HEALTH CARE EDUCATION/TRAINING PROGRAM

## 2022-12-24 PROCEDURE — 97530 THERAPEUTIC ACTIVITIES: CPT

## 2022-12-24 PROCEDURE — 36415 COLL VENOUS BLD VENIPUNCTURE: CPT

## 2022-12-24 PROCEDURE — 6370000000 HC RX 637 (ALT 250 FOR IP): Performed by: PHYSICAL MEDICINE & REHABILITATION

## 2022-12-24 PROCEDURE — 97110 THERAPEUTIC EXERCISES: CPT

## 2022-12-24 PROCEDURE — 99232 SBSQ HOSP IP/OBS MODERATE 35: CPT | Performed by: INTERNAL MEDICINE

## 2022-12-24 PROCEDURE — 85610 PROTHROMBIN TIME: CPT

## 2022-12-24 PROCEDURE — 1180000000 HC REHAB R&B

## 2022-12-24 RX ORDER — WARFARIN SODIUM 5 MG/1
5 TABLET ORAL
Status: COMPLETED | OUTPATIENT
Start: 2022-12-24 | End: 2022-12-24

## 2022-12-24 RX ORDER — QUETIAPINE FUMARATE 25 MG/1
25 TABLET, FILM COATED ORAL NIGHTLY
Status: DISCONTINUED | OUTPATIENT
Start: 2022-12-24 | End: 2022-12-30 | Stop reason: HOSPADM

## 2022-12-24 RX ADMIN — WARFARIN SODIUM 5 MG: 5 TABLET ORAL at 17:26

## 2022-12-24 RX ADMIN — CARBIDOPA AND LEVODOPA 1 TABLET: 25; 100 TABLET ORAL at 07:51

## 2022-12-24 RX ADMIN — SENNOSIDES 17.2 MG: 8.6 TABLET, FILM COATED ORAL at 21:05

## 2022-12-24 RX ADMIN — ESCITALOPRAM OXALATE 20 MG: 10 TABLET ORAL at 07:51

## 2022-12-24 RX ADMIN — RASAGILINE 1 MG: 1 TABLET ORAL at 07:51

## 2022-12-24 RX ADMIN — POLYETHYLENE GLYCOL 3350 17 G: 17 POWDER, FOR SOLUTION ORAL at 07:50

## 2022-12-24 RX ADMIN — CARBIDOPA AND LEVODOPA 1 TABLET: 25; 100 TABLET, EXTENDED RELEASE ORAL at 07:51

## 2022-12-24 RX ADMIN — CARBIDOPA AND LEVODOPA 1 TABLET: 25; 100 TABLET ORAL at 21:05

## 2022-12-24 RX ADMIN — DIPHENHYDRAMINE HYDROCHLORIDE, ZINC ACETATE: 2; .1 CREAM TOPICAL at 15:46

## 2022-12-24 RX ADMIN — DIPHENHYDRAMINE HYDROCHLORIDE, ZINC ACETATE: 2; .1 CREAM TOPICAL at 07:53

## 2022-12-24 RX ADMIN — CARBIDOPA AND LEVODOPA 1 TABLET: 25; 100 TABLET, EXTENDED RELEASE ORAL at 21:05

## 2022-12-24 RX ADMIN — QUETIAPINE FUMARATE 25 MG: 25 TABLET ORAL at 21:05

## 2022-12-24 ASSESSMENT — ENCOUNTER SYMPTOMS
ALLERGIC/IMMUNOLOGIC NEGATIVE: 1
GASTROINTESTINAL NEGATIVE: 1
EYES NEGATIVE: 1
RESPIRATORY NEGATIVE: 1

## 2022-12-24 NOTE — PLAN OF CARE
Problem: Safety - Adult  Goal: Free from fall injury  Outcome: Not Progressing     Problem: Confusion  Goal: Confusion, delirium, dementia, or psychosis is improved or at baseline  Description: INTERVENTIONS:  1. Assess for possible contributors to thought disturbance, including medications, impaired vision or hearing, underlying metabolic abnormalities, dehydration, psychiatric diagnoses, and notify attending LIP  2. [place]ords high risk fall precautions, as indicated  3. Provide frequent short contacts to provide reality reorientation, refocusing and direction  4. Decrease environmental stimuli, including noise as appropriate  5. Monitor and intervene to maintain adequate nutrition, hydration, elimination, sleep and activity  6. If unable to ensure safety without constant attention obtain sitter and review sitter guidelines with assigned personnel  7. Initiate Psychosocial CNS and Spiritual Care consult, as indicated  12/24/2022 1553 by Ashley Huntley LPN  Outcome: Progressing     Problem: Discharge Planning  Goal: Discharge to home or other facility with appropriate resources  Outcome: Progressing  Flowsheets (Taken 12/24/2022 0914)  Discharge to home or other facility with appropriate resources: Identify barriers to discharge with patient and caregiver     Problem: Skin/Tissue Integrity  Goal: Absence of new skin breakdown  Description: 1. Monitor for areas of redness and/or skin breakdown  2. Assess vascular access sites hourly  3. Every 4-6 hours minimum:  Change oxygen saturation probe site  4. Every 4-6 hours:  If on nasal continuous positive airway pressure, respiratory therapy assess nares and determine need for appliance change or resting period.   Outcome: Progressing     Problem: Pain  Goal: Verbalizes/displays adequate comfort level or baseline comfort level  12/24/2022 1553 by Ashley Huntley LPN  Outcome: Progressing  Flowsheets (Taken 12/19/2022 1714 by Gene Lind RN)  Verbalizes/displays adequate comfort level or baseline comfort level:   Encourage patient to monitor pain and request assistance   Assess pain using appropriate pain scale   Administer analgesics based on type and severity of pain and evaluate response   Implement non-pharmacological measures as appropriate and evaluate response   Consider cultural and social influences on pain and pain management   Notify Licensed Independent Practitioner if interventions unsuccessful or patient reports new pain     Problem: ABCDS Injury Assessment  Goal: Absence of physical injury  12/24/2022 1553 by Maico Moreland LPN  Outcome: Progressing     Problem: Nutrition Deficit:  Goal: Optimize nutritional status  12/24/2022 1553 by Maico Moreland LPN  Outcome: Progressing     Problem: Safety - Adult  Goal: Free from fall injury  Outcome: Progressing

## 2022-12-24 NOTE — PROGRESS NOTES
Pharmacy Note  Warfarin Consult follow-up      Recent Labs     12/22/22  0742 12/23/22  0609 12/24/22  0926   INR 2.8 2.4 2.3     Recent Labs     12/23/22  0609   HGB 12.0*   HCT 35.1*          Significant Drug-Drug Interactions:  New warfarin drug-drug interactions: lexapro, sinemet, rasagiline   Discontinued drug-drug interactions: none   Current warfarin drug-drug interactions: none       Date             INR        Dose given previous day  Dose scheduled for today  12/24/2022            2.3 (goal 2-3)       5 mg           5 mg   Notes:                   Continue with home dose of 5 mg. Daily PT/INR while inpatient.      Noreen Fishman PharmD, BCPS  12/24/2022 12:42 PM

## 2022-12-24 NOTE — PROGRESS NOTES
[facility] Internal Medicine  Abram Daniels MD; Sayra Barahona MD; Eduarda White MD; MD Leyda Morales MD; Aziza Manzano MD    [facility] Internal Medicine   [facility]    HISTORY AND PHYSICAL EXAMINATION            Date:   12/24/2022  Patient name:  Patrick Antony  Date of admission:  12/15/2022  5:57 PM  MRN:   [id number]  Account:  [de-identified]  YOB: 1955  PCP:    Francisco Martini MD  Room:   [facility]/9187-  Code Status:    Full Code    Chief Complaint:       Back pain from spinal stenosis s/p laminectomy    History Obtained From:     Patient and electronic medical record    History of Present Illness:     Patrick Antony is a 79 y.o. Unavailable / unknown male who has past medical history of a . Fib and parkinson presents with back pain from spinal cord compression s/p laminectomy. He is admitted for acute rehabilitation. Laminectomy done at [facility] 1 week back. Currently patient denied back pain. He dose have weakness in bilateral lower extremity 4/5.      Past Medical History:     Past Medical History:   Diagnosis Date    Anxiety     Asthma     Atrial fibrillation (HCC)     Back pain     BPH (benign prostatic hyperplasia)     Dental disease     Depression     Diarrhea     MALONE (dyspnea on exertion)     Frequent falls     GERD (gastroesophageal reflux disease)     Memory loss     Obesity     Panic disorder     Parkinson's disease ([address]     Testicular cancer ([address]     Visual impairment         Past Surgical History:     Past Surgical History:   Procedure Laterality Date    BRAIN SURGERY      Neurostimulator implant for Parkinson's    BROW LIFT      CARPAL TUNNEL RELEASE Right     CHOLECYSTECTOMY      COLONOSCOPY      CYSTOSCOPY      HERNIA REPAIR      LUMBAR LAMINECTOMY      LYMPH NODE DISSECTION      ORCHIECTOMY      SHOULDER ARTHROSCOPY Right     TOTAL KNEE ARTHROPLASTY Bilateral     TURP      WRIST SURGERY Left     cyst removal        Medications Prior to Admission:     Prior to Admission medications    Not on File        Allergies:     Patient has no known allergies. Social History:     Tobacco:    reports that he has never smoked. He has never used smokeless tobacco.  Alcohol:      has no history on file for alcohol use. Drug Use:  has no history on file for drug use. Family History:     Family History   Problem Relation Age of Onset    Other Mother     Heart Failure Father     Other Sister     Behavior Problems Sister     No Known Problems Brother        Review of Systems:     Review of Systems   Constitutional: Negative. HENT: Negative. Eyes: Negative. Respiratory: Negative. Cardiovascular: Negative. Gastrointestinal: Negative. Endocrine: Negative. Genitourinary: Negative. Musculoskeletal: Negative. Bilateral lower extremity weakness. Skin: Negative. Allergic/Immunologic: Negative. Neurological: Negative. Hematological: Negative. Psychiatric/Behavioral: Negative. [place] Physical Exam:     Physical Exam   Vitals:    Vitals:    22 1808 22 0515 22 0000 22 0634   BP: (!) 143/81 (!) 132/91 119/87 127/75   Pulse: 88 60 74 (!) 107   Resp: 20 18 18 16   Temp: 99.1 °F (37.3 °C) 97.7 °F (36.5 °C) 98.7 °F (37.1 °C) 97.5 °F (36.4 °C)   TempSrc: Oral Oral Oral    SpO2:    97%   Weight:       Height:                        Body mass index is 32.75 kg/m². Temp (24hrs), Av.1 °F (36.7 °C), Min:97.5 °F (36.4 °C), Max:98.7 °F (37.1 °C)    No results for input(s): POCGLU in the last 72 hours. General Appearance:   well apearing             Skin:                             No rash or erythema  HEENT ;                                                                       No icterus, no pallor . No ptosis.     No gross asymmetry  Or abnormality face         Neck:                            No mass , no thyroid enlargement                                           Pulmonary/Chest:                                               Symmetric                                          Clear to auscultation bilaterally . No wheezes,                                          No rales or rhonchi . No abnormality on percussion                                                        Cardiovascular:            Normal rate, regular rhythm,                                          No murmur or  Gallop . Abdomen:                       Soft, non-tender                                           Normal bowels sounds,                                             Extremities:                    normal sensation.  Power 4/5 in bilateral lower extremities                                           Musculo-skeletal / Neurological ;;                                                                                               Investigations:      URINE ANALYSIS: No results found for: LABURIN     CBC:  Lab Results   Component Value Date/Time    WBC 5.2 12/23/2022 06:09 AM    HGB 12.0 12/23/2022 06:09 AM     12/23/2022 06:09 AM             BMP:    Lab Results   Component Value Date/Time     12/23/2022 06:09 AM    K 4.3 12/23/2022 06:09 AM     12/23/2022 06:09 AM    CO2 25 12/23/2022 06:09 AM    BUN 22 12/23/2022 06:09 AM    CREATININE 0.76 12/23/2022 06:09 AM    GLUCOSE 93 12/23/2022 06:09 AM      LIVER PROFILE:  Lab Results   Component Value Date/Time    ALT 44 12/16/2022 06:21 AM    AST 35 12/16/2022 06:21 AM    PROT 7.0 12/16/2022 06:21 AM    BILITOT 0.7 12/16/2022 06:21 AM    BILIDIR 0.2 12/16/2022 06:21 AM    LABALBU 4.0 12/16/2022 06:21 AM             @BRIEFLABT(TSH)@      Laboratory Testing:  Recent Results (from the past 24 hour(s))   Protime-INR    Collection Time: 12/24/22  9:26 AM   Result Value Ref Range Protime 25.5 (H) 11.8 - 14.6 sec    INR 2.3          Imaging/Diagnostics:  No results found. Assessment :      Hospital Problems             Last Modified POA    * (Principal) Cord compression ([facility] 12/15/2022 Yes    Moderate malnutrition ([facility] 12/16/2022 Yes   Fay Nuñez is a 79 y.o. Unavailable / unknown male who has past medical history of a . Fib and parkinson presents with back pain from spinal cord compression s/p laminectomy. He is admitted for acute rehabilitation. Laminectomy done at [facility] 1 week back. Plan:       Medications: Allergies:  No Known Allergies    Current Meds:   Scheduled Meds:    warfarin  5 mg Oral Once    QUEtiapine  25 mg Oral Nightly    polyethylene glycol  17 g Oral Daily    senna  2 tablet Oral Nightly    Opicapone  50 mg Oral Nightly    mirabegron  50 mg Oral Nightly    carbidopa-levodopa  1 tablet Oral BID    carbidopa-levodopa  1 tablet Oral BID    escitalopram  20 mg Oral Daily    rasagiline mesylate  1 mg Oral Daily    warfarin placeholder: dosing by pharmacy   Other RX Placeholder     Continuous Infusions:   PRN Meds: oxyCODONE-acetaminophen, diphenhydrAMINE-zinc acetate, tiZANidine, acetaminophen, bisacodyl          12/24/2022    Admitted for acute rehab after laminectomy for spinal stenosis   Hemodynamically stable, saturating well in room air. Continue physical therapy and occupation therapy. 12/19/22    Patient tolerating rehabilitative therapies . Progressing fairly well . Continue present therapy . 12/24  Patient advancing well with physical therapy  Hypertension, controlled  On Coumadin,last  INR is 2.3  History of Parkinson disease on Sinemet  Family very  happy with his recovery     MD LOLY Guillen [address], [address] [place] [address].    Phone [phone]   Fax: [phone]  Answering Service: [phone]            12/24/2022  4:49 PM    Copy sent to Dr. Queta Sebastian MD    Please note that this chart was generated using voice recognition Dragon dictation software. Although every effort was made to ensure the accuracy of this automated transcription, some errors in transcription may have occurred.
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
## 2022-12-25 LAB
INR BLD: 2.6
PROTHROMBIN TIME: 27.5 SEC (ref 11.8–14.6)

## 2022-12-25 PROCEDURE — 6370000000 HC RX 637 (ALT 250 FOR IP): Performed by: PHYSICAL MEDICINE & REHABILITATION

## 2022-12-25 PROCEDURE — 97116 GAIT TRAINING THERAPY: CPT

## 2022-12-25 PROCEDURE — 99232 SBSQ HOSP IP/OBS MODERATE 35: CPT | Performed by: INTERNAL MEDICINE

## 2022-12-25 PROCEDURE — 36415 COLL VENOUS BLD VENIPUNCTURE: CPT

## 2022-12-25 PROCEDURE — 97535 SELF CARE MNGMENT TRAINING: CPT

## 2022-12-25 PROCEDURE — 99231 SBSQ HOSP IP/OBS SF/LOW 25: CPT | Performed by: STUDENT IN AN ORGANIZED HEALTH CARE EDUCATION/TRAINING PROGRAM

## 2022-12-25 PROCEDURE — 1180000000 HC REHAB R&B

## 2022-12-25 PROCEDURE — 6370000000 HC RX 637 (ALT 250 FOR IP): Performed by: STUDENT IN AN ORGANIZED HEALTH CARE EDUCATION/TRAINING PROGRAM

## 2022-12-25 PROCEDURE — 85610 PROTHROMBIN TIME: CPT

## 2022-12-25 PROCEDURE — 97530 THERAPEUTIC ACTIVITIES: CPT

## 2022-12-25 PROCEDURE — 97112 NEUROMUSCULAR REEDUCATION: CPT

## 2022-12-25 PROCEDURE — 6370000000 HC RX 637 (ALT 250 FOR IP): Performed by: PSYCHIATRY & NEUROLOGY

## 2022-12-25 RX ORDER — WARFARIN SODIUM 5 MG/1
5 TABLET ORAL
Status: COMPLETED | OUTPATIENT
Start: 2022-12-25 | End: 2022-12-25

## 2022-12-25 RX ADMIN — WARFARIN SODIUM 5 MG: 5 TABLET ORAL at 16:42

## 2022-12-25 RX ADMIN — CARBIDOPA AND LEVODOPA 1 TABLET: 25; 100 TABLET, EXTENDED RELEASE ORAL at 09:06

## 2022-12-25 RX ADMIN — RASAGILINE 1 MG: 1 TABLET ORAL at 09:06

## 2022-12-25 RX ADMIN — SENNOSIDES 17.2 MG: 8.6 TABLET, FILM COATED ORAL at 21:03

## 2022-12-25 RX ADMIN — CARBIDOPA AND LEVODOPA 1 TABLET: 25; 100 TABLET ORAL at 09:06

## 2022-12-25 RX ADMIN — CARBIDOPA AND LEVODOPA 1 TABLET: 25; 100 TABLET, EXTENDED RELEASE ORAL at 21:05

## 2022-12-25 RX ADMIN — QUETIAPINE FUMARATE 25 MG: 25 TABLET ORAL at 21:03

## 2022-12-25 RX ADMIN — ESCITALOPRAM OXALATE 20 MG: 10 TABLET ORAL at 09:06

## 2022-12-25 RX ADMIN — DIPHENHYDRAMINE HYDROCHLORIDE, ZINC ACETATE: 2; .1 CREAM TOPICAL at 21:03

## 2022-12-25 RX ADMIN — CARBIDOPA AND LEVODOPA 1 TABLET: 25; 100 TABLET ORAL at 21:04

## 2022-12-25 ASSESSMENT — ENCOUNTER SYMPTOMS
RESPIRATORY NEGATIVE: 1
EYES NEGATIVE: 1
ALLERGIC/IMMUNOLOGIC NEGATIVE: 1
GASTROINTESTINAL NEGATIVE: 1

## 2022-12-25 NOTE — PROGRESS NOTES
[facility] Internal Medicine  Lopez Clarke MD; Ale Cesar MD; Victoria Schaumann, MD; Gerrianne Cranker, MD Carlon Pillion, MD; MD LOLY Hart [facility] Internal Medicine   [facility]    HISTORY AND PHYSICAL EXAMINATION            Date:   12/25/2022  Patient name:  Arelis Chua  Date of admission:  12/15/2022  5:57 PM  MRN:   [id number]  Account:  [de-identified]  YOB: 1955  PCP:    Kimberly Madrid MD  Room:   4080/9725-34  Code Status:    Full Code    Chief Complaint:       Back pain from spinal stenosis s/p laminectomy    History Obtained From:     Patient and electronic medical record    History of Present Illness:     Arelis Chua is a 79 y.o. Unavailable / unknown male who has past medical history of a . Fib and parkinson presents with back pain from spinal cord compression s/p laminectomy. He is admitted for acute rehabilitation. Laminectomy done at [facility] 1 week back. Currently patient denied back pain. He dose have weakness in bilateral lower extremity 4/5.      Past Medical History:     Past Medical History:   Diagnosis Date    Anxiety     Asthma     Atrial fibrillation (HCC)     Back pain     BPH (benign prostatic hyperplasia)     Dental disease     Depression     Diarrhea     MALONE (dyspnea on exertion)     Frequent falls     GERD (gastroesophageal reflux disease)     Memory loss     Obesity     Panic disorder     Parkinson's disease ([address]     Testicular cancer ([address]     Visual impairment         Past Surgical History:     Past Surgical History:   Procedure Laterality Date    BRAIN SURGERY      Neurostimulator implant for Parkinson's    BROW LIFT      CARPAL TUNNEL RELEASE Right     CHOLECYSTECTOMY      COLONOSCOPY      CYSTOSCOPY      HERNIA REPAIR      LUMBAR LAMINECTOMY      LYMPH NODE DISSECTION      ORCHIECTOMY      SHOULDER ARTHROSCOPY Right     TOTAL KNEE ARTHROPLASTY Bilateral     TURP      WRIST SURGERY Left     cyst removal        Medications Prior to Admission:     Prior to Admission medications    Not on File        Allergies:     Patient has no known allergies. Social History:     Tobacco:    reports that he has never smoked. He has never used smokeless tobacco.  Alcohol:      has no history on file for alcohol use. Drug Use:  has no history on file for drug use. Family History:     Family History   Problem Relation Age of Onset    Other Mother     Heart Failure Father     Other Sister     Behavior Problems Sister     No Known Problems Brother        Review of Systems:     Review of Systems   Constitutional: Negative. HENT: Negative. Eyes: Negative. Respiratory: Negative. Cardiovascular: Negative. Gastrointestinal: Negative. Endocrine: Negative. Genitourinary: Negative. Musculoskeletal: Negative. Bilateral lower extremity weakness. Skin: Negative. Allergic/Immunologic: Negative. Neurological: Negative. Hematological: Negative. Psychiatric/Behavioral: Negative. Namita Crumble Physical Exam:     Physical Exam   Vitals:    Vitals:    22 0000 22 0634 22 1847 22 1201   BP: 119/87 127/75 (!) 155/83    Pulse: 74 (!) 107 73    Resp: 18 16 18    Temp: 98.7 °F (37.1 °C) 97.5 °F (36.4 °C) 99.1 °F (37.3 °C)    TempSrc: Oral      SpO2:  97% 100% 96%   Weight:       Height:                        Body mass index is 32.75 kg/m². Temp (24hrs), Av.1 °F (37.3 °C), Min:99.1 °F (37.3 °C), Max:99.1 °F (37.3 °C)    No results for input(s): POCGLU in the last 72 hours. General Appearance:   well apearing             Skin:                             No rash or erythema  HEENT ;                                                                       No icterus, no pallor . No ptosis.     No gross asymmetry  Or abnormality face         Neck:                            No mass , no thyroid enlargement Pulmonary/Chest:                                               Symmetric                                          Clear to auscultation bilaterally . No wheezes,                                          No rales or rhonchi . No abnormality on percussion                                                        Cardiovascular:            Normal rate, regular rhythm,                                          No murmur or  Gallop . Abdomen:                       Soft, non-tender                                           Normal bowels sounds,                                             Extremities:                    normal sensation.  Power 4/5 in bilateral lower extremities                                           Musculo-skeletal / Neurological ;;                                                                                               Investigations:      URINE ANALYSIS: No results found for: LABURIN     CBC:  Lab Results   Component Value Date/Time    WBC 5.2 12/23/2022 06:09 AM    HGB 12.0 12/23/2022 06:09 AM     12/23/2022 06:09 AM             BMP:    Lab Results   Component Value Date/Time     12/23/2022 06:09 AM    K 4.3 12/23/2022 06:09 AM     12/23/2022 06:09 AM    CO2 25 12/23/2022 06:09 AM    BUN 22 12/23/2022 06:09 AM    CREATININE 0.76 12/23/2022 06:09 AM    GLUCOSE 93 12/23/2022 06:09 AM      LIVER PROFILE:  Lab Results   Component Value Date/Time    ALT 44 12/16/2022 06:21 AM    AST 35 12/16/2022 06:21 AM    PROT 7.0 12/16/2022 06:21 AM    BILITOT 0.7 12/16/2022 06:21 AM    BILIDIR 0.2 12/16/2022 06:21 AM    LABALBU 4.0 12/16/2022 06:21 AM             @BRIEFLABT(TSH)@      Laboratory Testing:  Recent Results (from the past 24 hour(s))   Protime-INR    Collection Time: 12/25/22  6:19 AM   Result Value Ref Range    Protime 27.5 (H) 11.8 - 14.6 sec INR 2.6          Imaging/Diagnostics:  No results found. Assessment :      Hospital Problems             Last Modified POA    * (Principal) Cord compression ([facility] 12/15/2022 Yes    Moderate malnutrition ([facility] 12/16/2022 Yes    Spinal stenosis of lumbar region 12/24/2022 Yes   Stacy Baird is a 79 y.o. Unavailable / unknown male who has past medical history of a . Fib and parkinson presents with back pain from spinal cord compression s/p laminectomy. He is admitted for acute rehabilitation. Laminectomy done at [facility] 1 week back. Plan:       Medications: Allergies:  No Known Allergies    Current Meds:   Scheduled Meds:    warfarin  5 mg Oral Once    QUEtiapine  25 mg Oral Nightly    polyethylene glycol  17 g Oral Daily    senna  2 tablet Oral Nightly    Opicapone  50 mg Oral Nightly    mirabegron  50 mg Oral Nightly    carbidopa-levodopa  1 tablet Oral BID    carbidopa-levodopa  1 tablet Oral BID    escitalopram  20 mg Oral Daily    rasagiline mesylate  1 mg Oral Daily    warfarin placeholder: dosing by pharmacy   Other RX Placeholder     Continuous Infusions:   PRN Meds: oxyCODONE-acetaminophen, diphenhydrAMINE-zinc acetate, tiZANidine, acetaminophen, bisacodyl          12/25/2022    Admitted for acute rehab after laminectomy for spinal stenosis   Hemodynamically stable, saturating well in room air. Continue physical therapy and occupation therapy. 12/19/22    Patient tolerating rehabilitative therapies . Progressing fairly well . Continue present therapy . 12/25  Patient advancing well with physical therapy  Hypertension, controlled  On Coumadin,last  INR is more than 2  History of Parkinson disease on Sinemet  Family very  happy with his recovery     MD LOLY Rawls [address], [address] [place] [address].    Phone [phone]   Fax: [phone]  Answering Service: [phone]            12/25/2022  2:58 PM    Copy sent to Dr. Chelsie Sultana Kd Donaldson MD    Please note that this chart was generated using voice recognition Dragon dictation software. Although every effort was made to ensure the accuracy of this automated transcription, some errors in transcription may have occurred.

## 2022-12-25 NOTE — PLAN OF CARE
Problem: Discharge Planning  Goal: Discharge to home or other facility with appropriate resources  12/25/2022 1522 by Priti Antoine RN  Outcome: Progressing  12/25/2022 0214 by Joseluis Barrera RN  Outcome: Progressing     Problem: Skin/Tissue Integrity  Goal: Absence of new skin breakdown  Description: 1. Monitor for areas of redness and/or skin breakdown  2. Assess vascular access sites hourly  3. Every 4-6 hours minimum:  Change oxygen saturation probe site  4. Every 4-6 hours:  If on nasal continuous positive airway pressure, respiratory therapy assess nares and determine need for appliance change or resting period. 12/25/2022 1522 by Priti Antoine RN  Outcome: Progressing  12/25/2022 0214 by Joseluis Barrera RN  Outcome: Progressing     Problem: Safety - Adult  Goal: Free from fall injury  12/25/2022 1522 by Priti Antoine RN  Outcome: Progressing  12/25/2022 0214 by Joseluis Barrera RN  Outcome: Progressing     Problem: Pain  Goal: Verbalizes/displays adequate comfort level or baseline comfort level  12/25/2022 1522 by Priti Antoien RN  Outcome: Progressing  12/25/2022 0214 by Joseulis Barrera RN  Outcome: Progressing     Problem: Confusion  Goal: Confusion, delirium, dementia, or psychosis is improved or at baseline  Description: INTERVENTIONS:  1. Assess for possible contributors to thought disturbance, including medications, impaired vision or hearing, underlying metabolic abnormalities, dehydration, psychiatric diagnoses, and notify attending LIP  2. [place] high risk fall precautions, as indicated  3. Provide frequent short contacts to provide reality reorientation, refocusing and direction  4. Decrease environmental stimuli, including noise as appropriate  5. Monitor and intervene to maintain adequate nutrition, hydration, elimination, sleep and activity  6. If unable to ensure safety without constant attention obtain sitter and review sitter guidelines with assigned personnel  7. Initiate Psychosocial CNS and Spiritual Care consult, as indicated  12/25/2022 1522 by Angelica Boyer RN  Outcome: Progressing  12/25/2022 0214 by Alin Limon RN  Outcome: Progressing     Problem: ABCDS Injury Assessment  Goal: Absence of physical injury  12/25/2022 1522 by Angelica Boyer RN  Outcome: Progressing  12/25/2022 0214 by Alin Limon RN  Outcome: Progressing     Problem: Nutrition Deficit:  Goal: Optimize nutritional status  12/25/2022 1522 by Angelica Boyer RN  Outcome: Progressing  12/25/2022 0214 by Alin Limon RN  Outcome: Progressing

## 2022-12-25 NOTE — PROGRESS NOTES
[address] [facility]   Acute Rehabilitation Occupational Therapy Daily Treatment Note    Date: 22  Patient Name: Marvin Douglass       Room: 5079/8087-15  MRN: [id number]  Account: [de-identified]   : 1955  (78 y.o.) Gender: male       Referring Practitioner: Manjeet Ulloa MD  Diagnosis: Cord compression s/p surgical decompression/fusion L1-4  Additional Pertinent Hx: Patient with spinal cord compression who had L1-4 laminectomy and fusion performed by Dr. Blanco Rivera formarked L1-2 stenosis and degenerative spondylolisthesis at L3-4 on 22. Medical records indicate some post-op confusion. Pt admitted to ARU on 12/15/22. Treatment Diagnosis: Impaired self care status    Past Medical History:  has a past medical history of Anxiety, Asthma, Atrial fibrillation ([address] Back pain, BPH (benign prostatic hyperplasia), Dental disease, Depression, Diarrhea, MALONE (dyspnea on exertion), Frequent falls, GERD (gastroesophageal reflux disease), Memory loss, Obesity, Panic disorder, Parkinson's disease ([address] Testicular cancer ([address] and Visual impairment. Past Surgical History:   has a past surgical history that includes Cholecystectomy; hernia repair; Total knee arthroplasty (Bilateral); lumbar laminectomy; Orchiectomy; brow lift; Carpal tunnel release (Right); Shoulder arthroscopy (Right); Wrist surgery (Left); Colonoscopy; Cystoscopy; brain surgery; TURP; and lymph node dissection. Restrictions  Restrictions/Precautions  Restrictions/Precautions: Surgical Protocols, Fall Risk, General Precautions, Up as Tolerated  Required Braces or Orthoses?: Yes  Implants present? : Metal implants, Deep brain stimulator  Required Braces or Orthoses  Spinal: Lumbar Corset  Position Activity Restriction  Spinal Precautions: Avoid Excessive Bending, Lifting, and Twisting of spine  Other position/activity restrictions: LSO to be donned when OOB;  Ok per Dr. Carl Standing to shower POD #7 (Sx date: 12/7/22). Order clarified on 12/20/22- Per Dr. Edwin Hoffman use back brace for comfort. It is okay to remove to shower. Vitals        Subjective  Subjective  Subjective: pt in bed upon arrival. delayed responses notes. pt VERY IMPULSIVE AND UNSAFE. will stand up without checking that WC brakes are engaged/ pt agitated and tells writer to leave multiple times during ADLs while in bathroom. writer states that he cannot leave pt unsuppervised. pt again directs writer to leave. Pain Assessment  Pain Assessment: None - Denies Pain    Objective  Cognition  Overall Orientation Status: Within Functional Limits  Orientation Level: Oriented X4    Cognition  Overall Cognitive Status: Exceptions  Arousal/Alertness: Delayed responses to stimuli  Following Commands: Follows one step commands with increased time; Follows one step commands with repetition  Attention Span: Difficulty attending to directions  Memory: Decreased short term memory;Decreased recall of precautions;Decreased recall of recent events  Safety Judgement: Decreased awareness of need for assistance;Decreased awareness of need for safety  Problem Solving: Assistance required to identify errors made;Assistance required to correct errors made;Assistance required to implement solutions;Assistance required to generate solutions;Decreased awareness of errors  Insights: Decreased awareness of deficits  Initiation: Requires cues for some  Sequencing: Requires cues for some    Activities of Daily Living  Feeding  Assistance Level: Modified independent    Grooming/Oral Hygiene  Assistance Level: Supervision  Skilled Clinical Factors: seated at sink    Upper Extremity Bathing  Assistance Level: Supervision  Skilled Clinical Factors: Seated in [facility] at sink    Lower Extremity Bathing  Assistance Level: Contact guard assist  Skilled Clinical Factors: SBA during bathing tasks while seated, CGA during standing for buttocks/pericare- 1 UE on sink for balance/support.     Upper Extremity Dressing  Assistance Level: Set-up; Increased time to complete  Skilled Clinical Factors: increaseed time to complete    Lower Extremity Dressing  Assistance Level: Moderate assistance  Skilled Clinical Factors: pt able to doff brief/shoes/pants. pt requires max A to thread underwear/pants. writer offers AE so pt maintains back precautions, but pt declines. pt attempts to ashley underwear twisted requiring VCs to correct. .pt requires extended time to complete all LB dressing tasks. Putting On/Taking Off Footwear  Assistance Level: Minimal assistance  Skilled Clinical Factors: Pt. is able to doff/ashley socks using figure 4 technique. Intermittent assist with achievement of figure 4 position. Pt. is also able to ashley shoes using figure 4 technique- but requires assistance to tie. Toileting  Assistance Level: Contact guard assist  Skilled Clinical Factors: CGA/SBA during clothing management before and after transferring to toilet. Pt. is able to complete toilet hygiene while seated by weight shifting (I). Toilet Transfers  Equipment: Standard toilet;Grab bars  Assistance Level: Stand by assist  Skilled Clinical Factors: Pt. requires VC for proper technique c RW while pivoting and for placing self in appropriate position in front of toilet. close SBA for transfer. Uses grab bar effectively. Mobility     Roll Right  Assistance Level: Supervision (VCs to utilize log rolling tech and rail use)     Supine to Sit  Assistance Level: Stand by assist  Skilled Clinical Factors: Head of bed elevated. and rail use c extended time  Scooting  Assistance Level: Supervision  Skilled Clinical Factors: rail use. requires extended time       Sit to Stand  Assistance Level: Contact guard assist  Skilled Clinical Factors: CGA/SBA- VCs for hand placement and postural alignment. max VCs for WC safety. ie locking brakes prior to transfer.  P return noted  Stand to Sit  Assistance Level: Contact guard assist  Skilled Clinical Factors: CGA/SBA- cuing for hand placement and controlled descend. P return noted         Functional Mobility  Device: Rolling walker  Activity: To/From bathroom  Assistance Level: Contact guard assist  Skilled Clinical Factors: CGA during mobility overall- occasional VC provided to correct posterior lean. pt impulsive. no LOB noted      Assessment  Assessment  Activity Tolerance: Patient tolerated treatment well  Discharge Recommendations: 24 hour supervision or assist;Home with Home health OT    Patient Education       OT Equipment Recommendations  Other: Pt and pt spouse reports having walk in shower, shower bench, and grab bars in shower and around commode at private residence    [address] [place] [address] in place: Yes  Type of devices: Left in chair;Call light within reach (telesittheber, RN in room as writer departs)       Goals  Patient Goals   Patient goals : \"I would like to be able to get up and go so I can go outside and do some repairs and go downstairs and reload some guerrero. Do the things I normally do. \"  Short Term Goals  Time Frame for Short Term Goals: By 1 week  Short Term Goal 1: Pt will complete upper body dressing/bathing with Min A and Good attention/safety while maintaining back precautions  Short Term Goal 2: Pt will complete lower body dressing/bathing with Mod A and Good safety with use of AE as needed while maintaining back precautions  Short Term Goal 3: Pt will complete funcitonal transfers/mobility during self care tasks with Min A and Good safety with use of least restrictive device  Short Term Goal 4: Pt will tolerate standing 4+ minutes during self care tasks with Min A and Good safety  Short Term Goal 5: Pt will verbalize/demonstrate understanding of back precautions during daily activities 80% accuracy  Short Term Goal 6: Pt will participate in 30+ minutes during therapeutic exercises/functional activities to increase safety and independence with self care and mobility    Long Term Goals  Time Frame for Long Term Goals : By discharge  Long Term Goal 1: Pt will complete BADLs with SBA and Good attention/safety to task while maintaining back precautions  Long Term Goal 2: Pt will complete functional transfers/mobility during self care taks with SBA and Good safety with use of least restrictive device  Long Term Goal 3: Pt will tolerate standing 8+ minutes during self care tasks with SBA and Good safety  Long Term Goal 4: Pt will demonstrate increased [address] and strength during self care tasks as evident by 5# improvement in  strength and 15 second improvement on 9 hole peg test.  Long Term Goal 5: Pt will demonstrate sitting EOB 20+ minutes during self care tasks while maintaining appropriate midline with SBA  Long Term Goal 6: Pt/family will verbalize/demonstrate of home safety/fall prevention strategies to increase safety and independence with self care and mobility    Plan  Occupational Therapy Plan  Times Per Week: 5-7  Times Per Day: Twice a day  Current Treatment Recommendations: Self-Care / ADL, Strengthening, ROM, Balance training, Functional mobility training, Endurance training, Cognitive reorientation, Pain management, Safety education & training, Patient/Caregiver education & training, Equipment evaluation, education, & procurement, Home management training, Coordination training, Cognitive/Perceptual training      OT Individual Minutes  OT Individual Minutes  Time In: 0800  Time Out: 0908  Minutes: 68            Electronically signed by KIM Reagan on 12/25/22 at 12:06 PM EST
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
## 2022-12-25 NOTE — PROGRESS NOTES
Physical Therapy  Facility/Department: [facility] ACUTE REHAB  Rehabilitation Physical Therapy Treatment Note    NAME: Alejandro Lucero  : 1955 (24 y.o.)  MRN: [id number]  CODE STATUS: Full Code    Date of Service: 22       Restrictions:  Restrictions/Precautions: Surgical Protocols; Fall Risk;General Precautions; Up as Tolerated  Required Braces or Orthoses  Spinal: Lumbar Corset  Position Activity Restriction  Spinal Precautions: Avoid Excessive Bending, Lifting, and Twisting of spine  Other position/activity restrictions: LSO to be donned when OOB; Rubin Hawkins per Dr. Misty Dalton to shower POD #7 (Sx date: 22). Order clarified on 22- Per Dr. Georgette Lam use back brace for comfort. It is okay to remove to shower. SUBJECTIVE  Subjective  Subjective: Pt sitting in WC with nursing present in room. Pt trying to get his socks and shoes on. Assisted pt and educated on precautions with LSO on.  Pain: Pt denies pain        Post Treatment Pain Screening         OBJECTIVE  Cognition  Overall Cognitive Status: Exceptions  Arousal/Alertness: Delayed responses to stimuli  Following Commands: Follows one step commands with increased time; Follows one step commands with repetition  Attention Span: Difficulty attending to directions  Memory: Decreased short term memory;Decreased recall of precautions;Decreased recall of recent events  Safety Judgement: Decreased awareness of need for assistance;Decreased awareness of need for safety  Problem Solving: Assistance required to identify errors made;Assistance required to correct errors made;Assistance required to implement solutions;Assistance required to generate solutions;Decreased awareness of errors  Insights: Decreased awareness of deficits  Initiation: Requires cues for some  Sequencing: Requires cues for some  Cognition Comment: Pt's Wife Shannon Rodriguez states that pt's cognition and memory is near baseline;  Hx of parkinson's disease Dx'd 10-15 years ago  Orientation  Overall Orientation Status: Within Functional Limits  Orientation Level: Oriented X4    Functional Mobility  Balance  Sitting Balance: Supervision (cues for hand placement on rails when sitting on toilet and when performing pericare in sititng)  Standing Balance: Minimal assistance (Kyler without UE support; CGA with UE support)  Standing Balance  Time: 3 minutes for pericare with Pt completing on own with Kyler and 1 UE support on walker  Activity: stand with reaching cross body (not excessive) x10 ea with unilateral UE support on walker; toe taps x10 ea without UE support; alternating toe taps 2x20, initially with BUE (CGA) and progressed to no UE support (Kyler)  Transfers  Surface: From mat; Wheelchair;Standard toilet  Additional Factors: Hand placement cues; Increased time to complete; With handrails;Verbal cues; Set-up  Device: Walker (2WW)  Sit to Stand  Assistance Level: Contact guard assist  Skilled Clinical Factors: cues for hand placement; Completed from mat table x4, from [facility] x1, from toilet x1  Stand to Sit  Assistance Level: Contact guard assist  Skilled Clinical Factors: cues for hand placement  Stand Pivot  Assistance Level: Contact guard assist;Minimal assistance  Skilled Clinical Factors: Completed with 2WW WC to mat table, festinating steps; compelted WC to chair without AD needing Kyler to stabilize. Discussed using verbal cue of \"orange\" to break festinating      Environmental Mobility  Ambulation  Surface: Level surface  Device: Rolling walker  Distance: 250ft x2  Activity: Within Unit  Additional Factors: Increased time to complete;Set-up; Verbal cues  Assistance Level: Contact guard assist;Minimal assistance (Kyler occasionally when starting to festinate)  Gait Deviations: Narrow base of support;Decreased heel strike right;Decreased heel strike left;Decreased step length bilateral  Skilled Clinical Factors: With 2WW pt is forward flexe. Increased walker height but still limited due to B knee flexion.  Cues for slowing down but still empahsizing large steps. tactile cue for posture. Mild path deviations when too fast. Festinating with turning and aligning to sit to mat. Cued using VC \"orange\" to break with mild success. When pt self cues saying organge more successful. Wheelchair  Surface: Level surface  Device: Standard wheelchair  Additional Factors: Verbal cues; Increased time to complete  Assistance Required to Manage Parts: All wheelchair parts  Assistance Level for Propulsion: Supervision  Propulsion Method: Bilateral lower extremities  Propulsion Quality: Veers left (cues for large steps)  Propulsion Distance: 250ft  Skilled Clinical Factors: cues to safely navigate environment                    ASSESSMENT/PROGRESS TOWARDS GOALS  Vital Signs  SpO2: 96 % (post ambulation in session)  O2 Device: None (Room air)    Assessment  Assessment: Overall pt does well with session. Educated extensively on using a VC to break festinating and freezing of gait with mild success during session. Also worked on standing balance. Still needing Kyler with dynamic activities. Completed toe taps to 6 in curb step as pt notes curbs cause a lot freezing of gait. Mild freezing noted so continued to use VC. Pt and wife both note understanding. Assisted pt back to room post session and transfered to chair. Transfer without 2WW with larger steps noted but needing UE support (HHA) to stabilize. Activity Tolerance: Patient tolerated treatment well  Discharge Recommendations: Patient would benefit from continued therapy after discharge  PT Equipment Recommendations  Equipment Needed: Yes  Other: 2WW    Goals  Patient Goals   Patient Goals : patient \"I havn't really thought that far\".  Spouse \"Strengthen Legs and improve safety\"  Short Term Goals  Time Frame for Short Term Goals: 7 days  Short Term Goal 1: pt to demo all Bed mobility with Kyler on flattened bed  Short Term Goal 2: pt to perform transfers with RW and Kyler  Short Term Goal 3: pt to ambulate 150' with RW and CGAx1  Short Term Goal 4: pt to negotiate single platform step + 2 successive steps (No rails) with Rajni x1 to allow for safe home access  Short Term Goal 5: pt to verbally identify 3/3 spinal precautions to maximize safety and functional outcomes. Long Term Goals  Time Frame for Long Term Goals : Until D/C  Long Term Goal 1: pt to demo all Bed mobility on flattened bed with supervision  Long Term Goal 2: pt to perform transfers with RW and supervision  Long Term Goal 3: pt to ambulate 150' with RW and SBAx1  Long Term Goal 4: pt to negotiate single platform step + 2 successive steps (No rails) with SBA to allow for safe home access  Long Term Goal 5: pt to improve BLE strength by 1/2 MMG to improve safety with mobility  Additional Goals?: Yes  Long term goal 6: pt to improve Sitting and standing balance to FAIR or better to decrease risk for falls during mobility  Long term goal 7: pt to demo simulated or actual car transfer with SBA  Long term goal 8: pt to negotiate FF of steps with Bilateral Rail and SBA to allow for safe access to basement level of home. PLAN OF CARE/SAFETY  Physcial Therapy Plan  General Plan:  minutes of therapy at least 5 out of 7 days a week  Specific Instructions for Next Treatment: Progress ambulation, PD Disease specific exercise, postural training  Current Treatment Recommendations: Strengthening;ROM;Balance training;Functional mobility training;Transfer training;Gait training; Wheelchair mobility training;Stair training;Neuromuscular re-education;Pain management;Home exercise program;Safety education & training;Patient/Caregiver education & training;Equipment evaluation, education, & procurement;Positioning; Therapeutic activities  Safety Devices  Type of Devices: Call light within reach; Chair alarm in place; Left in chair;Telesitter in use (wife present in room)    EDUCATION  Education  Education Given To: Patient; Family  Education Provided:  Mobility Training; Safety  Education Provided Comments: discussed using VC to break freezing of gait. Discussed how this improves motor planning.  Pt does better when he self cues  Education Method: Demonstration;Verbal  Barriers to Learning: Cognition  Education Outcome: Continued education needed;Demonstrated understanding        Therapy Time   Individual Concurrent Group Co-treatment   Time In 0910         Time Out 0948         Minutes [address] x`1, TA x1,  NMRE x1          Silvestre, [facility], 12/25/22 at 12:18 PM

## 2022-12-25 NOTE — PLAN OF CARE
Problem: Discharge Planning  Goal: Discharge to home or other facility with appropriate resources  12/25/2022 0214 by Giovanni Umana RN  Outcome: Progressing     Problem: Skin/Tissue Integrity  Goal: Absence of new skin breakdown  Description: 1. Monitor for areas of redness and/or skin breakdown  2. Assess vascular access sites hourly  3. Every 4-6 hours minimum:  Change oxygen saturation probe site  4. Every 4-6 hours:  If on nasal continuous positive airway pressure, respiratory therapy assess nares and determine need for appliance change or resting period. 12/25/2022 0214 by Giovanni Umana RN  Outcome: Progressing     Problem: Safety - Adult  Goal: Free from fall injury  12/25/2022 0214 by Giovanni Umana RN  Outcome: Progressing     Problem: Pain  Goal: Verbalizes/displays adequate comfort level or baseline comfort level  12/25/2022 0214 by Giovanni Umana RN  Outcome: Progressing     Problem: Confusion  Goal: Confusion, delirium, dementia, or psychosis is improved or at baseline  Description: INTERVENTIONS:  1. Assess for possible contributors to thought disturbance, including medications, impaired vision or hearing, underlying metabolic abnormalities, dehydration, psychiatric diagnoses, and notify attending LIP  2. [place] high risk fall precautions, as indicated  3. Provide frequent short contacts to provide reality reorientation, refocusing and direction  4. Decrease environmental stimuli, including noise as appropriate  5. Monitor and intervene to maintain adequate nutrition, hydration, elimination, sleep and activity  6. If unable to ensure safety without constant attention obtain sitter and review sitter guidelines with assigned personnel  7.  Initiate Psychosocial CNS and Spiritual Care consult, as indicated  12/25/2022 0214 by Giovanni Umana RN  Outcome: Progressing     Problem: ABCDS Injury Assessment  Goal: Absence of physical injury  12/25/2022 0214 by Giovanni Umana RN  Outcome: Progressing     Problem: Nutrition Deficit:  Goal: Optimize nutritional status  12/25/2022 0214 by Ady Todd RN  Outcome: Progressing     Problem: Safety - Adult  Goal: Free from fall injury  12/25/2022 0214 by Ady Todd RN  Outcome: Progressing  12/24/2022 1553 by Kd Kulkarni LPN  Outcome: Not Progressing     Problem: Confusion  Goal: Confusion, delirium, dementia, or psychosis is improved or at baseline  Description: INTERVENTIONS:  1. Assess for possible contributors to thought disturbance, including medications, impaired vision or hearing, underlying metabolic abnormalities, dehydration, psychiatric diagnoses, and notify attending LIP  2. [place] high risk fall precautions, as indicated  3. Provide frequent short contacts to provide reality reorientation, refocusing and direction  4. Decrease environmental stimuli, including noise as appropriate  5. Monitor and intervene to maintain adequate nutrition, hydration, elimination, sleep and activity  6. If unable to ensure safety without constant attention obtain sitter and review sitter guidelines with assigned personnel  7.  Initiate Psychosocial CNS and Spiritual Care consult, as indicated  12/25/2022 0214 by Ady Todd RN  Outcome: Progressing  12/24/2022 1553 by Kd Kulkarni LPN  Outcome: Not Progressing  12/24/2022 1552 by Kd Kulkarni LPN  Flowsheets (Taken 12/24/2022 8323)  Effect of thought disturbance (confusion, delirium, dementia, or psychosis) are managed with adequate functional status:   Assess for contributors to thought disturbance, including medications, impaired vision or hearing, underlying metabolic abnormalities, dehydration, psychiatric diagnoses, notify LIP   Monitor and intervene to maintain adequate nutrition, hydration, elimination, sleep and activity

## 2022-12-25 NOTE — PROGRESS NOTES
Pharmacy Note  Warfarin Consult follow-up      Recent Labs     12/23/22  0609 12/24/22  0926 12/25/22  0619   INR 2.4 2.3 2.6     Recent Labs     12/23/22  0609   HGB 12.0*   HCT 35.1*          Significant Drug-Drug Interactions:  New warfarin drug-drug interactions: none   Discontinued drug-drug interactions: none   Current warfarin drug-drug interactions: lexapro, rasagiline, sinemet       Date             INR        Dose given previous day  Dose scheduled for today  12/25/2022            2.6 (goal 2-3)       5 mg            5 mg     Notes:                   INR therapeutic today at 2.6 (goal 2-3). Continue with 5 mg. Daily PT/INR while inpatient.      Socorro Soliman, PharmD, BCPS  12/25/2022 6:43 AM

## 2022-12-26 LAB
INR BLD: 2.7
PROTHROMBIN TIME: 28.2 SEC (ref 11.8–14.6)

## 2022-12-26 PROCEDURE — 6370000000 HC RX 637 (ALT 250 FOR IP): Performed by: STUDENT IN AN ORGANIZED HEALTH CARE EDUCATION/TRAINING PROGRAM

## 2022-12-26 PROCEDURE — 97110 THERAPEUTIC EXERCISES: CPT

## 2022-12-26 PROCEDURE — 85610 PROTHROMBIN TIME: CPT

## 2022-12-26 PROCEDURE — 97530 THERAPEUTIC ACTIVITIES: CPT

## 2022-12-26 PROCEDURE — 97129 THER IVNTJ 1ST 15 MIN: CPT

## 2022-12-26 PROCEDURE — 97535 SELF CARE MNGMENT TRAINING: CPT

## 2022-12-26 PROCEDURE — 6370000000 HC RX 637 (ALT 250 FOR IP): Performed by: PSYCHIATRY & NEUROLOGY

## 2022-12-26 PROCEDURE — 97116 GAIT TRAINING THERAPY: CPT

## 2022-12-26 PROCEDURE — 6370000000 HC RX 637 (ALT 250 FOR IP): Performed by: PHYSICAL MEDICINE & REHABILITATION

## 2022-12-26 PROCEDURE — 92507 TX SP LANG VOICE COMM INDIV: CPT

## 2022-12-26 PROCEDURE — 99232 SBSQ HOSP IP/OBS MODERATE 35: CPT | Performed by: INTERNAL MEDICINE

## 2022-12-26 PROCEDURE — 1180000000 HC REHAB R&B

## 2022-12-26 PROCEDURE — 99231 SBSQ HOSP IP/OBS SF/LOW 25: CPT | Performed by: STUDENT IN AN ORGANIZED HEALTH CARE EDUCATION/TRAINING PROGRAM

## 2022-12-26 PROCEDURE — 36415 COLL VENOUS BLD VENIPUNCTURE: CPT

## 2022-12-26 RX ORDER — WARFARIN SODIUM 5 MG/1
5 TABLET ORAL
Status: COMPLETED | OUTPATIENT
Start: 2022-12-26 | End: 2022-12-26

## 2022-12-26 RX ADMIN — POLYETHYLENE GLYCOL 3350 17 G: 17 POWDER, FOR SOLUTION ORAL at 07:56

## 2022-12-26 RX ADMIN — DIPHENHYDRAMINE HYDROCHLORIDE, ZINC ACETATE: 2; .1 CREAM TOPICAL at 20:03

## 2022-12-26 RX ADMIN — CARBIDOPA AND LEVODOPA 1 TABLET: 25; 100 TABLET, EXTENDED RELEASE ORAL at 07:56

## 2022-12-26 RX ADMIN — DIPHENHYDRAMINE HYDROCHLORIDE, ZINC ACETATE: 2; .1 CREAM TOPICAL at 08:55

## 2022-12-26 RX ADMIN — CARBIDOPA AND LEVODOPA 1 TABLET: 25; 100 TABLET ORAL at 20:04

## 2022-12-26 RX ADMIN — RASAGILINE 1 MG: 1 TABLET ORAL at 07:56

## 2022-12-26 RX ADMIN — SENNOSIDES 17.2 MG: 8.6 TABLET, FILM COATED ORAL at 20:03

## 2022-12-26 RX ADMIN — CARBIDOPA AND LEVODOPA 1 TABLET: 25; 100 TABLET, EXTENDED RELEASE ORAL at 20:03

## 2022-12-26 RX ADMIN — ESCITALOPRAM OXALATE 20 MG: 10 TABLET ORAL at 07:56

## 2022-12-26 RX ADMIN — WARFARIN SODIUM 5 MG: 5 TABLET ORAL at 17:42

## 2022-12-26 RX ADMIN — CARBIDOPA AND LEVODOPA 1 TABLET: 25; 100 TABLET ORAL at 07:56

## 2022-12-26 RX ADMIN — QUETIAPINE FUMARATE 25 MG: 25 TABLET ORAL at 20:03

## 2022-12-26 ASSESSMENT — PAIN SCALES - GENERAL: PAINLEVEL_OUTOF10: 0

## 2022-12-26 ASSESSMENT — ENCOUNTER SYMPTOMS
GASTROINTESTINAL NEGATIVE: 1
ALLERGIC/IMMUNOLOGIC NEGATIVE: 1
RESPIRATORY NEGATIVE: 1
EYES NEGATIVE: 1
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
## 2022-12-26 NOTE — PLAN OF CARE
Problem: Discharge Planning  Goal: Discharge to home or other facility with appropriate resources  12/26/2022 0310 by Arina Luna RN  Outcome: Progressing     Problem: Skin/Tissue Integrity  Goal: Absence of new skin breakdown  Description: 1. Monitor for areas of redness and/or skin breakdown  2. Assess vascular access sites hourly  3. Every 4-6 hours minimum:  Change oxygen saturation probe site  4. Every 4-6 hours:  If on nasal continuous positive airway pressure, respiratory therapy assess nares and determine need for appliance change or resting period.   12/26/2022 0310 by Arina Luna RN  Outcome: Progressing     Problem: Safety - Adult  Goal: Free from fall injury  12/26/2022 0310 by Arina Luna RN  Outcome: Progressing     Problem: Pain  Goal: Verbalizes/displays adequate comfort level or baseline comfort level  12/26/2022 0310 by Arina Luna RN  Outcome: Progressing     Problem: ABCDS Injury Assessment  Goal: Absence of physical injury  12/26/2022 0310 by Arina Luna RN  Outcome: Progressing     Problem: Nutrition Deficit:  Goal: Optimize nutritional status  12/26/2022 0310 by Arina Luna RN  Outcome: Progressing

## 2022-12-26 NOTE — PROGRESS NOTES
Physical Therapy  [facility]  Rehabilitation Physical Therapy     NAME: Kristofer Burleson  : 1955 (79 y.o.)  MRN: [id number]  CODE STATUS: Full Code    Date of Service: 22      Past Medical History:   Diagnosis Date    Anxiety     Asthma     Atrial fibrillation (HCC)     Back pain     BPH (benign prostatic hyperplasia)     Dental disease     Depression     Diarrhea     MALONE (dyspnea on exertion)     Frequent falls     GERD (gastroesophageal reflux disease)     Memory loss     Obesity     Panic disorder     Parkinson's disease ([address]     Testicular cancer ([facility] [address]     Visual impairment      Past Surgical History:   Procedure Laterality Date    BRAIN SURGERY      Neurostimulator implant for Parkinson's    BROW LIFT      CARPAL TUNNEL RELEASE Right     CHOLECYSTECTOMY      COLONOSCOPY      CYSTOSCOPY      HERNIA REPAIR      LUMBAR LAMINECTOMY      LYMPH NODE DISSECTION      ORCHIECTOMY      SHOULDER ARTHROSCOPY Right     TOTAL KNEE ARTHROPLASTY Bilateral     TURP      WRIST SURGERY Left     cyst removal       Chart Reviewed: Yes  Patient assessed for rehabilitation services?: Yes  Additional Pertinent Hx: Sowmya Jiang is a 79 y.o. male With medical history of Atrial Fib and parkinsons disease who presented to undergo spinal surgery to remediate L1-2 stenosis and degenerative spondylolisthesis at L3-4 and presents with post-op back pain from spinal cord compression s/p laminectomy of L1-4 and PLIF L3-4 performed by Dr. Edwin Hoffman at [facility] on 22 . The patient admits to  [facility] / Caregiver Present: No  Referring Practitioner: Dr. Tano Page  Referral Date : 12/15/22  Diagnosis: Spinal Cord Compression  General Comment  Comments: Jacque Job per Nurse Dany Farooq to proceed with therapy assessments    Restrictions:  Restrictions/Precautions: Surgical Protocols; Fall Risk;General Precautions; Up as Tolerated  Required Braces or Orthoses  Spinal: Lumbar Corset  Position Activity Restriction  Spinal Precautions: Avoid Excessive Bending, Lifting, and Twisting of spine  Other position/activity restrictions: LSO to be donned when OOB; [id number] Deepti Garcia per Dr. Meliza Velarde to shower POD #7 (Sx date: 12/7/22). Order clarified on 12/20/22- Per Dr. Clair Blackburn use back brace for comfort. It is okay to remove to shower. SUBJECTIVE  Subjective: pt is agreeable to therapy;            OBJECTIVE                       Functional Mobility  Bed mobility  Bridging: Stand by assistance  Rolling to Left: Stand by assistance  Rolling to Right: Stand by assistance  Supine to Sit: Stand by assistance  Sit to Supine: Stand by assistance  Scooting: Minimal assistance  Bed Mobility Comments: from flat mat surface with 3 pillows  Transfers  Sit to Stand: Contact guard assistance;Stand by assistance  Stand to Sit: Stand by assistance;Contact guard assistance  Bed to Chair: Stand by assistance;Contact guard assistance  Stand Pivot Transfers: Stand by assistance;Contact guard assistance    Environmental Mobility  Ambulation  Surface: Level tile; Ramp  Device: Rolling Walker  Assistance: Contact guard assistance  Quality of Gait: heavy flexed at knees; freezing with turns  Gait Deviations: Decreased step height;Decreased step length;Decreased head and trunk rotation; Slow Katey; Shuffles (on RLE)  Distance: 110 feet x 5  Comments: worked on transfer and ambulation at bedside for technique safety from varied surfaces and surfaces height. More Ambulation?: Yes  Ambulation 2  Surface - 2: level tile  Device 2: No device  Assistance 2: Minimal assistance  Quality of Gait 2: forward trunk, very NBOS, unsteady, intermittent scissor gait (especially during turning), Flatfoot initial contact bilaterally, and difficulty with turns, freezing at times with floor change and directional change  Distance: 120 feet- increased SOB; improved gait deviations without device but patient with increased safety concern with freezing for balance control.   Comments: good arm swing with increase lesley with increased step length  Stairs  # Steps : 15  Stairs Height: 8\"  Rails: Right ascending  Curbs: 8\"  Device: Rolling walker  Assistance: Minimal assistance  Comment: patient has 2 -8\" steps with door on Right side for \"support\" per spouse. spouse manages walker only and patient demonstrates steps unassisted at home. ASSESSMENT       Activity Tolerance  Activity Tolerance: Patient tolerated treatment well       GOALS  Patient Goals   Patient Goals : patient \"I havn't really thought that far\". Spouse \"Strengthen Legs and improve safety\"  Short Term Goals  Time Frame for Short Term Goals: 7 days  Short Term Goal 1: pt to demo all Bed mobility with Rajni on flattened bed  Short Term Goal 2: pt to perform transfers with RW and Rajni  Short Term Goal 3: pt to ambulate 150' with RW and CGAx1  Short Term Goal 4: pt to negotiate single platform step + 2 successive steps (No rails) with Rajni x1 to allow for safe home access  Short Term Goal 5: pt to verbally identify 3/3 spinal precautions to maximize safety and functional outcomes. Long Term Goals  Time Frame for Long Term Goals : Until D/C  Long Term Goal 1: pt to demo all Bed mobility on flattened bed with supervision  Long Term Goal 2: pt to perform transfers with RW and supervision  Long Term Goal 3: pt to ambulate 150' with RW and SBAx1  Long Term Goal 4: pt to negotiate single platform step + 2 successive steps (No rails) with SBA to allow for safe home access  Long Term Goal 5: pt to improve BLE strength by 1/2 MMG to improve safety with mobility  Additional Goals?: Yes  Long term goal 6: pt to improve Sitting and standing balance to FAIR or better to decrease risk for falls during mobility  Long term goal 7: pt to demo simulated or actual car transfer with SBA  Long term goal 8: pt to negotiate FF of steps with Bilateral Rail and SBA to allow for safe access to basement level of home.     PLAN OF CARE  Frequency: 1-2 treatment sessions per day, 5-7 days per week            Therapy Time   12/26/22 1233 12/26/22 1635   PT Individual Minutes   Time In 0930 1403   Time Out 1032 1435   Minutes 62 [id number] [facility] [place], [facility], 12/26/22 at 5:19 PM

## 2022-12-26 NOTE — PROGRESS NOTES
Pharmacy Note  Warfarin Consult follow-up      Recent Labs     12/24/22  0926 12/25/22  0619 12/26/22  0525   INR 2.3 2.6 2.7     No results for input(s): HGB, HCT, PLT in the last 72 hours. Significant Drug-Drug Interactions:  New warfarin drug-drug interactions: none  Discontinued drug-drug interactions: none  Current warfarin drug-drug interactions: sinemet, lexapro, rasagiline      Date             INR        Dose given previous day  Dose scheduled for today  12/26/2022            2.7       5 mg           5 mg        Notes:                     Daily PT/INR while inpatient.      Marquez Fuller RPH,PharmD,  12/26/2022, 10:23 AM

## 2022-12-26 NOTE — PLAN OF CARE
Problem: Discharge Planning  Goal: Discharge to home or other facility with appropriate resources  12/26/2022 1345 by Froy Gipson RN  Outcome: Progressing  Flowsheets (Taken 12/26/2022 1317)  Discharge to home or other facility with appropriate resources:   Identify barriers to discharge with patient and caregiver   Identify discharge learning needs (meds, wound care, etc)     Problem: Skin/Tissue Integrity  Goal: Absence of new skin breakdown  Description: 1. Monitor for areas of redness and/or skin breakdown  2. Assess vascular access sites hourly  3. Every 4-6 hours minimum:  Change oxygen saturation probe site  4. Every 4-6 hours:  If on nasal continuous positive airway pressure, respiratory therapy assess nares and determine need for appliance change or resting period. 12/26/2022 1345 by Froy Gipson RN  Outcome: Progressing     Problem: Safety - Adult  Goal: Free from fall injury  12/26/2022 1345 by Froy Gipson RN  Outcome: Progressing     Problem: Pain  Goal: Verbalizes/displays adequate comfort level or baseline comfort level  12/26/2022 1345 by Froy Gipson RN  Outcome: Progressing     Problem: Confusion  Goal: Confusion, delirium, dementia, or psychosis is improved or at baseline  Description: INTERVENTIONS:  1. Assess for possible contributors to thought disturbance, including medications, impaired vision or hearing, underlying metabolic abnormalities, dehydration, psychiatric diagnoses, and notify attending LIP  2. [place] high risk fall precautions, as indicated  3. Provide frequent short contacts to provide reality reorientation, refocusing and direction  4. Decrease environmental stimuli, including noise as appropriate  5. Monitor and intervene to maintain adequate nutrition, hydration, elimination, sleep and activity  6. If unable to ensure safety without constant attention obtain sitter and review sitter guidelines with assigned personnel  7.  Initiate Psychosocial CNS and Spiritual Care consult, as indicated  12/26/2022 1345 by Alivia Sandoval RN  Outcome: Progressing  Flowsheets (Taken 12/26/2022 1317)  Effect of thought disturbance (confusion, delirium, dementia, or psychosis) are managed with adequate functional status:   Assess for contributors to thought disturbance, including medications, impaired vision or hearing, underlying metabolic abnormalities, dehydration, psychiatric diagnoses, notify [facility] high risk fall precautions, as indicated   Provide frequent short contacts to provide reality reorientation, refocusing and direction     Problem: ABCDS Injury Assessment  Goal: Absence of physical injury  12/26/2022 1345 by Alivia Sandoval, RN  Outcome: Progressing     Problem: Nutrition Deficit:  Goal: Optimize nutritional status  12/26/2022 1345 by Alivia Sandoval, RN  Outcome: Progressing

## 2022-12-26 NOTE — PROGRESS NOTES
[facility]   Acute Rehabilitation Occupational Therapy Daily Treatment Note    Date: 22  Patient Name: Willow Dasilva       Room: 7199/7340-08  MRN: [id number]  Account: [de-identified]   : 1955  (78 y.o.) Gender: male       Referring Practitioner: Ruth Mora MD  Diagnosis: Cord compression s/p surgical decompression/fusion L1-4  Additional Pertinent Hx: Patient with spinal cord compression who had L1-4 laminectomy and fusion performed by Dr. Damon Martino formarked L1-2 stenosis and degenerative spondylolisthesis at L3-4 on 22. Medical records indicate some post-op confusion. Pt admitted to ARU on 12/15/22. Treatment Diagnosis: Impaired self care status    Past Medical History:  has a past medical history of Anxiety, Asthma, Atrial fibrillation ([address] Back pain, BPH (benign prostatic hyperplasia), Dental disease, Depression, Diarrhea, MALONE (dyspnea on exertion), Frequent falls, GERD (gastroesophageal reflux disease), Memory loss, Obesity, Panic disorder, Parkinson's disease ([address] Testicular cancer ([address] and Visual impairment. Past Surgical History:   has a past surgical history that includes Cholecystectomy; hernia repair; Total knee arthroplasty (Bilateral); lumbar laminectomy; Orchiectomy; brow lift; Carpal tunnel release (Right); Shoulder arthroscopy (Right); Wrist surgery (Left); Colonoscopy; Cystoscopy; brain surgery; TURP; and lymph node dissection. Restrictions  Restrictions/Precautions  Restrictions/Precautions: Surgical Protocols, Fall Risk, General Precautions, Up as Tolerated  Required Braces or Orthoses?: Yes  Implants present? : Metal implants, Deep brain stimulator  Required Braces or Orthoses  Spinal: Lumbar Corset  Position Activity Restriction  Spinal Precautions: Avoid Excessive Bending, Lifting, and Twisting of spine  Other position/activity restrictions: LSO to be donned when OOB; Pedro Ready per Dr. Mickey Hernandez to shower POD #7 (Sx date: 22).  Order clarified on 12/20/22- Per Dr. Mariah Yates use back brace for comfort. It is okay to remove to shower. Subjective  Subjective  Subjective: \"Oh, it's going\" Pt states when asked how his morning is going today. Pain Assessment  Pain Assessment: None - Denies Pain    Objective  Cognition  Overall Orientation Status: Within Functional Limits       Cognition  Overall Cognitive Status: Exceptions  Arousal/Alertness: Delayed responses to stimuli  Following Commands: Follows one step commands with increased time; Follows one step commands with repetition  Attention Span: Difficulty attending to directions  Memory: Decreased short term memory;Decreased recall of precautions;Decreased recall of recent events  Safety Judgement: Decreased awareness of need for assistance;Decreased awareness of need for safety  Problem Solving: Assistance required to identify errors made;Assistance required to correct errors made;Assistance required to implement solutions;Assistance required to generate solutions;Decreased awareness of errors  Insights: Decreased awareness of deficits  Initiation: Requires cues for some  Sequencing: Requires cues for some  Cognition Comment: Pt's Wife Jenelle Alamo states that pt's cognition and memory is near baseline; Hx of parkinson's disease Dx'd 10-15 years ago    Activities of Daily Living  Upper Extremity Bathing  Assistance Level: Supervision  Skilled Clinical Factors: seated on tub bench    Lower Extremity Bathing  Assistance Level: Contact guard assist  Skilled Clinical Factors: SUP for washing BLEs while seated on tub bench, CGA standing at GB to wash buttocks. VC for sit for rinsing off for decreased fall risk. Upper Extremity Dressing  Assistance Level: Stand by assist;Increased time to complete  Skilled Clinical Factors: CGA pulling down in back 2* dampness. Assist drying back off thoroughly. Increaseed time to complete.     Lower Extremity Dressing  Assistance Level: Contact guard assist  Skilled Clinical Factors: Pt able to doff/ashley underwear and pants withincreased time, CGA for orienting and CGA for pulling over hips as pt stands impulsivly. Putting On/Taking Off Footwear  Assistance Level: Minimal assistance  Skilled Clinical Factors: Pt able to doff B socks. Able to ashley R sock with VC for using figure four tech. Requires A for L sock. Toileting  Assistance Level: Contact guard assist  Skilled Clinical Factors: CGA/SBA during clothing management before and after transferring to toilet. Voiding only this AM.    Toilet Transfers  Technique:  (Ambulating with RW)  Equipment: Standard toilet;Grab bars  Assistance Level: Stand by assist  Skilled Clinical Factors: Close SBA for safety. Tub/Shower Transfers  Type: Shower  Transfer From: Rolling walker  Transfer To: Tub transfer bench  Assistance Level: Contact guard assist  Skilled Clinical Factors: Verbal cuing for safe technique with RW while maneuvering over threshold into shower. Mobility  Supine to Sit  Assistance Level: Stand by assist  Skilled Clinical Factors: Head of bed elevated. and rail use c extended time  Scooting  Assistance Level: Supervision  Skilled Clinical Factors: rail use. requires extended time       Sit to Stand  Assistance Level: Contact guard assist  Skilled Clinical Factors: CGA/SBA- VCs for hand placement and postural alignment. max VCs for WC safety. locking brakes prior to transfer. P return  Stand to Sit  Assistance Level: Contact guard assist  Skilled Clinical Factors: CGA/SBA- cuing for hand placement and controlled descend. Functional Mobility  Device: Rolling walker  Activity: To/From bathroom  Assistance Level: Contact guard assist  Skilled Clinical Factors: CGA during mobility overall- occasional VC provided to correct posterior lean. pt impulsive.  no LOB noted    OT Exercises  Exercise Treatment: Instruction and participation in B UE str exercises with 2# free wt, 15-20 reps in all planes, rest breaks taken as needed, to continue to increase general str and activity tolerance for maximized indep and safety during all daily self care and leisure activities. Difficulty noted with R shoulder movements 2* baseline decreased ROM. Dynamic Standing Balance Exercises: PM: Pt instruction in dynamic standing to engage in bean bag toss for increased balance and safety with ADLs. Pt completes for ~1 minute X4 trials, intermittent CGA with no gross LOB noted. VC for safety and pacing with task. Assessment  Assessment  Activity Tolerance: Patient tolerated treatment well  Discharge Recommendations: 24 hour supervision or assist;Home with Home health OT    Patient Education  Education  Education Given To: Patient; Family  Education Provided: Role of Therapy;Plan of Care;Safety;ADL Function;Cognition; Fall Prevention Strategies;DME/Home Modifications  Education Method: Demonstration;Verbal  Barriers to Learning: Cognition  Education Outcome: Verbalized understanding;Demonstrated understanding;Continued education needed    OT Equipment Recommendations  Other: Pt and pt spouse reports having walk in shower, shower bench, and grab bars in shower and around commode at private residence. That RW will fit into bathroom. Safety Devices  Safety Devices in place: Yes  Type of devices: Left in bed;Bed alarm in place;Call light within reach (Telesitter. Spouse in room at end of session.)       Goals  Patient Goals   Patient goals : \"I would like to be able to get up and go so I can go outside and do some repairs and go downstairs and reload some guerrero. Do the things I normally do. \"  Short Term Goals  Time Frame for Short Term Goals: By 1 week  Short Term Goal 1: Pt will complete upper body dressing/bathing with Min A and Good attention/safety while maintaining back precautions  Short Term Goal 2: Pt will complete lower body dressing/bathing with Mod A and Good safety with use of AE as needed while maintaining back precautions  Short Term Goal 3: Pt will complete funcitonal transfers/mobility during self care tasks with Min A and Good safety with use of least restrictive device  Short Term Goal 4: Pt will tolerate standing 4+ minutes during self care tasks with Min A and Good safety  Short Term Goal 5: Pt will verbalize/demonstrate understanding of back precautions during daily activities 80% accuracy  Short Term Goal 6: Pt will participate in 30+ minutes during therapeutic exercises/functional activities to increase safety and independence with self care and mobility    Long Term Goals  Time Frame for Long Term Goals : By discharge  Long Term Goal 1: Pt will complete BADLs with SBA and Good attention/safety to task while maintaining back precautions  Long Term Goal 2: Pt will complete functional transfers/mobility during self care taks with SBA and Good safety with use of least restrictive device  Long Term Goal 3: Pt will tolerate standing 8+ minutes during self care tasks with SBA and Good safety  Long Term Goal 4: Pt will demonstrate increased [address] and strength during self care tasks as evident by 5# improvement in  strength and 15 second improvement on 9 hole peg test.  Long Term Goal 5: Pt will demonstrate sitting EOB 20+ minutes during self care tasks while maintaining appropriate midline with SBA  Long Term Goal 6: Pt/family will verbalize/demonstrate of home safety/fall prevention strategies to increase safety and independence with self care and mobility    Plan  Occupational Therapy Plan  Times Per Week: 5-7  Times Per Day: Twice a day  Current Treatment Recommendations: Self-Care / ADL, Strengthening, ROM, Balance training, Functional mobility training, Endurance training, Cognitive reorientation, Pain management, Safety education & training, Patient/Caregiver education & training, Equipment evaluation, education, & procurement, Home management training, Coordination training, Cognitive/Perceptual training       12/26/22 0801 12/26/22 1533   OT Individual Minutes   Time In [address]   Time Out [phone]   [id number]         Electronically signed by KIM Jessica on 12/26/22 at 3:34 PM EST

## 2022-12-26 NOTE — PROGRESS NOTES
Speech Language Pathology  Speech Language Pathology  [facility] Acute Rehab Unit at [facility]    Speech Language/cognitive Treatment Note    Date: 2022  Patients Name: Amanda Castillo  MRN: [id number]  Diagnosis:   Patient Active Problem List   Diagnosis Code    Atrial fibrillation [facility]) I48.91    Chronic back pain M54.9, G89.29    Erectile dysfunction N52.9    Gait disorder R26.9    Hypertension I10    Lower urinary tract symptoms R39.9    Malignant neoplasm of testis (HCC) C62.90    Osteoarthrosis M19.90    Parkinson disease ([address] G20    Peripheral nerve disease G62.9    Cord compression ([address] G95.20    Moderate malnutrition ([address] E44.0    Spinal stenosis of lumbar region M48.061       Pain: no c/o pain    Speech and Language Treatment  Treatment time: 9145-8642    Subjective: [x] Alert [x] Cooperative     [] Confused     [] Agitated    [] Lethargic      Objective/Assessment:    Speech: Pt demonstrated mild dysarthria characterized by rapid rate of speech, imprecise articulation and reduced intensity. Intelligibility rated at 85%. Pt educated re: continued use of compensatory dysarthria strategies (slow rate, over articulation, increased loudness). Intelligibility improved when cued for repetition. Cognition: Recalled 2/4 target items from morning therapy sessions with MI (extra time), improved to 3/4 with mod verbal cues. Pt repeated previous responses several times. Abstract namin% with mod A to name 5+ items, additional cueing resulted in minimal improvement. Long response latency observed. Other: Spouse presents during session and participated in cueing appropriately. Plan:  [x] Continue ST services    [] Discharge from ST:      Discharge recommendations: []  Further therapy recommended at discharge. The patient should be able to tolerate at least 3 hours of therapy per day over 5 days or 15 hours over 7 days. [] Further therapy recommended at discharge.    [] No therapy recommended at discharge.       Treatment completed by: Veena Marino M.S., CCC-SLP

## 2022-12-26 NOTE — PROGRESS NOTES
[facility] Internal Medicine  Anson Peng MD; Miguelito Dale MD; Jhonatan Davis MD; MD Aditya Leiva MD; Yolanda Burns MD    [facility] Internal Medicine   [facility]    HISTORY AND PHYSICAL EXAMINATION            Date:   12/26/2022  Patient name:  Marlen Hwang  Date of admission:  12/15/2022  5:57 PM  MRN:   [id number]  Account:  [de-identified]  YOB: 1955  PCP:    Jose Juan Xiong MD  Room:   [facility]  Code Status:    Full Code    Chief Complaint:       Back pain from spinal stenosis s/p laminectomy    History Obtained From:     Patient and electronic medical record    History of Present Illness:     Marlen Hwang is a 79 y.o. Unavailable / unknown male who has past medical history of a . Fib and parkinson presents with back pain from spinal cord compression s/p laminectomy. He is admitted for acute rehabilitation. Laminectomy done at [facility] 1 week back. Currently patient denied back pain. He dose have weakness in bilateral lower extremity 4/5.      Past Medical History:     Past Medical History:   Diagnosis Date    Anxiety     Asthma     Atrial fibrillation (HCC)     Back pain     BPH (benign prostatic hyperplasia)     Dental disease     Depression     Diarrhea     MALONE (dyspnea on exertion)     Frequent falls     GERD (gastroesophageal reflux disease)     Memory loss     Obesity     Panic disorder     Parkinson's disease ([address]     Testicular cancer ([address]     Visual impairment         Past Surgical History:     Past Surgical History:   Procedure Laterality Date    BRAIN SURGERY      Neurostimulator implant for Parkinson's    BROW LIFT      CARPAL TUNNEL RELEASE Right     CHOLECYSTECTOMY      COLONOSCOPY      CYSTOSCOPY      HERNIA REPAIR      LUMBAR LAMINECTOMY      LYMPH NODE DISSECTION      ORCHIECTOMY      SHOULDER ARTHROSCOPY Right     TOTAL KNEE ARTHROPLASTY Bilateral     TURP      WRIST SURGERY Left     cyst removal        Medications Prior to Admission:     Prior to Admission medications    Not on File        Allergies:     Patient has no known allergies. Social History:     Tobacco:    reports that he has never smoked. He has never used smokeless tobacco.  Alcohol:      has no history on file for alcohol use. Drug Use:  has no history on file for drug use. Family History:     Family History   Problem Relation Age of Onset    Other Mother     Heart Failure Father     Other Sister     Behavior Problems Sister     No Known Problems Brother        Review of Systems:     Review of Systems   Constitutional: Negative. HENT: Negative. Eyes: Negative. Respiratory: Negative. Cardiovascular: Negative. Gastrointestinal: Negative. Endocrine: Negative. Genitourinary: Negative. Musculoskeletal: Negative. Bilateral lower extremity weakness. Skin: Negative. Allergic/Immunologic: Negative. Neurological: Negative. Hematological: Negative. Psychiatric/Behavioral: Negative. Alexus Carrion Physical Exam:     Physical Exam   Vitals:    Vitals:    22 1847 22 1201 22 1815 22 0600   BP: (!) 155/83  138/84 113/76   Pulse: 73  72 78   Resp: 18  16 12   Temp: 99.1 °F (37.3 °C)  98 °F (36.7 °C) 97.3 °F (36.3 °C)   TempSrc:   Oral Oral   SpO2: 100% 96%  93%   Weight:       Height:                        Body mass index is 32.75 kg/m². Temp (24hrs), Av.7 °F (36.5 °C), Min:97.3 °F (36.3 °C), Max:98 °F (36.7 °C)    No results for input(s): POCGLU in the last 72 hours. General Appearance:   well apearing             Skin:                             No rash or erythema  HEENT ;                                                                       No icterus, no pallor . No ptosis.     No gross asymmetry  Or abnormality face         Neck:                            No mass , no thyroid enlargement Pulmonary/Chest:                                               Symmetric                                          Clear to auscultation bilaterally . No wheezes,                                          No rales or rhonchi . No abnormality on percussion                                                        Cardiovascular:            Normal rate, regular rhythm,                                          No murmur or  Gallop . Abdomen:                       Soft, non-tender                                           Normal bowels sounds,                                             Extremities:                    normal sensation.  Power 4/5 in bilateral lower extremities                                           Musculo-skeletal / Neurological ;;                                                                                               Investigations:      URINE ANALYSIS: No results found for: LABURIN     CBC:  Lab Results   Component Value Date/Time    WBC 5.2 12/23/2022 06:09 AM    HGB 12.0 12/23/2022 06:09 AM     12/23/2022 06:09 AM             BMP:    Lab Results   Component Value Date/Time     12/23/2022 06:09 AM    K 4.3 12/23/2022 06:09 AM     12/23/2022 06:09 AM    CO2 25 12/23/2022 06:09 AM    BUN 22 12/23/2022 06:09 AM    CREATININE 0.76 12/23/2022 06:09 AM    GLUCOSE 93 12/23/2022 06:09 AM      LIVER PROFILE:  Lab Results   Component Value Date/Time    ALT 44 12/16/2022 06:21 AM    AST 35 12/16/2022 06:21 AM    PROT 7.0 12/16/2022 06:21 AM    BILITOT 0.7 12/16/2022 06:21 AM    BILIDIR 0.2 12/16/2022 06:21 AM    LABALBU 4.0 12/16/2022 06:21 AM             @BRIEFLABT(TSH)@      Laboratory Testing:  Recent Results (from the past 24 hour(s))   Protime-INR    Collection Time: 12/26/22  5:25 AM   Result Value Ref Range    Protime 28.2 (H) 11.8 - 14.6 sec    INR 2.7          Imaging/Diagnostics:  No results found. Assessment :      Hospital Problems             Last Modified POA    * (Principal) Cord compression [address] 12/15/2022 Yes    Moderate malnutrition ([address] 12/16/2022 Yes    Spinal stenosis of lumbar region 12/24/2022 Yes   Alysha Domínguez is a 79 y.o. Unavailable / unknown male who has past medical history of a . Fib and parkinson presents with back pain from spinal cord compression s/p laminectomy. He is admitted for acute rehabilitation. Laminectomy done at [facility] 1 week back. Plan:       Medications: Allergies:  No Known Allergies    Current Meds:   Scheduled Meds:    QUEtiapine  25 mg Oral Nightly    polyethylene glycol  17 g Oral Daily    senna  2 tablet Oral Nightly    Opicapone  50 mg Oral Nightly    mirabegron  50 mg Oral Nightly    carbidopa-levodopa  1 tablet Oral BID    carbidopa-levodopa  1 tablet Oral BID    escitalopram  20 mg Oral Daily    rasagiline mesylate  1 mg Oral Daily    warfarin placeholder: dosing by pharmacy   Other RX Placeholder     Continuous Infusions:   PRN Meds: oxyCODONE-acetaminophen, diphenhydrAMINE-zinc acetate, tiZANidine, acetaminophen, bisacodyl          12/26/2022    Admitted for acute rehab after laminectomy for spinal stenosis   Hemodynamically stable, saturating well in room air. Continue physical therapy and occupation therapy. 12/19/22    Patient tolerating rehabilitative therapies . Progressing fairly well . Continue present therapy . 12/26  Patient advancing well with physical therapy  Hypertension, controlled  On Coumadin,last  INR is more than 2  History of Parkinson disease on Sinemet  Family very  happy with his recovery     MD LOLY Fernando [address], [address] [place] [address].    Phone [phone]   Fax: [phone]  Answering Service: [phone]            12/26/2022  5:47 PM    Copy sent to Dr. Alex Jackman MD    Please note that this chart was generated using voice recognition Dragon dictation software. Although every effort was made to ensure the accuracy of this automated transcription, some errors in transcription may have occurred.

## 2022-12-26 NOTE — PATIENT CARE CONFERENCE
[facility] Acute Inpatient Rehabilitation  TEAM CONFERENCE NOTE  Date: 22  Patient Name: Kamille Heading       Room: 9024/5102-74  MRN: [id number]       : 1955  (78 y.o.)     Gender: male   Referring Practitioner: Dr. Prabhu Jiang   Unspecified cord compression [G95.20]  Cord compression [facility]) [G95.20]  Diagnosis: Cord compression s/p surgical decompression/fusion L1-4     NURSING    Bladder Continence  Always Continent  Bowel Continence Always Continent    Date of Last BM: 22    Bladder/Bowel Program Interventions:  Both Bowel & Bladder Program In Place     Wounds/Incisions/Ulcers: Incision healing well to medial lower back- Staples removed  -Scattered bruising   -Redness on heels (encouraging patient to elevate)    Pain Control: No pain concerns to address    Pain Medication Regimen Usage Pattern: MAR reviewed and pain medications are being used at the following frequency (Specify Medication, # Doses Administered on average per day, identified patterns of use - for example: time of day, prior to activity/therapy)  -Tylenol q4h PRN-hasn't taken since admission  -Percocet 5-325 2 tablet q6h- hasn't taken since admission    Fall Risk:  Falling star program initiated    Medication Education Program: Patient currently unable to manage medications and responsible party being educated    Discharge Preparation Patient/Responsible Party Education In Progress:   No Educational Needs Identified    Nursing specific communication for TEAM: No additional information identified requiring communication at this time    PHYSICAL THERAPY  Bed mobility  Bridging: Stand by assistance  Rolling to Left: Stand by assistance  Rolling to Right: Stand by assistance  Supine to Sit: Stand by assistance  Sit to Supine: Stand by assistance  Scooting: Minimal assistance  Bed Mobility Comments: from flat mat surface with 3 pillows    Transfers:  Sit to Stand: Contact guard assistance;Stand by assistance  Stand to Sit: Stand by assistance;Contact guard assistance  Bed to Chair: Stand by assistance;Contact guard assistance    Ambulation  Surface: Level tile; Ramp  Device: Rolling Walker  Assistance: Contact guard assistance  Quality of Gait: heavy flexed at knees; freezing with turns  Gait Deviations: Decreased step height;Decreased step length;Decreased head and trunk rotation; Slow Lesley; Shuffles (on RLE)  Distance: 110 feet x 5  Comments: worked on transfer and ambulation at bedside for technique safety from varied surfaces and surfaces height. More Ambulation?: Yes    Ambulation 2  Surface - 2: level tile  Device 2: No device  Assistance 2: Minimal assistance  Quality of Gait 2: forward trunk, very NBOS, unsteady, intermittent scissor gait (especially during turning), Flatfoot initial contact bilaterally, and difficulty with turns, freezing at times with floor change and directional change  Distance: 120 feet- increased SOB; improved gait deviations without device but patient with increased safety concern with freezing for balance control. Comments: good arm swing with increase lesley with increased step length    Stairs  # Steps : 15  Stairs Height: 8\"  Rails: Right ascending  Curbs: 8\"  Device: Rolling walker  Assistance: Minimal assistance  Comment: patient has 2 -8\" steps with door on Right side for \"support\" per spouse. spouse manages walker only and patient demonstrates steps unassisted at home.     PT Equipment Recommendations  Equipment Needed: Yes  Mobility Devices: Walker  Other: 2WW    Long Term Goals  Time Frame for Long Term Goals : Until D/C  Long Term Goal 1: pt to demo all Bed mobility on flattened bed with supervision  Long Term Goal 2: pt to perform transfers with RW and supervision  Long Term Goal 3: pt to ambulate 150' with RW and SBAx1  Long Term Goal 4: pt to negotiate single platform step + 2 successive steps (No rails) with SBA to allow for safe home access  Long Term Goal 5: pt to improve BLE strength by 1/2 MMG to improve safety with mobility  Additional Goals?: Yes  Long term goal 6: pt to improve Sitting and standing balance to FAIR or better to decrease risk for falls during mobility  Long term goal 7: pt to demo simulated or actual car transfer with SBA  Long term goal 8: pt to negotiate FF of steps with Bilateral Rail and SBA to allow for safe access to basement level of home. OCCUPATIONAL THERAPY  SELF CARE          Feeding  Assistance Level: Modified independent  Skilled Clinical Factors: per spouse and pt report          Grooming/Oral Hygiene  Assistance Level: Supervision  Skilled Clinical Factors: seated at sink        Upper Extremity Bathing  Assistance Level: Supervision  Skilled Clinical Factors: seated on tub bench   Lower Extremity Bathing  Equipment Provided: Long-handled sponge  Assistance Level: Contact guard assist  Skilled Clinical Factors: SUP for washing BLEs while seated on tub bench, CGA standing at GB to wash buttocks. VC for sit for rinsing off for decreased fall risk. Upper Extremity Dressing  Assistance Level: Stand by assist, Increased time to complete  Skilled Clinical Factors: CGA pulling down in back 2* dampness. Assist drying back off thoroughly. Increaseed time to complete. Lower Extremity Dressing  Equipment Provided: Reachers  Assistance Level: Contact guard assist  Skilled Clinical Factors: Pt able to doff/ashley underwear and pants withincreased time, CGA for orienting and CGA for pulling over hips as pt stands impulsivly. Putting On/Taking Off Footwear  Assistance Level: Minimal assistance  Skilled Clinical Factors: Pt able to doff B socks. Able to ashley R sock with VC for using figure four tech. Requires A for L sock. Toileting  Assistance Level: Contact guard assist  Skilled Clinical Factors: CGA/SBA during clothing management before and after transferring to toilet. Voiding only this AM. .  Pt. is able to complete toilet hygiene while seated by weight shifting (I). Toilet Transfers  Technique:  (Ambulating with RW)  Equipment: Standard toilet, Grab bars  Assistance Level: Stand by assist  Skilled Clinical Factors: Close SBA for safety. Short Term Goals  Time Frame for Short Term Goals: By 1 week  Short Term Goal 1: Pt will complete upper body dressing/bathing with Min A and Good attention/safety while maintaining back precautions  Short Term Goal 2: Pt will complete lower body dressing/bathing with Mod A and Good safety with use of AE as needed while maintaining back precautions  Short Term Goal 3: Pt will complete funcitonal transfers/mobility during self care tasks with Min A and Good safety with use of least restrictive device  Short Term Goal 4: Pt will tolerate standing 4+ minutes during self care tasks with Min A and Good safety  Short Term Goal 5: Pt will verbalize/demonstrate understanding of back precautions during daily activities 80% accuracy  Short Term Goal 6: Pt will participate in 30+ minutes during therapeutic exercises/functional activities to increase safety and independence with self care and mobility       SPEECH THERAPY  Supervision for comprehension; Min A for expression (dysarthria); Max A for problem solving; Mod A for memory  Short Term Goal: Mod I for comprehension; Supervision for expression; Mod A for problem solving; Min A for memory      NUTRITION  Weight: 241 lb 8 oz (109.5 kg) / Body mass index is 32.75 kg/m². Diet Rx: Regular. Glucerna twice daily. PO intake appears adequate with % intake at meals. Please see nutrition note for details.     CASE MANAGEMENT ASSESSMENT    Discharge Disposition: Home  Family Support: wife/ declines Guerline Joseph    PCP Established: [x] Yes [] No (If No: Specify Status of Designation)    Services Being Followed In Preparation For Discharge: (Check All That Apply)  [] [address] [place])  [x] Outpatient Therapy(Specify Location)  [] Dialysis   [] Hemodialysis (Specify Location/Days/Chair Times)   [] Peritoneal Dialysis  [] IV Infusion Services  [] Enteral Nutrition Services  [] Oxygen  [] Stoma/Ostomy  [] Catheter  [] Lovenox  [x] Coumadin      Patient Mood Interview PHQ-2 to 9 Score: 0    Pre-Admission Status:  Lives With: Spouse (Danay)  Type of Home: House  Home Layout: One level, Laundry in basement [employer] does laundry. Per Hector Nettleton he will access basement to use the computer)  Home Access: Stairs to enter without rails  Entrance Stairs - Number of Steps: 1 platform step + 2 Steps with no Railings to enter; To basement level has stairs with landing and Bilateral Railings (\"Steps are curved\" per spouse)  Bathroom Shower/Tub: Walk-in shower, Doors  Bathroom Toilet: Standard  Bathroom Equipment: Hand-held shower, Grab bars in shower, Built-in shower seat, Grab bars around toilet  Bathroom Accessibility:  (Not accessible by 8OP)  Home Equipment: Glenys Khalil, 4 wheeled, [employer], [place], [place], [place], KOGL bars, Walker, standard (brakes do not work on [employer])  Has the patient had two or more falls in the past year or any fall with injury in the past year?: Yes (\"too many to guess\" pt reports most falls occurring at home while \"trying to maneuver around [employer] Help From: Family, Neighbor  ADL Assistance: Needs assistance  Homemaking Assistance: Needs assistance (wife as primary)  Homemaking Responsibilities: No  Ambulation Assistance: Needs assistance (3NH; per Wife pt often times abandons walker. 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 Pt appears to be somewhat of a poor historian this date. Sahara Olmstead arrives to help verify information. Family Education: Family Education initiated and Ongoing    Percentage Risk for Readmission: Low 0 - 18%   Readmission Risk              Risk of Unplanned Readmission:  12       %    Critical Items: None       Problem / Barrier Intervention / Plan  Results   Impaired mobility 2* B LE weakness and Hx of parkinson's  Strengthening, balance activities, functional mobility training. dysarthria Compensatory strategies    Impaired cognition Cognitive retraining exercises    Altered ability to care for self Remediation and training in modified care strategies to increase safety and independence with self care tasks                     Total Self Care Score    Total Mobility Score  Admission Score:  15      Admission Score:  25  Goal:  30/42         Goal:  68/90    THERAPY MINUTE COMPLIANCE  Patient is participating and compliant with meeting therapy minutes as outlined in plan of care: Yes `    Discharge Plan   Estimated Discharge Date: 12/30/2022  Home evaluation needed?  Requires Re-evaluation  Overnight or Day Pass: No  Factors facilitating achievement of predicted outcomes: Family support, Motivated, and Has homemaker services  Barriers to the achievement of predicted outcomes: Impulsivity, Limited safety awareness, Limited insight into deficits, Decreased endurance, Lower extremity weakness, Medical complications, Stairs at home, and Medication managment    Functional Goals at discharge:  Predicted Outcome: Home with familyPATIENT'S LEVEL OF ASSISTANCE: 24 hour Supervision , Contact Guard / Touching Assistance, and Stand By Assistance   Discharge therapy goals:  PT: Long Term Goals  Time Frame for Long Term Goals : Until D/C  Long Term Goal 1: pt to demo all Bed mobility on flattened bed with supervision  Long Term Goal 2: pt to perform transfers with RW and supervision  Long Term Goal 3: pt to ambulate 150' with RW and SBAx1  Long Term Goal 4: pt to negotiate single platform step + 2 successive steps (No rails) with SBA to allow for safe home access  Long Term Goal 5: pt to improve BLE strength by 1/2 MMG to improve safety with mobility  Additional Goals?: Yes  Long term goal 6: pt to improve Sitting and standing balance to FAIR or better to decrease risk for falls during mobility  Long term goal 7: pt to demo simulated or actual car transfer with SBA  Long term goal 8: pt to negotiate FF of steps with Bilateral Rail and SBA to allow for safe access to basement level of home. OT:Long Term Goals  Time Frame for Long Term Goals : By discharge  Long Term Goal 1: Pt will complete BADLs with SBA and Good attention/safety to task while maintaining back precautions  Long Term Goal 2: Pt will complete functional transfers/mobility during self care taks with SBA and Good safety with use of least restrictive device  Long Term Goal 3: Pt will tolerate standing 8+ minutes during self care tasks with SBA and Good safety  Long Term Goal 4: Pt will demonstrate increased [address] and strength during self care tasks as evident by 5# improvement in  strength and 15 second improvement on 9 hole peg test.  Long Term Goal 5: Pt will demonstrate sitting EOB 20+ minutes during self care tasks while maintaining appropriate midline with SBA  Long Term Goal 6: Pt/family will verbalize/demonstrate of home safety/fall prevention strategies to increase safety and independence with self care and mobility  ST: Independent for comprehension;  Mod I for expression; Min A for problem solving; Supervision for memory     Participating Team Members:  /:  Jose Ontiveros RN  Occupational Therapist: Ashleigh Jennings OT    Physical Therapist: Doug Davis  PT  Speech Therapist:  Genesis Cervantes, [address]   Nurse: Sandrine Bingham Marycarmen Johnson   Dietary/Nutrition: Amalia Woods RD, LD  Pastoral Care: Evone Sandifer, Chaplain Madrigal Smoker  CMG: David Hardy, RN    I approve the established interdisciplinary plan of care as documented within the medical record of Taylor Angel.     Mao Zayas MD

## 2022-12-27 LAB
INR BLD: 3
PROTHROMBIN TIME: 31.1 SEC (ref 11.8–14.6)

## 2022-12-27 PROCEDURE — 36415 COLL VENOUS BLD VENIPUNCTURE: CPT

## 2022-12-27 PROCEDURE — 97535 SELF CARE MNGMENT TRAINING: CPT

## 2022-12-27 PROCEDURE — 6370000000 HC RX 637 (ALT 250 FOR IP): Performed by: PHYSICAL MEDICINE & REHABILITATION

## 2022-12-27 PROCEDURE — 97116 GAIT TRAINING THERAPY: CPT

## 2022-12-27 PROCEDURE — 97129 THER IVNTJ 1ST 15 MIN: CPT

## 2022-12-27 PROCEDURE — 97530 THERAPEUTIC ACTIVITIES: CPT

## 2022-12-27 PROCEDURE — 99232 SBSQ HOSP IP/OBS MODERATE 35: CPT | Performed by: INTERNAL MEDICINE

## 2022-12-27 PROCEDURE — 97110 THERAPEUTIC EXERCISES: CPT

## 2022-12-27 PROCEDURE — 1180000000 HC REHAB R&B

## 2022-12-27 PROCEDURE — 99232 SBSQ HOSP IP/OBS MODERATE 35: CPT | Performed by: PHYSICAL MEDICINE & REHABILITATION

## 2022-12-27 PROCEDURE — 6370000000 HC RX 637 (ALT 250 FOR IP): Performed by: PSYCHIATRY & NEUROLOGY

## 2022-12-27 PROCEDURE — 6370000000 HC RX 637 (ALT 250 FOR IP): Performed by: STUDENT IN AN ORGANIZED HEALTH CARE EDUCATION/TRAINING PROGRAM

## 2022-12-27 PROCEDURE — 85610 PROTHROMBIN TIME: CPT

## 2022-12-27 PROCEDURE — 92507 TX SP LANG VOICE COMM INDIV: CPT

## 2022-12-27 RX ORDER — WARFARIN SODIUM 5 MG/1
5 TABLET ORAL
Status: COMPLETED | OUTPATIENT
Start: 2022-12-27 | End: 2022-12-27

## 2022-12-27 RX ADMIN — RASAGILINE 1 MG: 1 TABLET ORAL at 08:02

## 2022-12-27 RX ADMIN — ESCITALOPRAM OXALATE 20 MG: 10 TABLET ORAL at 08:02

## 2022-12-27 RX ADMIN — CARBIDOPA AND LEVODOPA 1 TABLET: 25; 100 TABLET, EXTENDED RELEASE ORAL at 08:02

## 2022-12-27 RX ADMIN — POLYETHYLENE GLYCOL 3350 17 G: 17 POWDER, FOR SOLUTION ORAL at 08:03

## 2022-12-27 RX ADMIN — WARFARIN SODIUM 5 MG: 5 TABLET ORAL at 18:23

## 2022-12-27 RX ADMIN — CARBIDOPA AND LEVODOPA 1 TABLET: 25; 100 TABLET ORAL at 08:02

## 2022-12-27 RX ADMIN — SENNOSIDES 17.2 MG: 8.6 TABLET, FILM COATED ORAL at 20:34

## 2022-12-27 RX ADMIN — CARBIDOPA AND LEVODOPA 1 TABLET: 25; 100 TABLET, EXTENDED RELEASE ORAL at 20:35

## 2022-12-27 RX ADMIN — CARBIDOPA AND LEVODOPA 1 TABLET: 25; 100 TABLET ORAL at 20:35

## 2022-12-27 RX ADMIN — QUETIAPINE FUMARATE 25 MG: 25 TABLET ORAL at 20:34

## 2022-12-27 ASSESSMENT — ENCOUNTER SYMPTOMS
RESPIRATORY NEGATIVE: 1
ALLERGIC/IMMUNOLOGIC NEGATIVE: 1
EYES NEGATIVE: 1
GASTROINTESTINAL NEGATIVE: 1

## 2022-12-27 NOTE — PROGRESS NOTES
[facility] Internal Medicine  Jian Dixon MD; Marjan Moreira MD; Rin Cardoza MD; MD Bravo Ontiveros MD; MD LOLY [facility] Internal Medicine   [facility]    Progress note            Date:   12/27/2022  Patient name:  Fabian Sharma  Date of admission:  12/15/2022  5:57 PM  MRN:   [id number]  Account:  [de-identified]  YOB: 1955  PCP:    Norm Grubbs MD  Room:   [address] [place] [address]  Code Status:    Full Code    Chief Complaint:       Back pain from spinal stenosis s/p laminectomy    History Obtained From:     Patient and electronic medical record    History of Present Illness:     Fabian Sharma is a 79 y.o. Unavailable / unknown male who has past medical history of a . Fib and parkinson presents with back pain from spinal cord compression s/p laminectomy. He is admitted for acute rehabilitation. Laminectomy done at [facility] 1 week back. Currently patient denied back pain. He dose have weakness in bilateral lower extremity 4/5.      Past Medical History:     Past Medical History:   Diagnosis Date    Anxiety     Asthma     Atrial fibrillation (HCC)     Back pain     BPH (benign prostatic hyperplasia)     Dental disease     Depression     Diarrhea     MALONE (dyspnea on exertion)     Frequent falls     GERD (gastroesophageal reflux disease)     Memory loss     Obesity     Panic disorder     Parkinson's disease ([address]     Testicular cancer ([address]     Visual impairment         Past Surgical History:     Past Surgical History:   Procedure Laterality Date    BRAIN SURGERY      Neurostimulator implant for Parkinson's    BROW LIFT      CARPAL TUNNEL RELEASE Right     CHOLECYSTECTOMY      COLONOSCOPY      CYSTOSCOPY      HERNIA REPAIR      LUMBAR LAMINECTOMY      LYMPH NODE DISSECTION      ORCHIECTOMY      SHOULDER ARTHROSCOPY Right     TOTAL KNEE ARTHROPLASTY Bilateral     TURP      WRIST SURGERY Left     cyst removal Medications Prior to Admission:     Prior to Admission medications    Not on File        Allergies:     Patient has no known allergies. Social History:     Tobacco:    reports that he has never smoked. He has never used smokeless tobacco.  Alcohol:      has no history on file for alcohol use. Drug Use:  has no history on file for drug use. Family History:     Family History   Problem Relation Age of Onset    Other Mother     Heart Failure Father     Other Sister     Behavior Problems Sister     No Known Problems Brother        Review of Systems:     Review of Systems   Constitutional: Negative. HENT: Negative. Eyes: Negative. Respiratory: Negative. Cardiovascular: Negative. Gastrointestinal: Negative. Endocrine: Negative. Genitourinary: Negative. Musculoskeletal: Negative. Bilateral lower extremity weakness. Skin: Negative. Allergic/Immunologic: Negative. Neurological: Negative. Hematological: Negative. Psychiatric/Behavioral: Negative. Ale Gray Physical Exam:     Physical Exam   Vitals:    Vitals:    22 1815 22 0600 22 1800 22 0616   BP: 138/84 113/76 122/65 (!) 143/96   Pulse: 72 78 77 (!) 112   Resp: 16 12 14 16   Temp: 98 °F (36.7 °C) 97.3 °F (36.3 °C) 98.1 °F (36.7 °C) 97.3 °F (36.3 °C)   TempSrc: Oral Oral     SpO2:  93%  93%   Weight:       Height:                        Body mass index is 32.75 kg/m². Temp (24hrs), Av.7 °F (36.5 °C), Min:97.3 °F (36.3 °C), Max:98.1 °F (36.7 °C)    No results for input(s): POCGLU in the last 72 hours. General Appearance:   well apearing             Skin:                             No rash or erythema  HEENT ;                                                                       No icterus, no pallor . No ptosis.     No gross asymmetry  Or abnormality face         Neck:                            No mass , no thyroid enlargement Pulmonary/Chest:                                               Symmetric                                          Clear to auscultation bilaterally . No wheezes,                                          No rales or rhonchi . No abnormality on percussion                                                        Cardiovascular:            Normal rate, regular rhythm,                                          No murmur or  Gallop . Abdomen:                       Soft, non-tender                                           Normal bowels sounds,                                             Extremities:                    normal sensation.  Power 4/5 in bilateral lower extremities                                           Musculo-skeletal / Neurological ;;                                                                                               Investigations:      URINE ANALYSIS: No results found for: LABURIN     CBC:  Lab Results   Component Value Date/Time    WBC 5.2 12/23/2022 06:09 AM    HGB 12.0 12/23/2022 06:09 AM     12/23/2022 06:09 AM             BMP:    Lab Results   Component Value Date/Time     12/23/2022 06:09 AM    K 4.3 12/23/2022 06:09 AM     12/23/2022 06:09 AM    CO2 25 12/23/2022 06:09 AM    BUN 22 12/23/2022 06:09 AM    CREATININE 0.76 12/23/2022 06:09 AM    GLUCOSE 93 12/23/2022 06:09 AM      LIVER PROFILE:  Lab Results   Component Value Date/Time    ALT 44 12/16/2022 06:21 AM    AST 35 12/16/2022 06:21 AM    PROT 7.0 12/16/2022 06:21 AM    BILITOT 0.7 12/16/2022 06:21 AM    BILIDIR 0.2 12/16/2022 06:21 AM    LABALBU 4.0 12/16/2022 06:21 AM             @BRIEFLABT(TSH)@      Laboratory Testing:  Recent Results (from the past 24 hour(s))   Protime-INR    Collection Time: 12/27/22  6:04 AM   Result Value Ref Range    Protime 31.1 (H) 11.8 - 14.6 sec    INR 3.0          Imaging/Diagnostics:  No results found. Assessment :      Hospital Problems             Last Modified POA    * (Principal) Cord compression ([address] 12/15/2022 Yes    Moderate malnutrition ([address] 12/16/2022 Yes    Spinal stenosis of lumbar region 12/24/2022 Yes   Arelis Chua is a 79 y.o. Unavailable / unknown male who has past medical history of a . Fib and parkinson presents with back pain from spinal cord compression s/p laminectomy. He is admitted for acute rehabilitation. Laminectomy done at [facility] 1 week back. 12/27  Labs, radiology, medications reviewed,  Heart rate was uncontrolled this morning, blood pressure was up, patient had little discomfort while doing exercises,  Settled down,  Patient advancing well with physical therapy  Atrial fibrillation, had RVR this morning, continued AV blockers, controlled at this time, on Coumadin,last  INR is more than 2  History of Parkinson disease on Sinemet  Family at the bedside, updated on current condition,        Electronically signed by Mikie Lynne MD on 12/27/2022 at 2:56 PM      Copy sent to Dr. Kimberly Madrid MD    Please note that this chart was generated using voice recognition Dragon dictation software. Although every effort was made to ensure the accuracy of this automated transcription, some errors in transcription may have occurred.

## 2022-12-27 NOTE — PLAN OF CARE
Problem: Discharge Planning  Goal: Discharge to home or other facility with appropriate resources  12/27/2022 1512 by Ismael Troy RN  Outcome: Progressing     Problem: Skin/Tissue Integrity  Goal: Absence of new skin breakdown  Description: 1. Monitor for areas of redness and/or skin breakdown  2. Assess vascular access sites hourly  3. Every 4-6 hours minimum:  Change oxygen saturation probe site  4. Every 4-6 hours:  If on nasal continuous positive airway pressure, respiratory therapy assess nares and determine need for appliance change or resting period.   12/27/2022 1512 by Ismael Troy RN  Outcome: Progressing     Problem: Safety - Adult  Goal: Free from fall injury  12/27/2022 1512 by Ismale Troy RN  Outcome: Progressing     Problem: Pain  Goal: Verbalizes/displays adequate comfort level or baseline comfort level  12/27/2022 1512 by Ismael Troy RN  Outcome: Progressing     Problem: ABCDS Injury Assessment  Goal: Absence of physical injury  12/27/2022 1512 by Ismael Troy RN  Outcome: Progressing  Flowsheets (Taken 12/27/2022 0804)  Absence of Physical Injury: Implement safety measures based on patient assessment     Problem: Nutrition Deficit:  Goal: Optimize nutritional status  12/27/2022 1512 by Ismael Troy RN  Outcome: Progressing

## 2022-12-27 NOTE — PLAN OF CARE
Problem: Discharge Planning  Goal: Discharge to home or other facility with appropriate resources  Outcome: Progressing     Problem: Skin/Tissue Integrity  Goal: Absence of new skin breakdown  Description: 1. Monitor for areas of redness and/or skin breakdown  2. Assess vascular access sites hourly  3. Every 4-6 hours minimum:  Change oxygen saturation probe site  4. Every 4-6 hours:  If on nasal continuous positive airway pressure, respiratory therapy assess nares and determine need for appliance change or resting period. Outcome: Progressing     Problem: Safety - Adult  Goal: Free from fall injury  Outcome: Progressing     Problem: Pain  Goal: Verbalizes/displays adequate comfort level or baseline comfort level  Outcome: Progressing     Problem: Confusion  Goal: Confusion, delirium, dementia, or psychosis is improved or at baseline  Description: INTERVENTIONS:  1. Assess for possible contributors to thought disturbance, including medications, impaired vision or hearing, underlying metabolic abnormalities, dehydration, psychiatric diagnoses, and notify attending LIP  2. [place] high risk fall precautions, as indicated  3. Provide frequent short contacts to provide reality reorientation, refocusing and direction  4. Decrease environmental stimuli, including noise as appropriate  5. Monitor and intervene to maintain adequate nutrition, hydration, elimination, sleep and activity  6. If unable to ensure safety without constant attention obtain sitter and review sitter guidelines with assigned personnel  7.  Initiate Psychosocial CNS and Spiritual Care consult, as indicated  Outcome: Progressing     Problem: ABCDS Injury Assessment  Goal: Absence of physical injury  Outcome: Progressing  Flowsheets (Taken 12/26/2022 1194 by Leonor Mckinley RN)  Absence of Physical Injury: Implement safety measures based on patient assessment     Problem: Nutrition Deficit:  Goal: Optimize nutritional status  Outcome: Progressing

## 2022-12-27 NOTE — PROGRESS NOTES
Speech Language Pathology  Speech Language Pathology  [facility] at [facility]    Speech Language/Cognitive Treatment Note    Date: 12/27/2022  Patients Name: Siomara Gonsalez  MRN: [id number]  Diagnosis:   Patient Active Problem List   Diagnosis Code    Atrial fibrillation [facility]) I48.91    Chronic back pain M54.9, G89.29    Erectile dysfunction N52.9    Gait disorder R26.9    Hypertension I10    Lower urinary tract symptoms R39.9    Malignant neoplasm of testis (HCC) C62.90    Osteoarthrosis M19.90    Parkinson disease ([address] G20    Peripheral nerve disease G62.9    Cord compression ([address] G95.20    Moderate malnutrition ([address] E44.0    Spinal stenosis of lumbar region M48.061       Pain: no c/o pain    Speech and Language Treatment  Treatment time: 3753-6613  *session shortened due to maintenance issue in room. Subjective: [x] Alert [x] Cooperative     [] Confused     [] Agitated    [] Lethargic      Objective/Assessment:    Speech: Pt demonstrated mild dysarthria characterized by rapid rate of speech, imprecise articulation and reduced intensity. Intelligibility rated at 85%. Pt educated re: continued use of compensatory dysarthria strategies (slow rate, over articulation, increased loudness). Intelligibility improved when cued for repetition. Pt utilized strategies appropriately in 30% of responses in sentence completion task (I), improved to 85% with mod verbal cues. Cognition: Recalled 2/4 target items from morning therapy sessions with MI (extra time), improved to 3/4 with max cues. Pt demo increased word finding difficulty when attempting to describe therapy activities. Oriented to 2/4 temporal and 2/2 spatial concepts (I), mod A for use of visual supports to improve recall. .     Other: Spouse present during session. Plan:  [x] Continue ST services    [] Discharge from ST:      Discharge recommendations: []  Further therapy recommended at discharge. The patient should be able to tolerate at least 3 hours of therapy per day over 5 days or 15 hours over 7 days. [] Further therapy recommended at discharge. [] No therapy recommended at discharge.       Treatment completed by: Nestor Gama M.S., CCC-SLP
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
## 2022-12-27 NOTE — PROGRESS NOTES
Physical Therapy  Facility/Department: [facility]  Rehabilitation Physical Therapy     NAME: Kelli Patient  : 1955 (79 y.o.)  MRN: [id number]  CODE STATUS: Full Code    Date of Service: 22      Past Medical History:   Diagnosis Date    Anxiety     Asthma     Atrial fibrillation (HCC)     Back pain     BPH (benign prostatic hyperplasia)     Dental disease     Depression     Diarrhea     MALONE (dyspnea on exertion)     Frequent falls     GERD (gastroesophageal reflux disease)     Memory loss     Obesity     Panic disorder     Parkinson's disease ([address]     Testicular cancer ([facility]     Visual impairment      Past Surgical History:   Procedure Laterality Date    BRAIN SURGERY      Neurostimulator implant for Parkinson's    BROW LIFT      CARPAL TUNNEL RELEASE Right     CHOLECYSTECTOMY      COLONOSCOPY      CYSTOSCOPY      HERNIA REPAIR      LUMBAR LAMINECTOMY      LYMPH NODE DISSECTION      ORCHIECTOMY      SHOULDER ARTHROSCOPY Right     TOTAL KNEE ARTHROPLASTY Bilateral     TURP      WRIST SURGERY Left     cyst removal       Chart Reviewed: Yes  Patient assessed for rehabilitation services?: Yes  Additional Pertinent Hx: Elizabeth Floyd is a 79 y.o. male With medical history of Atrial Fib and parkinsons disease who presented to undergo spinal surgery to remediate L1-2 stenosis and degenerative spondylolisthesis at L3-4 and presents with post-op back pain from spinal cord compression s/p laminectomy of L1-4 and PLIF L3-4 performed by Dr. Fortunato Shi at [facility] on 22 . The patient admits to  [facility] / Caregiver Present: Yes (spouse present in pm only)  Referring Practitioner: Dr. Joni Winkler  Referral Date : 12/15/22  Diagnosis: Spinal Cord Compression    Restrictions:  Restrictions/Precautions: Surgical Protocols; Fall Risk;General Precautions; Up as Tolerated  Required Braces or Orthoses  Spinal: Lumbar Corset  Position Activity Restriction  Spinal Precautions: Avoid Excessive Bending, Lifting, and Twisting of spine  Other position/activity restrictions: LSO to be donned when OOB; [id number] per Dr. Eleno Mack to shower POD #7 (Sx date: 12/7/22). Order clarified on 12/20/22- Per Dr. Dorethea Fothergill use back brace for comfort. It is okay to remove to shower. SUBJECTIVE  Subjective: pt is agreeable to therapy; spouse present in pm and reports patient is better than before he came prior to surgery but is \"back to typical Kimani\"            OBJECTIVE                    Functional Mobility  Bed mobility  Bridging: Stand by assistance  Rolling to Left: Stand by assistance  Rolling to Right: Stand by assistance  Supine to Sit: Stand by assistance  Sit to Supine: Stand by assistance  Scooting: Minimal assistance  Bed Mobility Comments: from flat mat surface with 3 pillows  Transfers  Sit to Stand: Stand by assistance  Stand to Sit: Stand by assistance  Bed to Chair: Stand by assistance  Comment: 17\" chair without arms Rajni with support of walker    Environmental Mobility  Ambulation  Surface: Level tile; Ramp  Device: Rolling Walker  Assistance: Stand by assistance  Quality of Gait: heavy flexed at knees; freezing with turns  Gait Deviations: Decreased step height;Decreased step length;Decreased head and trunk rotation; Slow Katey; Shuffles (on RLE)  Distance: 137 feeet x 4;50 feet around gym space with 2-3 turns. Comments: worked on transfer and ambulation at bedside for technique safety from varied surfaces and surfaces height.   More Ambulation?: Yes  Ambulation 2  Surface - 2: level tile  Device 2: No device  Assistance 2: Contact guard assistance  Quality of Gait 2: forward trunk, very NBOS, unsteady, intermittent scissor gait (especially during turning), Flatfoot initial contact bilaterally, and difficulty with turns, freezing at times with floor change and directional change  Distance: 120 feet- increased SOB; improved gait deviations without device but patient with increased safety concern with freezing for balance control. Comments: good arm swing with increase lesley with increased step length  Stairs  # Steps : 15  Stairs Height: 8\"  Rails: Right ascending  Curbs: 8\"  Device: Rolling walker  Assistance: Contact guard assistance;Stand by assistance  Comment: patient has 2 -8\" steps with door on Right side for \"support\" per spouse. spouse manages walker only and patient demonstrates steps unassisted at home. PT Exercises  Exercise Treatment: Pt performs multiple STS and stand pivot transfers emphasis on sequencing with transfer to facilitate improved saftey and technique. Pt initially having difficulty following step by step commandss, improving with repitition. Sit to stands in pm preformed on soft couch surface of lower heights 15-16\" floor to seat with compression in [facility].  A/AROM Exercises: Seated B LE with #2  and emphasis based movements x10-15  Functional Mobility Circuit Training: STS and SPT from various surfaces x15  Postural Correction Exercises: in //bars: fWD and retro ambulation and alt marching with ABD board and mirror feed back for carry over in widened CLARA during ambulation. Heel raises on blue fowm x10. Emphasis based side stepping w B UE support and LGTB around B LE. Motor Control/Coordination: // bar large stepping at step over objects  Exercise Equipment: NuStep 3 laps average 72 SPM,  L4    ASSESSMENT       Activity Tolerance  Activity Tolerance: Patient tolerated treatment well            GOALS  Patient Goals   Patient Goals : patient \"I havn't really thought that far\".  Spouse \"Strengthen Legs and improve safety\"  Short Term Goals  Time Frame for Short Term Goals: 7 days  Short Term Goal 1: pt to demo all Bed mobility with Rajni on flattened bed  Short Term Goal 2: pt to perform transfers with RW and Rajni  Short Term Goal 3: pt to ambulate 150' with RW and CGAx1  Short Term Goal 4: pt to negotiate single platform step + 2 successive steps (No rails) with Rajni x1 to allow for safe home access  Short Term Goal 5: pt to verbally identify 3/3 spinal precautions to maximize safety and functional outcomes. Long Term Goals  Time Frame for Long Term Goals : Until D/C  Long Term Goal 1: pt to demo all Bed mobility on flattened bed with supervision  Long Term Goal 2: pt to perform transfers with RW and supervision  Long Term Goal 3: pt to ambulate 150' with RW and SBAx1  Long Term Goal 4: pt to negotiate single platform step + 2 successive steps (No rails) with SBA to allow for safe home access  Long Term Goal 5: pt to improve BLE strength by 1/2 MMG to improve safety with mobility  Additional Goals?: Yes  Long term goal 6: pt to improve Sitting and standing balance to FAIR or better to decrease risk for falls during mobility  Long term goal 7: pt to demo simulated or actual car transfer with SBA  Long term goal 8: pt to negotiate FF of steps with Bilateral Rail and SBA to allow for safe access to basement level of home. PLAN OF CARE  Frequency: 1-2 treatment sessions per day, 5-7 days per week  Safety Devices  Type of Devices: Call light within reach; Chair alarm in place; Left in chair;Telesitter in use (wife present in room)    EDUCATION  Education  Education Given To: Patient; Family  Education Provided: Mobility Training; Safety  Education Provided Comments: discussed using VC to break freezing of gait. Discussed how this improves motor planning.  Pt does better when he self cues  Education Method: Demonstration;Verbal  Barriers to Learning: Cognition  Education Outcome: Continued education needed;Demonstrated understanding         Therapy Time   12/27/22 0912 12/27/22 1307   PT Individual Minutes   Time In 0920 1300   Time Out 2088 [id number]   Minutes 42 34   PT Concurrent Minutes   Time In 0900  --    Time Out 0920  --    Minutes 20  --              Evans Gooden PTA, 12/27/22 at 3:09 PM

## 2022-12-27 NOTE — PROGRESS NOTES
[address]    Acute Rehabilitation Occupational Therapy Daily Treatment Note    Date: 22  Patient Name: Denae Sanchez       Room: 9715/1779-76  MRN: [id number]  Account: [de-identified]   : 1955  (78 y.o.) Gender: male       Referring Practitioner: Rufino Farris MD  Diagnosis: Cord compression s/p surgical decompression/fusion L1-4  Additional Pertinent Hx: Patient with spinal cord compression who had L1-4 laminectomy and fusion performed by Dr. Grisel Guadalupe formarked L1-2 stenosis and degenerative spondylolisthesis at L3-4 on 22. Medical records indicate some post-op confusion. Pt admitted to ARU on 12/15/22. Treatment Diagnosis: Impaired self care status    Past Medical History:  has a past medical history of Anxiety, Asthma, Atrial fibrillation ([address] Back pain, BPH (benign prostatic hyperplasia), Dental disease, Depression, Diarrhea, MALONE (dyspnea on exertion), Frequent falls, GERD (gastroesophageal reflux disease), Memory loss, Obesity, Panic disorder, Parkinson's disease ([address] Testicular cancer ([address] and Visual impairment. Past Surgical History:   has a past surgical history that includes Cholecystectomy; hernia repair; Total knee arthroplasty (Bilateral); lumbar laminectomy; Orchiectomy; brow lift; Carpal tunnel release (Right); Shoulder arthroscopy (Right); Wrist surgery (Left); Colonoscopy; Cystoscopy; brain surgery; TURP; and lymph node dissection. Restrictions  Restrictions/Precautions  Restrictions/Precautions: Surgical Protocols, Fall Risk, General Precautions, Up as Tolerated  Required Braces or Orthoses?: Yes  Implants present? : Metal implants, Deep brain stimulator  Required Braces or Orthoses  Spinal: Lumbar Corset  Position Activity Restriction  Spinal Precautions: Avoid Excessive Bending, Lifting, and Twisting of spine  Other position/activity restrictions: LSO to be donned when OOB; Gigi Mayers per Dr. Juliet Rivera to shower POD #7 (Sx date: 22).  Order clarified on 12/20/22- Per Dr. Blanco Rivera use back brace for comfort. It is okay to remove to shower. Subjective  Subjective  Subjective: \"I think I will take a shower\" Pt demos with slight impulsiveness this AM leaving RW outside bathroom, forgetting he is trying to use restroom. Pain Assessment  Pain Assessment: None - Denies Pain    Objective  Cognition  Overall Orientation Status: Within Functional Limits       Cognition  Overall Cognitive Status: Exceptions  Arousal/Alertness: Delayed responses to stimuli  Following Commands: Follows one step commands with increased time; Follows one step commands with repetition  Attention Span: Difficulty attending to directions  Memory: Decreased short term memory;Decreased recall of precautions;Decreased recall of recent events  Safety Judgement: Decreased awareness of need for assistance;Decreased awareness of need for safety  Problem Solving: Assistance required to identify errors made;Assistance required to correct errors made;Assistance required to implement solutions;Assistance required to generate solutions;Decreased awareness of errors  Insights: Decreased awareness of deficits  Initiation: Requires cues for some  Sequencing: Requires cues for some  Cognition Comment: Pt's Wife Juany Escobar states that pt's cognition and memory is near baseline; Hx of parkinson's disease Dx'd 10-15 years ago    Activities of Daily Living  Upper Extremity Bathing  Assistance Level: Supervision  Skilled Clinical Factors: seated on tub bench    Lower Extremity Bathing  Assistance Level: Stand by assist  Skilled Clinical Factors: SUP for washing BLEs while seated on tub bench, SBA standing at GB to wash buttocks. VC for sit for rinsing off for decreased fall risk. Upper Extremity Dressing  Assistance Level: Stand by assist;Increased time to complete  Skilled Clinical Factors: CGA pulling down in back 2* dampness.     Lower Extremity Dressing  Assistance Level: Contact guard assist  Skilled Clinical Factors: Pt able to doff/ashley underwear and pants with increased time, CGA for orienting and CGA forthreading BLEs. Putting On/Taking Off Footwear  Assistance Level: Minimal assistance  Skilled Clinical Factors: Pt able to doff B socks. Requires A with donning B socks due to dampness after shower. PM: Writer provides pt with elastic shoe laces and demos use of foot funnel for ease with donning shoes. Pt able to ashley with setup and min A for stabilization throughout. Toileting  Assistance Level: Stand by assist  Skilled Clinical Factors: SBA during clothing management before and after transferring to toilet. Pt is able to complete toilet hygiene while seated by weight shifting (I). Toilet Transfers  Technique:  (ambulating with no device (uses RW mnost of the time))  Equipment: Standard toilet;Grab bars  Additional Factors: Verbal cues  Assistance Level: Stand by assist  Skilled Clinical Factors: Close SBA for safety. Tub/Shower Transfers  Type: Shower  Transfer From:  (ambulaitng with GB into shower with no device)  Transfer To: Tub transfer bench  Assistance Level: Stand by assist  Skilled Clinical Factors: VC for safety, good use of grab bars over threshold and truning to sit, no gross LOB    Mobility  Sit to Stand  Assistance Level: Contact guard assist  Skilled Clinical Factors: CGA/SBA- VCs for hand placement and postural alignment. max VCs for WC safety. locking brakes prior to transfer. P return  Stand to Sit  Assistance Level: Contact guard assist  Skilled Clinical Factors: CGA/SBA- cuing for hand placement and controlled descend. Functional Mobility  Device: Rolling walker  Activity: To/From bathroom; To/From therapy gym  Assistance Level: Contact guard assist  Skilled Clinical Factors: CGA during mobility overall- occasional VC provided to correct flexed kneed and pacing. pt impulsive. no LOB noted.  Pt completes functional mobility in small bathroom with no device using sink and grab bars for UE support as needed. Intermittent CGA for safety with turning. no LOB noted. OT Exercises  Exercise Treatment: Instruction and participation in B UE str exercises with 2# free wt, 15-20 reps in all planes, rest breaks taken as needed, to continue to increase general str and activity tolerance for maximized indep and safety during all daily self care and leisure activities. Tactile cues required for correct form. Assessment  Assessment  Activity Tolerance: Patient tolerated treatment well  Discharge Recommendations: 24 hour supervision or assist;Home with Home health OT    Patient Education  Education  Education Given To: Patient; Family  Education Provided: Role of Therapy;Plan of Care;ADL Function; Fall Prevention Strategies; Equipment; Mobility Training;Transfer Training;Energy Conservation; Safety  Education Method: Demonstration;Verbal  Barriers to Learning: Cognition  Education Outcome: Verbalized understanding;Demonstrated understanding;Continued education needed      Safety Devices  Safety Devices in place: Yes  Type of devices: Left in chair;Chair alarm in place;Call light within reach (Spouse in room at end of session)       Goals  Patient Goals   Patient goals : \"I would like to be able to get up and go so I can go outside and do some repairs and go downstairs and reload some guerrero. Do the things I normally do. \"  Short Term Goals  Time Frame for Short Term Goals: By 1 week  Short Term Goal 1: Pt will complete upper body dressing/bathing with Min A and Good attention/safety while maintaining back precautions  Short Term Goal 2: Pt will complete lower body dressing/bathing with Mod A and Good safety with use of AE as needed while maintaining back precautions  Short Term Goal 3: Pt will complete funcitonal transfers/mobility during self care tasks with Min A and Good safety with use of least restrictive device  Short Term Goal 4: Pt will tolerate standing 4+ minutes during self care tasks with Min A and Good safety  Short Term Goal 5: Pt will verbalize/demonstrate understanding of back precautions during daily activities 80% accuracy  Short Term Goal 6: Pt will participate in 30+ minutes during therapeutic exercises/functional activities to increase safety and independence with self care and mobility    Long Term Goals  Time Frame for Long Term Goals : By discharge  Long Term Goal 1: Pt will complete BADLs with SBA and Good attention/safety to task while maintaining back precautions  Long Term Goal 2: Pt will complete functional transfers/mobility during self care taks with SBA and Good safety with use of least restrictive device  Long Term Goal 3: Pt will tolerate standing 8+ minutes during self care tasks with SBA and Good safety  Long Term Goal 4: Pt will demonstrate increased [address] and strength during self care tasks as evident by 5# improvement in  strength and 15 second improvement on 9 hole peg test.  Long Term Goal 5: Pt will demonstrate sitting EOB 20+ minutes during self care tasks while maintaining appropriate midline with SBA  Long Term Goal 6: Pt/family will verbalize/demonstrate of home safety/fall prevention strategies to increase safety and independence with self care and mobility    Plan  Occupational Therapy Plan  Times Per Week: 5-7  Times Per Day: Twice a day  Current Treatment Recommendations: Self-Care / ADL, Strengthening, ROM, Balance training, Functional mobility training, Endurance training, Cognitive reorientation, Pain management, Safety education & training, Patient/Caregiver education & training, Equipment evaluation, education, & procurement, Home management training, Coordination training, Cognitive/Perceptual training         12/27/22 0801 12/27/22 1538   OT Individual Minutes   Time In 0801 1334   Time Out 0900 1405   Minutes 59 31         Electronically signed by Governor KIM Corcoran on 12/27/22 at 3:44 PM EST

## 2022-12-27 NOTE — PROGRESS NOTES
Pharmacy Note  Warfarin Consult follow-up      Recent Labs     12/25/22  0619 12/26/22  0525 12/27/22  0604   INR 2.6 2.7 3.0     No results for input(s): HGB, HCT, PLT in the last 72 hours. Significant Drug-Drug Interactions:  New warfarin drug-drug interactions: none  Discontinued drug-drug interactions: none  Current warfarin drug-drug interactions: sinemet, lexapro, rasagiline      Date             INR        Dose given previous day  Dose scheduled for today  12/27/2022            3.0       5 mg           5 mg        Notes:                   INR at 3 today, will give 5 mg tonight, may need to decrease dose if INR trends above 3. Pharmacy will follow. Daily PT/INR while inpatient.      Vaughn Almeida RPH,PharmD,  12/27/2022, 11:10 AM

## 2022-12-28 LAB
INR BLD: 2.9
PROTHROMBIN TIME: 30.4 SEC (ref 11.8–14.6)

## 2022-12-28 PROCEDURE — 97530 THERAPEUTIC ACTIVITIES: CPT

## 2022-12-28 PROCEDURE — 97116 GAIT TRAINING THERAPY: CPT

## 2022-12-28 PROCEDURE — 97110 THERAPEUTIC EXERCISES: CPT

## 2022-12-28 PROCEDURE — 6370000000 HC RX 637 (ALT 250 FOR IP): Performed by: PHYSICAL MEDICINE & REHABILITATION

## 2022-12-28 PROCEDURE — 99231 SBSQ HOSP IP/OBS SF/LOW 25: CPT | Performed by: INTERNAL MEDICINE

## 2022-12-28 PROCEDURE — 97129 THER IVNTJ 1ST 15 MIN: CPT

## 2022-12-28 PROCEDURE — 92507 TX SP LANG VOICE COMM INDIV: CPT

## 2022-12-28 PROCEDURE — 99231 SBSQ HOSP IP/OBS SF/LOW 25: CPT | Performed by: PHYSICAL MEDICINE & REHABILITATION

## 2022-12-28 PROCEDURE — 6370000000 HC RX 637 (ALT 250 FOR IP): Performed by: STUDENT IN AN ORGANIZED HEALTH CARE EDUCATION/TRAINING PROGRAM

## 2022-12-28 PROCEDURE — 97535 SELF CARE MNGMENT TRAINING: CPT

## 2022-12-28 PROCEDURE — 1180000000 HC REHAB R&B

## 2022-12-28 PROCEDURE — 36415 COLL VENOUS BLD VENIPUNCTURE: CPT

## 2022-12-28 PROCEDURE — 6370000000 HC RX 637 (ALT 250 FOR IP): Performed by: PSYCHIATRY & NEUROLOGY

## 2022-12-28 PROCEDURE — 85610 PROTHROMBIN TIME: CPT

## 2022-12-28 RX ORDER — WARFARIN SODIUM 5 MG/1
5 TABLET ORAL
Status: COMPLETED | OUTPATIENT
Start: 2022-12-28 | End: 2022-12-28

## 2022-12-28 RX ADMIN — WARFARIN SODIUM 5 MG: 5 TABLET ORAL at 18:01

## 2022-12-28 RX ADMIN — CARBIDOPA AND LEVODOPA 1 TABLET: 25; 100 TABLET ORAL at 20:51

## 2022-12-28 RX ADMIN — POLYETHYLENE GLYCOL 3350 17 G: 17 POWDER, FOR SOLUTION ORAL at 07:29

## 2022-12-28 RX ADMIN — CARBIDOPA AND LEVODOPA 1 TABLET: 25; 100 TABLET, EXTENDED RELEASE ORAL at 07:29

## 2022-12-28 RX ADMIN — CARBIDOPA AND LEVODOPA 1 TABLET: 25; 100 TABLET ORAL at 07:29

## 2022-12-28 RX ADMIN — SENNOSIDES 17.2 MG: 8.6 TABLET, FILM COATED ORAL at 20:51

## 2022-12-28 RX ADMIN — RASAGILINE 1 MG: 1 TABLET ORAL at 07:28

## 2022-12-28 RX ADMIN — ESCITALOPRAM OXALATE 20 MG: 10 TABLET ORAL at 07:27

## 2022-12-28 RX ADMIN — QUETIAPINE FUMARATE 25 MG: 25 TABLET ORAL at 20:51

## 2022-12-28 NOTE — PROGRESS NOTES
Pharmacy Note  Warfarin Consult follow-up      Recent Labs     12/26/22  0525 12/27/22  0604 12/28/22  0726   INR 2.7 3.0 2.9     No results for input(s): HGB, HCT, PLT in the last 72 hours. Significant Drug-Drug Interactions:  New warfarin drug-drug interactions: none  Discontinued drug-drug interactions: none  Current warfarin drug-drug interactions: lexaprluan seroquel    Date             INR        Dose given previous day  Dose scheduled for today  12/28/2022            2.9       5 mg           5 mg    Notes:                   Repeat 5 mg dose today    Daily PT/INR while inpatient.      Darlyn JenkinsD, BCSCP  12/28/2022   8:11 AM

## 2022-12-28 NOTE — CARE COORDINATION
ONGOING ARU DISCHARGE PLAN:     Patient is alert and oriented x4. Spoke with patient regarding discharge plan and patient and wife both  confirm plan home and attend OP therapy     DME: Ji Mckenzie ordered through [facility] on 12/20/22     Outside appointments: No outside appointments prior to discharge    Will continue to follow for additional discharge needs.       Electronically signed by Avery Romero RN on 12/28/2022 at 12:23 PM
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
## 2022-12-28 NOTE — PROGRESS NOTES
[address]    Acute Rehabilitation Occupational Therapy Daily Treatment Note    Date: 22  Patient Name: Joavni Juarez       Room: 2228/2611-98  MRN: [id number]  Account: [de-identified]   : 1955  (78 y.o.) Gender: male       Referring Practitioner: Ricardo Corral MD  Diagnosis: Cord compression s/p surgical decompression/fusion L1-4  Additional Pertinent Hx: Patient with spinal cord compression who had L1-4 laminectomy and fusion performed by Dr. Ольга Khalil formarked L1-2 stenosis and degenerative spondylolisthesis at L3-4 on 22. Medical records indicate some post-op confusion. Pt admitted to ARU on 12/15/22. Treatment Diagnosis: Impaired self care status    Past Medical History:  has a past medical history of Anxiety, Asthma, Atrial fibrillation ([address] Back pain, BPH (benign prostatic hyperplasia), Dental disease, Depression, Diarrhea, MALONE (dyspnea on exertion), Frequent falls, GERD (gastroesophageal reflux disease), Memory loss, Obesity, Panic disorder, Parkinson's disease ([address] Testicular cancer ([address] and Visual impairment. Past Surgical History:   has a past surgical history that includes Cholecystectomy; hernia repair; Total knee arthroplasty (Bilateral); lumbar laminectomy; Orchiectomy; brow lift; Carpal tunnel release (Right); Shoulder arthroscopy (Right); Wrist surgery (Left); Colonoscopy; Cystoscopy; brain surgery; TURP; and lymph node dissection. Restrictions  Restrictions/Precautions  Restrictions/Precautions: Surgical Protocols, Fall Risk, General Precautions, Up as Tolerated  Required Braces or Orthoses?: Yes  Implants present? : Metal implants, Deep brain stimulator  Required Braces or Orthoses  Spinal: Lumbar Corset  Position Activity Restriction  Spinal Precautions: Avoid Excessive Bending, Lifting, and Twisting of spine  Other position/activity restrictions: LSO to be donned when OOB; Heddy Devoid per Dr. Viri Flores to shower POD #7 (Sx date: 22).  Order clarified on 12/20/22- Per Dr. Melly Jean use back brace for comfort. It is okay to remove to shower. Vitals      Subjective  Subjective  Subjective: AM: Pt. sitting up in recliner chair upon arrival. Alert. Pleasant. Cooperative. PM: Pt. sitting up in recliner chair. Wife present. Pt. requesting to use bathroom. Pain: No c/o any pain at this time. Objective  Cognition  Overall Orientation Status: Within Functional Limits  Orientation Level: Oriented X4    Activities of Daily Living  Feeding  Assistance Level: Modified independent  Skilled Clinical Factors: per spouse and pt report    Grooming/Oral Hygiene  Assistance Level: Stand by assist  Skilled Clinical Factors: While standing at sink. Upper Extremity Bathing  Assistance Level: Supervision  Skilled Clinical Factors: While seated on toilet. Lower Extremity Bathing  Assistance Level: Stand by assist  Skilled Clinical Factors: SUP for washing BLEs while seated, SBA standing at GB to wash bolivar area and buttocks. Upper Extremity Dressing  Assistance Level: Set-up  Skilled Clinical Factors: [place] and donning overhead tshirt without difficulty. Lower Extremity Dressing  Assistance Level: Contact guard assist  Skilled Clinical Factors: Pt able to doff/ashley underwear and pants with increased time, CGA for orienting and CGA forthreading BLEs. Putting On/Taking Off Footwear  Equipment Provided: Foot funnel  Assistance Level: Minimal assistance  Skilled Clinical Factors: Pt. able to doff B socks without AE. A provided with donning of TEDs. Pt. able to ashley shoes with elastic shoe laces- requiring Kyler to stabilize shoe with foot funnel. Toileting  Assistance Level: Stand by assist  Skilled Clinical Factors: Completed during AM and PM sessions. SBA during clothing management before and after transferring to toilet. Pt. is able to complete toilet hygiene while seated by weight shifting (I).     Toilet Transfers  Equipment: Standard toilet;Grab bars  Assistance Level: Stand by assist  Skilled Clinical Factors: Completed during AM and PM sessions. Close SBA for safety. Mobility     Sit to Stand  Assistance Level: Contact guard assist  Skilled Clinical Factors: CGA/SBA- VCs for hand placement and postural alignment. max VCs for WC safety. locking brakes prior to transfer. P return  Stand to Sit  Assistance Level: Contact guard assist  Skilled Clinical Factors: CGA/SBA- cuing for hand placement and controlled descend. Stand Pivot  Assistance Level: Contact guard assist  Skilled Clinical Factors: Max cues for hand placement and sequencing. Functional Mobility  Device: Rolling walker  Activity: To/From bathroom; To/From therapy gym  Assistance Level: Contact guard assist  Skilled Clinical Factors: CGA/SBA. Occasional verbal cuing for activity pacing. Intermittent CGA while turning. OT Exercises  Exercise Treatment: Instruction and participation in B UE str exercises with 2# free wt, 15-20 reps in all planes, rest breaks taken as needed, to continue to increase general str and activity tolerance for maximized indep and safety during all daily self care and leisure activities. Tactile cues required for correct form. Assessment  Assessment  Activity Tolerance: Patient tolerated treatment well  Discharge Recommendations: 24 hour supervision or assist;Home with Home health OT    Patient Education  Education  Education Given To: Patient; Family  Education Provided: Role of Therapy;Plan of Care;ADL Function; Fall Prevention Strategies; Equipment; Mobility Training;Transfer Training;Energy Conservation; Safety  Education Method: Demonstration;Verbal  Barriers to Learning: Cognition  Education Outcome: Verbalized understanding;Demonstrated understanding;Continued education needed    OT Equipment Recommendations  Other: Pt and pt spouse reports having walk in shower, shower bench, and grab bars in shower and around commode at private residence.  That RW will fit into bathroom. Safety Devices  Safety Devices in place: Yes  Type of devices: All fall risk precautions in place     Goals  Patient Goals   Patient goals : \"I would like to be able to get up and go so I can go outside and do some repairs and go downstairs and reload some guerrero. Do the things I normally do. \"  Short Term Goals  Time Frame for Short Term Goals: By 1 week  Short Term Goal 1: Pt will complete upper body dressing/bathing with Min A and Good attention/safety while maintaining back precautions  Short Term Goal 2: Pt will complete lower body dressing/bathing with Mod A and Good safety with use of AE as needed while maintaining back precautions  Short Term Goal 3: Pt will complete funcitonal transfers/mobility during self care tasks with Min A and Good safety with use of least restrictive device  Short Term Goal 4: Pt will tolerate standing 4+ minutes during self care tasks with Min A and Good safety  Short Term Goal 5: Pt will verbalize/demonstrate understanding of back precautions during daily activities 80% accuracy  Short Term Goal 6: Pt will participate in 30+ minutes during therapeutic exercises/functional activities to increase safety and independence with self care and mobility    Long Term Goals  Time Frame for Long Term Goals : By discharge  Long Term Goal 1: Pt will complete BADLs with SBA and Good attention/safety to task while maintaining back precautions  Long Term Goal 2: Pt will complete functional transfers/mobility during self care taks with SBA and Good safety with use of least restrictive device  Long Term Goal 3: Pt will tolerate standing 8+ minutes during self care tasks with SBA and Good safety  Long Term Goal 4: Pt will demonstrate increased [facility] and strength during self care tasks as evident by 5# improvement in  strength and 15 second improvement on 9 hole peg test.  Long Term Goal 5: Pt will demonstrate sitting EOB 20+ minutes during self care tasks while maintaining appropriate midline with SBA  Long Term Goal 6: Pt/family will verbalize/demonstrate of home safety/fall prevention strategies to increase safety and independence with self care and mobility    Plan  Occupational Therapy Plan  Times Per Week: 5-7  Times Per Day: Twice a day  Current Treatment Recommendations: Self-Care / ADL, Strengthening, ROM, Balance training, Functional mobility training, Endurance training, Cognitive reorientation, Pain management, Safety education & training, Patient/Caregiver education & training, Equipment evaluation, education, & procurement, Home management training, Coordination training, Cognitive/Perceptual training         12/28/22 0957 12/28/22 1556   OT Individual Minutes   Time In 0800 1407   Time Out 0900 [id number]   Minutes 60 30       Electronically signed by Earnestine Bumpers, COTA/L on 12/28/22 at 3:57 PM EST

## 2022-12-28 NOTE — PROGRESS NOTES
[facility]ARITH[facility] Internal Medicine  Josafat Mane MD; Shasha Go MD; Jett Rojas MD; MD Martha Valdez MD; MD LOLY Hinton [facility] Internal Medicine   [facility]    Progress note            Date:   12/28/2022  Patient name:  Alison Grover  Date of admission:  12/15/2022  5:57 PM  MRN:   [id number]  Account:  [de-identified]  YOB: 1955  PCP:    Alireza Echavarria MD  Room:   [facility]/3346-71  Code Status:    Full Code    Chief Complaint:       Back pain from spinal stenosis s/p laminectomy    History Obtained From:     Patient and electronic medical record    History of Present Illness:     Alison Grover is a 79 y.o. Unavailable / unknown male who has past medical history of a . Fib and parkinson presents with back pain from spinal cord compression s/p laminectomy. He is admitted for acute rehabilitation. Laminectomy done at [facility] 1 week back. Currently patient denied back pain. He dose have weakness in bilateral lower extremity 4/5.      Past Medical History:     Past Medical History:   Diagnosis Date    Anxiety     Asthma     Atrial fibrillation (HCC)     Back pain     BPH (benign prostatic hyperplasia)     Dental disease     Depression     Diarrhea     MALONE (dyspnea on exertion)     Frequent falls     GERD (gastroesophageal reflux disease)     Memory loss     Obesity     Panic disorder     Parkinson's disease ([address]     Testicular cancer ([address]     Visual impairment         Past Surgical History:     Past Surgical History:   Procedure Laterality Date    BRAIN SURGERY      Neurostimulator implant for Parkinson's    BROW LIFT      CARPAL TUNNEL RELEASE Right     CHOLECYSTECTOMY      COLONOSCOPY      CYSTOSCOPY      HERNIA REPAIR      LUMBAR LAMINECTOMY      LYMPH NODE DISSECTION      ORCHIECTOMY      SHOULDER ARTHROSCOPY Right     TOTAL KNEE ARTHROPLASTY Bilateral     TURP      WRIST SURGERY Left     cyst removal Medications Prior to Admission:     Prior to Admission medications    Not on File        Allergies:     Patient has no known allergies. Social History:     Tobacco:    reports that he has never smoked. He has never used smokeless tobacco.  Alcohol:      has no history on file for alcohol use. Drug Use:  has no history on file for drug use. Family History:     Family History   Problem Relation Age of Onset    Other Mother     Heart Failure Father     Other Sister     Behavior Problems Sister     No Known Problems Brother        Review of Systems:     Review of Systems   Constitutional: Negative. HENT: Negative. Eyes: Negative. Respiratory: Negative. Cardiovascular: Negative. Gastrointestinal: Negative. Endocrine: Negative. Genitourinary: Negative. Musculoskeletal: Negative. Bilateral lower extremity weakness. Skin: Negative. Allergic/Immunologic: Negative. Neurological: Negative. Hematological: Negative. Psychiatric/Behavioral: Negative. Lawernce Roughen Physical Exam:     Physical Exam   Vitals:    Vitals:    22 0616 22 1816 22 0315 22 0606   BP: (!) 143/96 (!) 119/90  124/81   Pulse: (!) 112 78  71   Resp: 16 18  18   Temp: 97.3 °F (36.3 °C) 98.8 °F (37.1 °C)  97.9 °F (36.6 °C)   TempSrc:    Oral   SpO2: 93% 96%  95%   Weight:   237 lb 14 oz (107.9 kg)    Height:                        Body mass index is 32.26 kg/m². Temp (24hrs), Av.4 °F (36.9 °C), Min:97.9 °F (36.6 °C), Max:98.8 °F (37.1 °C)    No results for input(s): POCGLU in the last 72 hours. General Appearance:   well apearing             Skin:                             No rash or erythema  HEENT ;                                                                       No icterus, no pallor . No ptosis.     No gross asymmetry  Or abnormality face         Neck:                            No mass , no thyroid enlargement Pulmonary/Chest:                                               Symmetric                                          Clear to auscultation bilaterally . No wheezes,                                          No rales or rhonchi . No abnormality on percussion                                                        Cardiovascular:            Normal rate, regular rhythm,                                          No murmur or  Gallop . Abdomen:                       Soft, non-tender                                           Normal bowels sounds,                                             Extremities:                    normal sensation.  Power 4/5 in bilateral lower extremities                                           Musculo-skeletal / Neurological ;;                                                                                               Investigations:      URINE ANALYSIS: No results found for: LABURIN     CBC:  Lab Results   Component Value Date/Time    WBC 5.2 12/23/2022 06:09 AM    HGB 12.0 12/23/2022 06:09 AM     12/23/2022 06:09 AM             BMP:    Lab Results   Component Value Date/Time     12/23/2022 06:09 AM    K 4.3 12/23/2022 06:09 AM     12/23/2022 06:09 AM    CO2 25 12/23/2022 06:09 AM    BUN 22 12/23/2022 06:09 AM    CREATININE 0.76 12/23/2022 06:09 AM    GLUCOSE 93 12/23/2022 06:09 AM      LIVER PROFILE:  Lab Results   Component Value Date/Time    ALT 44 12/16/2022 06:21 AM    AST 35 12/16/2022 06:21 AM    PROT 7.0 12/16/2022 06:21 AM    BILITOT 0.7 12/16/2022 06:21 AM    BILIDIR 0.2 12/16/2022 06:21 AM    LABALBU 4.0 12/16/2022 06:21 AM             @BRIEFLABT(TSH)@      Laboratory Testing:  Recent Results (from the past 24 hour(s))   Protime-INR    Collection Time: 12/28/22  7:26 AM   Result Value Ref Range    Protime 30.4 (H) 11.8 - 14.6 sec    INR 2.9          Imaging/Diagnostics:  No results found. Assessment :      Hospital Problems             Last Modified POA    * (Principal) Cord compression ([address] 12/15/2022 Yes    Moderate malnutrition ([address] 12/16/2022 Yes    Spinal stenosis of lumbar region 12/24/2022 Yes   Alysha Domínguez is a 79 y.o. Unavailable / unknown male who has past medical history of a . Fib and parkinson presents with back pain from spinal cord compression s/p laminectomy. He is admitted for acute rehabilitation. Laminectomy done at [facility] 1 week back. 12/28  Labs, radiology, medications reviewed,  Heart rate was uncontrolled this morning, blood pressure was up, patient had little discomfort while doing exercises,  Settled down,  Patient advancing well with physical therapy  Atrial fibrillation, had RVR this morning, continued AV blockers, controlled at this time, on Coumadin,last  INR is more than 2  History of Parkinson disease on Sinemet  Family at the bedside, updated on current condition,        Electronically signed by Nahid Steel MD on 12/28/2022 at 4:53 PM      Copy sent to Dr. Alex Jackman MD    Please note that this chart was generated using voice recognition Dragon dictation software. Although every effort was made to ensure the accuracy of this automated transcription, some errors in transcription may have occurred.

## 2022-12-28 NOTE — PROGRESS NOTES
Speech Language Pathology  Speech Language Pathology  [facility] Acute Rehab Unit at [facility]    Speech Language/Cognitive Treatment Note    Date: 12/28/2022  Patients Name: Arelis Chua  MRN: [id number]  Diagnosis:   Patient Active Problem List   Diagnosis Code    Atrial fibrillation [facility]) I48.91    Chronic back pain M54.9, G89.29    Erectile dysfunction N52.9    Gait disorder R26.9    Hypertension I10    Lower urinary tract symptoms R39.9    Malignant neoplasm of testis (HCC) C62.90    Osteoarthrosis M19.90    Parkinson disease ([address] G20    Peripheral nerve disease G62.9    Cord compression ([address] G95.20    Moderate malnutrition ([address] E44.0    Spinal stenosis of lumbar region M48.061       Pain: 0/10    Speech and Language Treatment  Treatment time: 3250-7457      Subjective: [x] Alert [x] Cooperative     [] Confused     [] Agitated    [] Lethargic      Objective/Assessment:    Speech: Pt demonstrated mild dysarthria characterized by rapid rate of speech, imprecise articulation and reduced intensity. Intelligibility rated at ~85%. Pt educated re: continued use of compensatory dysarthria strategies (slow rate, over articulation, increased loudness) and diaphragmatic breathing to increase breath support for speech. Intelligibility improved when cued for repetition. Pt utilized strategies appropriately in 50% of responses in alliterations and picture description tasks (I), improved to 85% with mod verbal cues. Cognition: Pt able to recall 2/3 dysarthria compensatory strategies (I), increasing to 3/3 cued. Image retention (pool, 15 min delay)- 80% accuracy (I), 100% cued. Orientation log administered, O-Log score- 23/30 (disoriented to date, time and pathology deficits). Other: Spouse present during session. Plan:  [x] Continue ST services    [] Discharge from ST:      Discharge recommendations: []  Further therapy recommended at discharge. The patient should be able to tolerate at least 3 hours of therapy per day over 5 days or 15 hours over 7 days. [] Further therapy recommended at discharge. [] No therapy recommended at discharge. Treatment completed by:  Arturo Costa M.A., CCC-SLP

## 2022-12-28 NOTE — PLAN OF CARE
Problem: Discharge Planning  Goal: Discharge to home or other facility with appropriate resources  12/28/2022 1447 by Zac Paz RN  Outcome: Progressing  Flowsheets (Taken 12/28/2022 2097)  Discharge to home or other facility with appropriate resources:   Identify barriers to discharge with patient and caregiver   Arrange for needed discharge resources and transportation as appropriate   Identify discharge learning needs (meds, wound care, etc)   Refer to discharge planning if patient needs post-hospital services based on physician order or complex needs related to functional status, cognitive ability or social support system     Problem: Skin/Tissue Integrity  Goal: Absence of new skin breakdown  Description: 1. Monitor for areas of redness and/or skin breakdown  2. Assess vascular access sites hourly  3. Every 4-6 hours minimum:  Change oxygen saturation probe site  4. Every 4-6 hours:  If on nasal continuous positive airway pressure, respiratory therapy assess nares and determine need for appliance change or resting period. 12/28/2022 1447 by Zac Paz RN  Outcome: Progressing     Problem: Safety - Adult  Goal: Free from fall injury  12/28/2022 1447 by Zac Paz RN  Outcome: Progressing     Problem: Confusion  Goal: Confusion, delirium, dementia, or psychosis is improved or at baseline  Description: INTERVENTIONS:  1. Assess for possible contributors to thought disturbance, including medications, impaired vision or hearing, underlying metabolic abnormalities, dehydration, psychiatric diagnoses, and notify attending LIP  2. [place] high risk fall precautions, as indicated  3. Provide frequent short contacts to provide reality reorientation, refocusing and direction  4. Decrease environmental stimuli, including noise as appropriate  5. Monitor and intervene to maintain adequate nutrition, hydration, elimination, sleep and activity  6.  If unable to ensure safety without constant attention obtain sitter and review sitter guidelines with assigned personnel  7.  Initiate Psychosocial CNS and Spiritual Care consult, as indicated  12/28/2022 1447 by Adrianne Gamboa RN  Outcome: Progressing     Problem: ABCDS Injury Assessment  Goal: Absence of physical injury  12/28/2022 1447 by Adrianne Gamboa RN  Outcome: Progressing     Problem: Nutrition Deficit:  Goal: Optimize nutritional status  12/28/2022 1447 by Adrianne Gamboa RN  Outcome: Progressing

## 2022-12-28 NOTE — PLAN OF CARE
Problem: Discharge Planning  Goal: Discharge to home or other facility with appropriate resources  12/28/2022 0335 by Vivien Delgadillo RN  Outcome: Progressing     Problem: Skin/Tissue Integrity  Goal: Absence of new skin breakdown  Description: 1.  Monitor for areas of redness and/or skin breakdown  2.  Assess vascular access sites hourly  3.  Every 4-6 hours minimum:  Change oxygen saturation probe site  4.  Every 4-6 hours:  If on nasal continuous positive airway pressure, respiratory therapy assess nares and determine need for appliance change or resting period.  12/28/2022 0335 by Vivien Delgadillo RN  Outcome: Progressing     Problem: Safety - Adult  Goal: Free from fall injury  12/28/2022 0335 by Vivien Delgadillo RN  Outcome: Progressing     Problem: Pain  Goal: Verbalizes/displays adequate comfort level or baseline comfort level  12/28/2022 0335 by Vivien Delgadillo RN  Outcome: Progressing     Problem: Confusion  Goal: Confusion, delirium, dementia, or psychosis is improved or at baseline  Description: INTERVENTIONS:  1. Assess for possible contributors to thought disturbance, including medications, impaired vision or hearing, underlying metabolic abnormalities, dehydration, psychiatric diagnoses, and notify attending LIP  2. [place] high risk fall precautions, as indicated  3. Provide frequent short contacts to provide reality reorientation, refocusing and direction  4. Decrease environmental stimuli, including noise as appropriate  5. Monitor and intervene to maintain adequate nutrition, hydration, elimination, sleep and activity  6. If unable to ensure safety without constant attention obtain sitter and review sitter guidelines with assigned personnel  7. Initiate Psychosocial CNS and Spiritual Care consult, as indicated  12/28/2022 0335 by Vivien Delgadillo RN  Outcome: Progressing     Problem: ABCDS Injury Assessment  Goal: Absence of physical injury  12/28/2022 0335 by Vivien Delgadillo RN  Outcome:  Progressing     Problem: Nutrition Deficit:  Goal: Optimize nutritional status  12/28/2022 0335 by Meg Garnde RN  Outcome: Progressing

## 2022-12-28 NOTE — PROGRESS NOTES
Physical Therapy  Facility/Department: [facility]  Rehabilitation Physical Therapy  NAME: Alejandro Lucero  : 1955 (79 y.o.)  MRN: [id number]  CODE STATUS: Full Code    Date of Service: 22      Past Medical History:   Diagnosis Date    Anxiety     Asthma     Atrial fibrillation (HCC)     Back pain     BPH (benign prostatic hyperplasia)     Dental disease     Depression     Diarrhea     MALONE (dyspnea on exertion)     Frequent falls     GERD (gastroesophageal reflux disease)     Memory loss     Obesity     Panic disorder     Parkinson's disease ([address]     Testicular cancer ([address]     Visual impairment      Past Surgical History:   Procedure Laterality Date    BRAIN SURGERY      Neurostimulator implant for Parkinson's    BROW LIFT      CARPAL TUNNEL RELEASE Right     CHOLECYSTECTOMY      COLONOSCOPY      CYSTOSCOPY      HERNIA REPAIR      LUMBAR LAMINECTOMY      LYMPH NODE DISSECTION      ORCHIECTOMY      SHOULDER ARTHROSCOPY Right     TOTAL KNEE ARTHROPLASTY Bilateral     TURP      WRIST SURGERY Left     cyst removal       Chart Reviewed: Yes  Patient assessed for rehabilitation services?: Yes  Additional Pertinent Hx: Jackie Mendoza is a 79 y.o. male With medical history of Atrial Fib and parkinsons disease who presented to undergo spinal surgery to remediate L1-2 stenosis and degenerative spondylolisthesis at L3-4 and presents with post-op back pain from spinal cord compression s/p laminectomy of L1-4 and PLIF L3-4 performed by Dr. Georgette Lam at [facility] on 22 . The patient admits to  [facility] / Caregiver Present: Yes (spouse present in pm only)  Referring Practitioner: Dr. Julián Joya  Referral Date : 12/15/22  Diagnosis: Spinal Cord Compression    Restrictions:  Restrictions/Precautions: Surgical Protocols; Fall Risk;General Precautions; Up as Tolerated  Required Braces or Orthoses  Spinal: Lumbar Corset  Position Activity Restriction  Other position/activity restrictions: LSO to be donned when OOB; Jyoti Quivers per Dr. Suzzette Bumpers to shower POD #7 (Sx date: 12/7/22). Order clarified on 12/20/22- Per Dr. Melly Jean use back brace for comfort. It is okay to remove to shower. SUBJECTIVE  Subjective: pt is agreeable to therapy; spouse present and participates in therapy with gait and transfers. spouse reports patient is better than he was before and is comfortable with providing supervision at home. OBJECTIVE                  Functional Mobility  Bed mobility  Bridging: Stand by assistance  Rolling to Left: Stand by assistance  Rolling to Right: Stand by assistance  Supine to Sit: Stand by assistance  Sit to Supine: Stand by assistance  Scooting: Minimal assistance  Bed Mobility Comments: from flat mat surface with 3 pillows  Transfers  Sit to Stand: Stand by assistance  Stand to Sit: Stand by assistance  Bed to Chair: Stand by assistance  Comment: 17\" chair without arms Rajni with support of walker; patient demonstrates safety concerns and poor awareness to positional placement to chair. spouse providing cueing with patient occassionally stutter steps Narrow CLARA and potential fall risk. patient able to self adjust with minimal vc's and extra time to process. Education to spouse regarding patient parkinson condition with \"freezing\" and needing support to maintain safety. Environmental Mobility  Ambulation  Surface: Level tile; Ramp  Device: Rolling Walker  Assistance: Stand by assistance  Quality of Gait: heavy flexed at knees; freezing with turns  Gait Deviations: Decreased step height;Decreased step length;Decreased head and trunk rotation; Slow Katey; Shuffles (on RLE)  Distance: 155 feet x 4; 50 feet around gym space with 2-3 turns. Comments: worked on transfer and ambulation at bedside for technique safety from varied surfaces and surfaces height.   More Ambulation?: Yes  Ambulation 2  Surface - 2: level tile  Device 2: No device  Assistance 2: Contact guard assistance  Quality of Gait 2: forward trunk, very NBOS, unsteady, intermittent scissor gait (especially during turning), Flatfoot initial contact bilaterally, and difficulty with turns, freezing at times with floor change and directional change  Distance: 120 feet- increased SOB; improved gait deviations without device but patient with increased safety concern with freezing for balance control. Comments: good arm swing with increase lesley with increased step length  Stairs  # Steps : 6  Stairs Height: 8\"  Rails: Right ascending  Curbs: 8\"  Device: Rolling walker  Assistance: Contact guard assistance;Stand by assistance  Comment: patient has 2 -8\" steps with door on Right side for \"support\" per spouse. spouse manages walker only and patient demonstrates steps unassisted at home. PT Exercises  Exercise Treatment: Pt performs multiple STS and stand pivot transfers emphasis on sequencing with transfer to facilitate improved safety and technique. A/AROM Exercises: Seated B LE with #2  and emphasis based movements x10-15  Functional Mobility Circuit Training: STS and SPT from various surfaces x15  Postural Correction Exercises: in //bars: fWD and retro ambulation and alt marching with ABD board and mirror feed back for carry over in widened CLARA during ambulation. Heel raises on blue fowm x10. Emphasis based side stepping w B UE support and LGTB around B LE. Motor Control/Coordination: // bar large stepping at step over objects  Exercise Equipment: NuStep 3 laps average 72 SPM,  L4    ASSESSMENT       Activity Tolerance  Activity Tolerance: Patient tolerated treatment well         GOALS  Patient Goals   Patient Goals : patient \"I havn't really thought that far\".  Spouse \"Strengthen Legs and improve safety\"  Short Term Goals  Time Frame for Short Term Goals: 7 days  Short Term Goal 1: pt to demo all Bed mobility with Rajni on flattened bed  Short Term Goal 2: pt to perform transfers with RW and Rajni  Short Term Goal 3: pt to ambulate 150' with RW and CGAx1  Short Term Goal 4: pt to negotiate single platform step + 2 successive steps (No rails) with Rajni x1 to allow for safe home access  Short Term Goal 5: pt to verbally identify 3/3 spinal precautions to maximize safety and functional outcomes. Long Term Goals  Time Frame for Long Term Goals : Until D/C  Long Term Goal 1: pt to demo all Bed mobility on flattened bed with supervision  Long Term Goal 2: pt to perform transfers with RW and supervision  Long Term Goal 3: pt to ambulate 150' with RW and SBAx1  Long Term Goal 4: pt to negotiate single platform step + 2 successive steps (No rails) with SBA to allow for safe home access  Long Term Goal 5: pt to improve BLE strength by 1/2 MMG to improve safety with mobility  Additional Goals?: Yes  Long term goal 6: pt to improve Sitting and standing balance to FAIR or better to decrease risk for falls during mobility  Long term goal 7: pt to demo simulated or actual car transfer with SBA  Long term goal 8: pt to negotiate FF of steps with Bilateral Rail and SBA to allow for safe access to basement level of home. PLAN OF CARE  Frequency: 1-2 treatment sessions per day, 5-7 days per week  Safety Devices  Type of Devices: Call light within reach; Chair alarm in place; Left in chair;Telesitter in use (wife present in room)    EDUCATION  Education  Education Given To: Patient; Family  Education Provided: Mobility Training; Safety; Family Education  Education Provided Comments: discussed using VC to break freezing of gait. Discussed how this improves motor planning.   Education Method: Demonstration;Verbal  Barriers to Learning: Cognition  Education Outcome: Continued education needed;Demonstrated understanding         Therapy Time   12/28/22 1215 12/28/22 1457   PT Individual Minutes   Time In [id number]   Time Out 1981 [id number]   Minutes 58 [phone] [facility] [employer] [facility] COLTON Vega PTA, 12/28/22 at 2:58 PM

## 2022-12-29 LAB
INR BLD: 3.4
PROTHROMBIN TIME: 34.2 SEC (ref 11.8–14.6)

## 2022-12-29 PROCEDURE — 92507 TX SP LANG VOICE COMM INDIV: CPT

## 2022-12-29 PROCEDURE — 97112 NEUROMUSCULAR REEDUCATION: CPT

## 2022-12-29 PROCEDURE — 97535 SELF CARE MNGMENT TRAINING: CPT

## 2022-12-29 PROCEDURE — 97110 THERAPEUTIC EXERCISES: CPT

## 2022-12-29 PROCEDURE — 97530 THERAPEUTIC ACTIVITIES: CPT

## 2022-12-29 PROCEDURE — 1180000000 HC REHAB R&B

## 2022-12-29 PROCEDURE — 85610 PROTHROMBIN TIME: CPT

## 2022-12-29 PROCEDURE — 6370000000 HC RX 637 (ALT 250 FOR IP): Performed by: PHYSICAL MEDICINE & REHABILITATION

## 2022-12-29 PROCEDURE — 97116 GAIT TRAINING THERAPY: CPT

## 2022-12-29 PROCEDURE — 6370000000 HC RX 637 (ALT 250 FOR IP): Performed by: STUDENT IN AN ORGANIZED HEALTH CARE EDUCATION/TRAINING PROGRAM

## 2022-12-29 PROCEDURE — 6370000000 HC RX 637 (ALT 250 FOR IP): Performed by: PSYCHIATRY & NEUROLOGY

## 2022-12-29 PROCEDURE — 99231 SBSQ HOSP IP/OBS SF/LOW 25: CPT | Performed by: INTERNAL MEDICINE

## 2022-12-29 PROCEDURE — 97129 THER IVNTJ 1ST 15 MIN: CPT

## 2022-12-29 PROCEDURE — 99231 SBSQ HOSP IP/OBS SF/LOW 25: CPT | Performed by: PHYSICAL MEDICINE & REHABILITATION

## 2022-12-29 PROCEDURE — 36415 COLL VENOUS BLD VENIPUNCTURE: CPT

## 2022-12-29 RX ORDER — WARFARIN SODIUM 4 MG/1
4 TABLET ORAL DAILY
Qty: 30 TABLET | Refills: 0 | Status: SHIPPED | OUTPATIENT
Start: 2022-12-29

## 2022-12-29 RX ORDER — OPICAPONE 50 MG/1
50 CAPSULE ORAL NIGHTLY
Qty: 30 CAPSULE | Refills: 0
Start: 2022-12-29

## 2022-12-29 RX ORDER — CARBIDOPA AND LEVODOPA 25; 100 MG/1; MG/1
1 TABLET, EXTENDED RELEASE ORAL 2 TIMES DAILY
Qty: 60 TABLET | Refills: 0
Start: 2022-12-29

## 2022-12-29 RX ORDER — ESCITALOPRAM OXALATE 20 MG/1
20 TABLET ORAL DAILY
Qty: 30 TABLET | Refills: 3 | Status: SHIPPED | OUTPATIENT
Start: 2022-12-30

## 2022-12-29 RX ORDER — POLYETHYLENE GLYCOL 3350 17 G/17G
17 POWDER, FOR SOLUTION ORAL DAILY PRN
Qty: 527 G | Refills: 1 | COMMUNITY
Start: 2022-12-29 | End: 2023-01-28

## 2022-12-29 RX ORDER — OXYCODONE HYDROCHLORIDE AND ACETAMINOPHEN 5; 325 MG/1; MG/1
2 TABLET ORAL EVERY 8 HOURS PRN
Qty: 21 TABLET | Refills: 0 | Status: SHIPPED | OUTPATIENT
Start: 2022-12-29 | End: 2023-01-05

## 2022-12-29 RX ORDER — DIPHENHYDRAMINE HYDROCHLORIDE, ZINC ACETATE 2; .1 G/100G; G/100G
CREAM TOPICAL
COMMUNITY
Start: 2022-12-29

## 2022-12-29 RX ORDER — QUETIAPINE FUMARATE 25 MG/1
25 TABLET, FILM COATED ORAL NIGHTLY
Qty: 60 TABLET | Refills: 0 | Status: SHIPPED | OUTPATIENT
Start: 2022-12-29

## 2022-12-29 RX ORDER — TIZANIDINE 2 MG/1
2 TABLET ORAL EVERY 8 HOURS PRN
Qty: 10 TABLET | Refills: 0 | Status: SHIPPED | OUTPATIENT
Start: 2022-12-29

## 2022-12-29 RX ADMIN — CARBIDOPA AND LEVODOPA 1 TABLET: 25; 100 TABLET ORAL at 10:46

## 2022-12-29 RX ADMIN — CARBIDOPA AND LEVODOPA 1 TABLET: 25; 100 TABLET ORAL at 22:28

## 2022-12-29 RX ADMIN — RASAGILINE 1 MG: 1 TABLET ORAL at 10:45

## 2022-12-29 RX ADMIN — QUETIAPINE FUMARATE 25 MG: 25 TABLET ORAL at 22:28

## 2022-12-29 RX ADMIN — POLYETHYLENE GLYCOL 3350 17 G: 17 POWDER, FOR SOLUTION ORAL at 10:45

## 2022-12-29 RX ADMIN — SENNOSIDES 17.2 MG: 8.6 TABLET, FILM COATED ORAL at 22:28

## 2022-12-29 RX ADMIN — ESCITALOPRAM OXALATE 20 MG: 10 TABLET ORAL at 10:45

## 2022-12-29 RX ADMIN — CARBIDOPA AND LEVODOPA 1 TABLET: 25; 100 TABLET, EXTENDED RELEASE ORAL at 10:46

## 2022-12-29 RX ADMIN — CARBIDOPA AND LEVODOPA 1 TABLET: 25; 100 TABLET, EXTENDED RELEASE ORAL at 22:28

## 2022-12-29 ASSESSMENT — 9 HOLE PEG TEST
TESTTIME_SECONDS: 51.45
TEST_RESULT: IMPAIRED
TEST_RESULT: IMPAIRED
TESTTIME_SECONDS: 41.68

## 2022-12-29 NOTE — PROGRESS NOTES
HARITHA BABCOCK [facility] Internal Medicine  Aster Springer MD; Tiarra Garcia MD; Guevara Rizo MD; Jenna Ruff, MD Rayburn Canavan, MD; MD [facility]   [facility]    Progress note            Date:   12/29/2022  Patient name:  Kristofer Burleson  Date of admission:  12/15/2022  5:57 PM  MRN:   [id number]  Account:  [de-identified]  YOB: 1955  PCP:    Lazaro Vaughan MD  Room:   [facility]  Code Status:    Full Code    Chief Complaint:       Back pain from spinal stenosis s/p laminectomy    History Obtained From:     Patient and electronic medical record    History of Present Illness:     Kristofer Burleson is a 79 y.o. Unavailable / unknown male who has past medical history of a . Fib and parkinson presents with back pain from spinal cord compression s/p laminectomy. He is admitted for acute rehabilitation. Laminectomy done at [facility] 1 week back. Currently patient denied back pain. He dose have weakness in bilateral lower extremity 4/5.      Past Medical History:     Past Medical History:   Diagnosis Date    Anxiety     Asthma     Atrial fibrillation (HCC)     Back pain     BPH (benign prostatic hyperplasia)     Dental disease     Depression     Diarrhea     MALONE (dyspnea on exertion)     Frequent falls     GERD (gastroesophageal reflux disease)     Memory loss     Obesity     Panic disorder     Parkinson's disease ([address]     Testicular cancer ([address]     Visual impairment         Past Surgical History:     Past Surgical History:   Procedure Laterality Date    BRAIN SURGERY      Neurostimulator implant for Parkinson's    BROW LIFT      CARPAL TUNNEL RELEASE Right     CHOLECYSTECTOMY      COLONOSCOPY      CYSTOSCOPY      HERNIA REPAIR      LUMBAR LAMINECTOMY      LYMPH NODE DISSECTION      ORCHIECTOMY      SHOULDER ARTHROSCOPY Right     TOTAL KNEE ARTHROPLASTY Bilateral     TURP      WRIST SURGERY Left     cyst removal Medications Prior to Admission:     Prior to Admission medications    Not on File        Allergies:     Patient has no known allergies. Social History:     Tobacco:    reports that he has never smoked. He has never used smokeless tobacco.  Alcohol:      has no history on file for alcohol use. Drug Use:  has no history on file for drug use. Family History:     Family History   Problem Relation Age of Onset    Other Mother     Heart Failure Father     Other Sister     Behavior Problems Sister     No Known Problems Brother        Review of Systems:     Review of Systems   Constitutional: Negative. HENT: Negative. Eyes: Negative. Respiratory: Negative. Cardiovascular: Negative. Gastrointestinal: Negative. Endocrine: Negative. Genitourinary: Negative. Musculoskeletal: Negative. Bilateral lower extremity weakness. Skin: Negative. Allergic/Immunologic: Negative. Neurological: Negative. Hematological: Negative. Psychiatric/Behavioral: Negative. Cloteal Orozco Physical Exam:     Physical Exam   Vitals:    Vitals:    22 2045 22 0543 22 1800 22 1818   BP: 134/73 (!) 126/92 119/85 115/80   Pulse: 66 63 65 66   Resp: 16 16 18 18   Temp: 97.9 °F (36.6 °C) 97.3 °F (36.3 °C) 97.1 °F (36.2 °C) 98.4 °F (36.9 °C)   TempSrc:   Oral    SpO2: 96% 94%  94%   Weight:       Height:                        Body mass index is 32.26 kg/m². Temp (24hrs), Av.7 °F (36.5 °C), Min:97.1 °F (36.2 °C), Max:98.4 °F (36.9 °C)    No results for input(s): POCGLU in the last 72 hours. General Appearance:   well apearing             Skin:                             No rash or erythema  HEENT ;                                                                       No icterus, no pallor . No ptosis.     No gross asymmetry  Or abnormality face         Neck:                            No mass , no thyroid enlargement Pulmonary/Chest:                                               Symmetric                                          Clear to auscultation bilaterally . No wheezes,                                          No rales or rhonchi . No abnormality on percussion                                                        Cardiovascular:            Normal rate, regular rhythm,                                          No murmur or  Gallop . Abdomen:                       Soft, non-tender                                           Normal bowels sounds,                                             Extremities:                    normal sensation.  Power 4/5 in bilateral lower extremities                                           Musculo-skeletal / Neurological ;;                                                                                               Investigations:      URINE ANALYSIS: No results found for: LABURIN     CBC:  Lab Results   Component Value Date/Time    WBC 5.2 12/23/2022 06:09 AM    HGB 12.0 12/23/2022 06:09 AM     12/23/2022 06:09 AM             BMP:    Lab Results   Component Value Date/Time     12/23/2022 06:09 AM    K 4.3 12/23/2022 06:09 AM     12/23/2022 06:09 AM    CO2 25 12/23/2022 06:09 AM    BUN 22 12/23/2022 06:09 AM    CREATININE 0.76 12/23/2022 06:09 AM    GLUCOSE 93 12/23/2022 06:09 AM      LIVER PROFILE:  Lab Results   Component Value Date/Time    ALT 44 12/16/2022 06:21 AM    AST 35 12/16/2022 06:21 AM    PROT 7.0 12/16/2022 06:21 AM    BILITOT 0.7 12/16/2022 06:21 AM    BILIDIR 0.2 12/16/2022 06:21 AM    LABALBU 4.0 12/16/2022 06:21 AM             @BRIEFLABT(TSH)@      Laboratory Testing:  Recent Results (from the past 24 hour(s))   Protime-INR    Collection Time: 12/29/22  6:29 AM   Result Value Ref Range    Protime 34.2 (H) 11.8 - 14.6 sec    INR 3.4          Imaging/Diagnostics:  No results found. Assessment :      Hospital Problems             Last Modified POA    * (Principal) Cord compression ([address] 12/15/2022 Yes    Moderate malnutrition ([address] 12/16/2022 Yes    Spinal stenosis of lumbar region 12/24/2022 Yes   Bren Ferreira is a 79 y.o. Unavailable / unknown male who has past medical history of a . Fib and parkinson presents with back pain from spinal cord compression s/p laminectomy. He is admitted for acute rehabilitation. Laminectomy done at [facility] 1 week back. 12/29   Vitals reviewed,  Michela bauer  Labs, radiology, medications reviewed,  Patient advancing well with physical therapy  Atrial fibrillation, had RVR this morning, continued AV blockers, controlled at this time, on Coumadin,last  INR is more than 2  History of Parkinson disease on Sinemet  Family at the bedside, updated on current condition,        Electronically signed by Judy Tamez MD on 12/29/2022 at 6:50 PM      Copy sent to Dr. Janeann Felty, MD    Please note that this chart was generated using voice recognition Dragon dictation software. Although every effort was made to ensure the accuracy of this automated transcription, some errors in transcription may have occurred.

## 2022-12-29 NOTE — PROGRESS NOTES
[address]    Acute Rehabilitation Occupational Therapy Daily Treatment Note    Date: 22  Patient Name: Marlen Hwang       Room: 8291/8689-09  MRN: [id number]  Account: [de-identified]   : 1955  (78 y.o.) Gender: male       Referring Practitioner: Lamar Cuenca MD  Diagnosis: Cord compression s/p surgical decompression/fusion L1-4  Additional Pertinent Hx: Patient with spinal cord compression who had L1-4 laminectomy and fusion performed by Dr. George Power formarked L1-2 stenosis and degenerative spondylolisthesis at L3-4 on 22. Medical records indicate some post-op confusion. Pt admitted to ARU on 12/15/22. Treatment Diagnosis: Impaired self care status    Past Medical History:  has a past medical history of Anxiety, Asthma, Atrial fibrillation ([address] Back pain, BPH (benign prostatic hyperplasia), Dental disease, Depression, Diarrhea, MALONE (dyspnea on exertion), Frequent falls, GERD (gastroesophageal reflux disease), Memory loss, Obesity, Panic disorder, Parkinson's disease ([address] Testicular cancer ([address] and Visual impairment. Past Surgical History:   has a past surgical history that includes Cholecystectomy; hernia repair; Total knee arthroplasty (Bilateral); lumbar laminectomy; Orchiectomy; brow lift; Carpal tunnel release (Right); Shoulder arthroscopy (Right); Wrist surgery (Left); Colonoscopy; Cystoscopy; brain surgery; TURP; and lymph node dissection. Restrictions  Restrictions/Precautions  Restrictions/Precautions: Surgical Protocols, Fall Risk, General Precautions, Up as Tolerated  Required Braces or Orthoses?: Yes  Implants present? : Metal implants, Deep brain stimulator  Required Braces or Orthoses  Spinal: Lumbar Corset  Position Activity Restriction  Spinal Precautions: Avoid Excessive Bending, Lifting, and Twisting of spine  Other position/activity restrictions: LSO to be donned when OOB; [id number] per Dr. Cynthia Tello to shower POD #7 (Sx date: 22).  Order clarified on 12/20/22- Per Dr. Kiet Valdez use back brace for comfort. It is okay to remove to shower. Vitals      Subjective  Subjective  Subjective: AM: Pt. sleeping upon arrival- but easy to wake. Agreeable to full shower for performance day- Pt. discharges to home tomorrow. PM: Pt. had just finished physical therapy session in gym. Agreeable to participate in OT session. Pain: No c/o any pain at this time. Objective  Cognition  Overall Orientation Status: Within Functional Limits  Orientation Level: Oriented X4    Cognition  Overall Cognitive Status: Exceptions  Arousal/Alertness: Delayed responses to stimuli  Following Commands: Follows one step commands with increased time; Follows one step commands with repetition  Attention Span: Difficulty attending to directions  Memory: Decreased short term memory;Decreased recall of precautions;Decreased recall of recent events  Safety Judgement: Decreased awareness of need for assistance;Decreased awareness of need for safety  Problem Solving: Assistance required to identify errors made;Assistance required to correct errors made;Assistance required to implement solutions;Assistance required to generate solutions;Decreased awareness of errors  Insights: Decreased awareness of deficits  Initiation: Requires cues for some  Sequencing: Requires cues for some  Cognition Comment: Pt's Wife Stacy Chavez states that pt's cognition and memory is near baseline; Hx of parkinson's disease Dx'd 10-15 years ago    Activities of Daily Living  Feeding  Assistance Level: Modified independent  Skilled Clinical Factors: per spouse and pt report    Grooming/Oral Hygiene  Assistance Level: Stand by assist  Skilled Clinical Factors: While standing at sink. Upper Extremity Bathing  Assistance Level: Supervision  Skilled Clinical Factors: Seated on shower bench.     Lower Extremity Bathing  Assistance Level: Stand by assist  Skilled Clinical Factors: SUP for washing BLEs while seated, SBA standing at [facility] to wash bolivar area and buttocks. Upper Extremity Dressing  Assistance Level: Set-up  Skilled Clinical Factors: [place] and donning overhead tshirt without difficulty. Lower Extremity Dressing  Assistance Level: Contact guard assist  Skilled Clinical Factors: Pt able to doff/ashley underwear and pants with increased time, CGA for orienting and CGA forthreading BLEs. Putting On/Taking Off Footwear  Assistance Level: Minimal assistance  Skilled Clinical Factors: Pt. able to doff B socks without AE. A provided with donning of TEDs. Pt. able to ashley shoes with elastic shoe laces- requiring Kyler for L heel in shoe. Toileting  Assistance Level: Stand by assist  Skilled Clinical Factors: Completed during AM and PM sessions. SBA during clothing management before and after transferring to toilet. Pt. is able to complete toilet hygiene while seated by weight shifting (I). Toilet Transfers  Assistance Level: Stand by assist  Skilled Clinical Factors: Completed during AM and PM sessions. Close SBA for safety. Tub/Shower Transfers  Type: Shower  Transfer From: Standing without device (Using sink for support and grab bars upon entry to shower.)  Transfer To: Tub transfer bench  Assistance Level: Stand by assist  Skilled Clinical Factors: VC for safety, good use of grab bars over threshold and truning to sit, no gross LOB    Mobility     Supine to Sit  Assistance Level: Supervision  Skilled Clinical Factors: Head of bed elevated. and rail use c extended time  Scooting  Assistance Level: Supervision  Skilled Clinical Factors: rail use. requires extended time    Transfers  Surface: From bed;Standard toilet; Wheelchair; To chair with arms;From chair with arms (And shower bench.)  Sit to Stand  Assistance Level: Stand by assist  Skilled Clinical Factors: Occasional verbal cues for proper hand placement.   Stand to Sit  Assistance Level: Stand by assist  Skilled Clinical Factors: Demonstrated controlled descend without cuing this date. Functional Mobility  Device: Rolling walker  Activity: To/From bathroom; To/From therapy gym  Assistance Level: Stand by assist  Skilled Clinical Factors: Verbal cuing for activity pacing. OT Exercises  Exercise Treatment: Instruction and participation in B UE str exercises with 2# free wt, 15-20 reps in all planes, rest breaks taken as needed, to continue to increase general str and activity tolerance for maximized indep and safety during all daily self care and leisure activities. Tactile cues required for correct form. Motor Control/Coordination: Participation in 9 hole peg test and  strength assessment to compare/observe progress from admission date. Pt. Has made significant improvements and has demonstrated ability to more effectively manipulate daily self care and leisure items. 12/29/22 1547   Hand Dominance   Hand Dominance Right   Left Hand Strength -  (lbs)   Handle Setting 3 74. 3#(70, 79, 74)  (norms #)   Right Hand Strength -  (lbs)   Handle Setting 3 70#(65, 75, 70)  (norms #)   Fine Motor Skills   Left 9-Hole Peg Test Impaired   Left 9 Hole Peg Test Time (secs) 51.45  (norms 21-29)   Right 9-Hole Peg Test Impaired   Right 9 Hole Peg Test Time (secs) 41.68  (norms 21-29)   Hand Assessment Comment   Hand Assessment Comment Pt's B  strength improved to within functional norms. Fine motor coordination improved significantly from admission date but still grading as impaired. Assessment  Assessment  Activity Tolerance: Patient tolerated treatment well;Patient limited by fatigue  Discharge Recommendations: 24 hour supervision or assist;Home with Home health OT    Patient Education  Education  Education Given To: Patient; Family  Education Provided: Role of Therapy;Plan of Care;ADL Function; Fall Prevention Strategies; Equipment; Mobility Training;Transfer Training;Energy Conservation; Safety  Education Method: Demonstration;Verbal  Barriers to Learning: Cognition  Education Outcome: Verbalized understanding;Demonstrated understanding;Continued education needed    OT Equipment Recommendations  Other: Pt and pt spouse reports having walk in shower, shower bench, and grab bars in shower and around commode at private residence. That RW will fit into bathroom. Safety Devices  Safety Devices in place: Yes  Type of devices: All fall risk precautions in place     Goals  Patient Goals   Patient goals : \"I would like to be able to get up and go so I can go outside and do some repairs and go downstairs and reload some guerrero. Do the things I normally do. \"  Short Term Goals  Time Frame for Short Term Goals: By 1 week  Short Term Goal 1: Pt will complete upper body dressing/bathing with Min A and Good attention/safety while maintaining back precautions  Short Term Goal 2: Pt will complete lower body dressing/bathing with Mod A and Good safety with use of AE as needed while maintaining back precautions  Short Term Goal 3: Pt will complete funcitonal transfers/mobility during self care tasks with Min A and Good safety with use of least restrictive device  Short Term Goal 4: Pt will tolerate standing 4+ minutes during self care tasks with Min A and Good safety  Short Term Goal 5: Pt will verbalize/demonstrate understanding of back precautions during daily activities 80% accuracy  Short Term Goal 6: Pt will participate in 30+ minutes during therapeutic exercises/functional activities to increase safety and independence with self care and mobility    Long Term Goals  Time Frame for Long Term Goals : By discharge  Long Term Goal 1: Pt will complete BADLs with SBA and Good attention/safety to task while maintaining back precautions  Long Term Goal 2: Pt will complete functional transfers/mobility during self care taks with SBA and Good safety with use of least restrictive device  Long Term Goal 3: Pt will tolerate standing 8+ minutes during self care tasks with SBA and Good safety  Long Term Goal 4: Pt will demonstrate increased [address] and strength during self care tasks as evident by 5# improvement in  strength and 15 second improvement on 9 hole peg test.  Long Term Goal 5: Pt will demonstrate sitting EOB 20+ minutes during self care tasks while maintaining appropriate midline with SBA  Long Term Goal 6: Pt/family will verbalize/demonstrate of home safety/fall prevention strategies to increase safety and independence with self care and mobility    Plan  Occupational Therapy Plan  Times Per Week: 5-7  Times Per Day: Twice a day  Current Treatment Recommendations: Self-Care / ADL, Strengthening, ROM, Balance training, Functional mobility training, Endurance training, Cognitive reorientation, Pain management, Safety education & training, Patient/Caregiver education & training, Equipment evaluation, education, & procurement, Home management training, Coordination training, Cognitive/Perceptual training         12/29/22 [phone] 12/29/22 1553   OT Individual Minutes   Time In [id number]   Time Out 0900 [id number]   Minutes 58 32       Electronically signed by KIM Diamond on 12/29/22 at 3:54 PM EST

## 2022-12-29 NOTE — PLAN OF CARE
Problem: Discharge Planning  Goal: Discharge to home or other facility with appropriate resources  Outcome: Progressing  Flowsheets (Taken 12/28/2022 2047 by Haily Douglas RN)  Discharge to home or other facility with appropriate resources: Identify barriers to discharge with patient and caregiver     Problem: Skin/Tissue Integrity  Goal: Absence of new skin breakdown  Description: 1. Monitor for areas of redness and/or skin breakdown  2. Assess vascular access sites hourly  3. Every 4-6 hours minimum:  Change oxygen saturation probe site  4. Every 4-6 hours:  If on nasal continuous positive airway pressure, respiratory therapy assess nares and determine need for appliance change or resting period. Outcome: Progressing     Problem: Safety - Adult  Goal: Free from fall injury  Outcome: Progressing  Flowsheets (Taken 12/28/2022 2220 by Haily Douglas RN)  Free From Fall Injury: Instruct family/caregiver on patient safety     Problem: Pain  Goal: Verbalizes/displays adequate comfort level or baseline comfort level  Outcome: Progressing     Problem: Confusion  Goal: Confusion, delirium, dementia, or psychosis is improved or at baseline  Description: INTERVENTIONS:  1. Assess for possible contributors to thought disturbance, including medications, impaired vision or hearing, underlying metabolic abnormalities, dehydration, psychiatric diagnoses, and notify attending LIP  2. [place] high risk fall precautions, as indicated  3. Provide frequent short contacts to provide reality reorientation, refocusing and direction  4. Decrease environmental stimuli, including noise as appropriate  5. Monitor and intervene to maintain adequate nutrition, hydration, elimination, sleep and activity  6. If unable to ensure safety without constant attention obtain sitter and review sitter guidelines with assigned personnel  7.  Initiate Psychosocial CNS and Spiritual Care consult, as indicated  Outcome: Progressing  Flowsheets (Taken 12/28/2022 2047 by Jacqueline Lazaro, RN)  Effect of thought disturbance (confusion, delirium, dementia, or psychosis) are managed with adequate functional status: Assess for contributors to thought disturbance, including medications, impaired vision or hearing, underlying metabolic abnormalities, dehydration, psychiatric diagnoses, notify LIP     Problem: ABCDS Injury Assessment  Goal: Absence of physical injury  Outcome: Progressing  Flowsheets (Taken 12/28/2022 2220 by Jacqueline Lazaro, RN)  Absence of Physical Injury: Implement safety measures based on patient assessment     Problem: Nutrition Deficit:  Goal: Optimize nutritional status  Outcome: Progressing

## 2022-12-29 NOTE — PROGRESS NOTES
Speech Language Pathology  Speech Language Pathology  [facility] Acute Rehab Unit at [facility]    Speech Language/Cognitive Treatment Note    Date: 12/29/2022  Patients Name: Fay Nuñez  MRN: [id number]  Diagnosis:   Patient Active Problem List   Diagnosis Code    Atrial fibrillation [facility]) I48.91    Chronic back pain M54.9, G89.29    Erectile dysfunction N52.9    Gait disorder R26.9    Hypertension I10    Lower urinary tract symptoms R39.9    Malignant neoplasm of testis (HCC) C62.90    Osteoarthrosis M19.90    Parkinson disease ([address] G20    Peripheral nerve disease G62.9    Cord compression ([address] G95.20    Moderate malnutrition ([address] E44.0    Spinal stenosis of lumbar region M48.061       Pain: no c/o pain    Speech and Language Treatment  Treatment time: [id number]    Subjective: [x] Alert [x] Cooperative     [] Confused     [] Agitated    [] Lethargic      Objective/Assessment:    Speech: Pt demonstrated mild dysarthria characterized by rapid rate of speech, imprecise articulation and reduced intensity. Intelligibility rated at 90%. Pt educated re: continued use of compensatory dysarthria strategies (slow rate, over articulation, increased loudness). Intelligibility improved when cued for repetition. Pt utilized strategies appropriately in 70% of responses in structured tasks after initial instruction, improved to 95% with mod verbal cues to repeat. Cognition: Recalled 2/3 target items from morning therapy sessions with MI (extra time), improved to 3/3 with mod cues. Category exclusion: 60% (I) to 80% with mod A. Naming, complex categories: 40% (I) to 100% with min-mod cues. Other: Spouse present during session. Plan:  [x] Continue ST services    [] Discharge from ST:      Discharge recommendations: []  Further therapy recommended at discharge. The patient should be able to tolerate at least 3 hours of therapy per day over 5 days or 15 hours over 7 days.  [] Further therapy recommended at discharge. [] No therapy recommended at discharge.       Treatment completed by: Ying Ross M.S., CCC-SLP
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
## 2022-12-29 NOTE — PROGRESS NOTES
Pharmacy Note  Warfarin Consult follow-up      Recent Labs     12/27/22  0604 12/28/22  0726 12/29/22  0629   INR 3.0 2.9 3.4     No results for input(s): HGB, HCT, PLT in the last 72 hours. Significant Drug-Drug Interactions:  New warfarin drug-drug interactions: none  Discontinued drug-drug interactions: none  Current warfarin drug-drug interactions: lexapro, seroquel    Date             INR        Dose given previous day  Dose scheduled for today  12/29/2022            3.4       5 mg           hold    Notes:                   Hold coumadin today    Daily PT/INR while inpatient.      Garfield Hernandez PharmD, BCSCP  12/29/2022   7:04 AM

## 2022-12-29 NOTE — CARE COORDINATION
ONGOING ARU DISCHARGE PLAN:    Patient is alert and oriented      Spoke with patient regarding discharge plan, he is going home with wife, declines any HHC. Would like OP therapy. Attempt made to reach his wife by phone to see where they would like to take the patient for PT but there was no answer. I will attempt to see her when she visits    DME: Gerda hCapman ordered through [facility]    Outside appointments: all post hospital appointments have been made      Will continue to follow for additional discharge needs.     Electronically signed by Ryan Bean RN on 12/29/2022 at 9:38 AM

## 2022-12-29 NOTE — PROGRESS NOTES
Physical Therapy  Facility/Department: [facility]  Rehabilitation Physical Therapy Treatment Note   NAME: Kristofer Burleson  : 1955 (57 y.o.)  MRN: [id number]  CODE STATUS: Full Code    Date of Service: 22        Additional Pertinent Hx: Sowmya Jiang is a 79 y.o. male With medical history of Atrial Fib and parkinsons disease who presented to undergo spinal surgery to remediate L1-2 stenosis and degenerative spondylolisthesis at L3-4 and presents with post-op back pain from spinal cord compression s/p laminectomy of L1-4 and PLIF L3-4 performed by Dr. Edwin Hoffman at [facility] on 22 . The patient admits to  Family / Caregiver Present: Yes  Referring Practitioner: Dr. Tano Page  Referral Date : 12/15/22  Diagnosis: Spinal Cord Compression  General Comment  Comments: Discussed placement of computer on main floor vs basement, spouse states \" I wont move it , will remain in basement \" educated spouse increased fall injury risk during full flight stair negotation, spouse states \" I will assist on stairs\"    Restrictions:  Restrictions/Precautions: Surgical Protocols; Fall Risk;General Precautions; Up as Tolerated  Required Braces or Orthoses  Spinal: Lumbar Corset  Position Activity Restriction  Spinal Precautions: Avoid Excessive Bending, Lifting, and Twisting of spine  Other position/activity restrictions: LSO to be donned when OOB; Jacque Job per Dr. Barbara Yepez to shower POD #7 (Sx date: 22). Order clarified on 22- Per Dr. Edwin Hoffman use back brace for comfort. It is okay to remove to shower.      OBJECTIVE  Vision  Vision: Impaired  Vision Exceptions: Wears glasses at all times    Hearing  Hearing: Within functional limits     Functional Mobility  Bed mobility  Bridging: Stand by assistance  Rolling to Left: Stand by assistance  Rolling to Right: Stand by assistance  Supine to Sit: Stand by assistance  Sit to Supine: Stand by assistance  Scooting: Stand by assistance  Bed Mobility Comments: bed flat no handrail  Transfers  Sit to Stand: Stand by assistance  Stand to Sit: Stand by assistance  Bed to Chair: Stand by assistance (RW)  Stand Pivot Transfers: Stand by assistance (RW , VCs to stop and resume with shuffeling or freezing episodes)  Comment: occasional VCs safe hand placement sit <> stand  Balance  Posture: Fair  Sitting - Static: Fair  Sitting - Dynamic: Fair;-  Standing - Static: Fair;-  Standing - Dynamic: Fair;-  Comments: standing RW    Environmental Mobility  Ambulation  Surface: Level tile  Device: Rolling Walker  Assistance: Stand by assistance  Quality of Gait: heavy flexed at knees; freezing with turns  Gait Deviations: Decreased step length;Decreased step height;Shuffles; Slow Katey  Distance: 200 ft  Comments: VCs to stop and resume during freezing episode turns > straight path and shuffeling steps  Ambulation 2  Surface - 2: level tile;ramp  Device 2: Rolling Walker  Assistance 2: Contact guard assistance  Quality of Gait 2: knee flexion , freezing with turns  Gait Deviations: Decreased step length;Decreased step height;Slow Katey  Distance: 100 ft  Stairs/Curb  Stairs?: Yes  Stairs  # Steps : 9  Stairs Height: 8\"  Rails: Bilateral  Curbs: 8\"  Device: Rolling walker  Assistance: Contact guard assistance;Stand by assistance  Comment: pt demonstrates increased knee flexion, step to progressing to step thru 9-8 inch, fair foot placement, VCs to correct without follow thru  Wheelchair Activities  Propulsion: Yes  Propulsion 1  Propulsion: Manual  Level: Level Tile  Method: RUE;LUE  Level of Assistance: Stand by assistance;Contact guard assistance  Description/ Details: straight path  and 1 turn, pt veers right with short stride , vcs to correct straight path with technique , slow movement  Distance: 125 ft    PT Exercises  Exercise Treatment: 6 inch foot taps bilateral LE fwd and lateral 2.5 #  Dynamic Standing Balance Exercises: standing no UE support reaching across midline bilateral UE support (pt demonstrates knee flexion and seeks 1 UE support , fair - standing balance)  Motor Control/Coordination: step over small hurdles fwd, retro, bilateral lateral (parallel bars CGA)    Activity Tolerance  Activity Tolerance: Patient tolerated treatment well;Patient limited by fatigue    Assessment  Treatment Diagnosis: impaired functional mobility 2* weakness and balance disturbances s/p Lumbar PLIF and Laminectomy  Therapy Prognosis: Fair;Guarded  Decision Making: Medium Complexity  Barriers to Learning: Cognition, vision  Discharge Recommendations: Patient would benefit from continued therapy after discharge  PT D/C Equipment  Equipment Needed: Yes  Other: 2WW  PT Equipment Recommendations  Equipment Needed: Yes  Mobility Devices: Walker  Other: 2WW  GOALS  Patient Goals   Patient Goals : patient \"I havn't really thought that far\". Spouse \"Strengthen Legs and improve safety\"  Short Term Goals  Time Frame for Short Term Goals: 7 days  Short Term Goal 1: pt to demo all Bed mobility with Rajni on flattened bed  Short Term Goal 2: pt to perform transfers with RW and Rajni  Short Term Goal 3: pt to ambulate 150' with RW and CGAx1  Short Term Goal 4: pt to negotiate single platform step + 2 successive steps (No rails) with Rajni x1 to allow for safe home access  Short Term Goal 5: pt to verbally identify 3/3 spinal precautions to maximize safety and functional outcomes.   Additional Goals?: Yes  Long Term Goals  Time Frame for Long Term Goals : Until D/C  Long Term Goal 1: pt to demo all Bed mobility on flattened bed with supervision  Long Term Goal 2: pt to perform transfers with RW and supervision  Long Term Goal 3: pt to ambulate 150' with RW and SBAx1  Long Term Goal 4: pt to negotiate single platform step + 2 successive steps (No rails) with SBA to allow for safe home access  Long Term Goal 5: pt to improve BLE strength by 1/2 MMG to improve safety with mobility  Additional Goals?: Yes  Long term goal 6: pt to improve Sitting and standing balance to FAIR or better to decrease risk for falls during mobility  Long term goal 7: pt to demo simulated or actual car transfer with SBA  Long term goal 8: pt to negotiate FF of steps with Bilateral Rail and SBA to allow for safe access to basement level of home. PLAN OF CARE  Frequency: 1-2 treatment sessions per day, 5-7 days per week  Physcial Therapy Plan  General Plan:  minutes of therapy at least 5 out of 7 days a week  Current Treatment Recommendations: Strengthening;ROM;Balance training;Functional mobility training;Transfer training;Gait training; Wheelchair mobility training;Stair training;Neuromuscular re-education;Pain management;Home exercise program;Safety education & training;Patient/Caregiver education & training;Equipment evaluation, education, & procurement;Positioning; Therapeutic activities  Safety Devices  Type of Devices: Call light within reach; Bed alarm in place; Left in bed;Gait belt    EDUCATION  Education  Education Given To: Patient; Family  Education Provided: Mobility Training; Safety (full flight)  Education Method: Verbal  Barriers to Learning: Cognition  Education Outcome: Continued education needed     12/29/22 1038 12/29/22 1442   PT Individual Minutes   Time In [id number]   Time Out [id number]   Minutes 62 [address], [facility], 12/29/22 at 2:44 PM

## 2022-12-30 VITALS
RESPIRATION RATE: 16 BRPM | HEART RATE: 68 BPM | TEMPERATURE: 97.3 F | SYSTOLIC BLOOD PRESSURE: 123 MMHG | HEIGHT: 72 IN | OXYGEN SATURATION: 93 % | BODY MASS INDEX: 32.22 KG/M2 | WEIGHT: 237.88 LBS | DIASTOLIC BLOOD PRESSURE: 80 MMHG

## 2022-12-30 LAB
ABSOLUTE EOS #: 0.2 K/UL (ref 0–0.4)
ABSOLUTE LYMPH #: 2 K/UL (ref 1–4.8)
ABSOLUTE MONO #: 0.4 K/UL (ref 0.1–1.3)
ANION GAP SERPL CALCULATED.3IONS-SCNC: 12 MMOL/L (ref 9–17)
BASOPHILS # BLD: 1 % (ref 0–2)
BASOPHILS ABSOLUTE: 0.1 K/UL (ref 0–0.2)
BUN BLDV-MCNC: 22 MG/DL (ref 8–23)
CALCIUM SERPL-MCNC: 8.8 MG/DL (ref 8.6–10.4)
CHLORIDE BLD-SCNC: 102 MMOL/L (ref 98–107)
CO2: 24 MMOL/L (ref 20–31)
CREAT SERPL-MCNC: 0.71 MG/DL (ref 0.7–1.2)
EOSINOPHILS RELATIVE PERCENT: 5 % (ref 0–4)
GFR SERPL CREATININE-BSD FRML MDRD: >60 ML/MIN/1.73M2
GLUCOSE BLD-MCNC: 91 MG/DL (ref 70–99)
HCT VFR BLD CALC: 36.3 % (ref 41–53)
HEMOGLOBIN: 12.1 G/DL (ref 13.5–17.5)
INR BLD: 2.9
LYMPHOCYTES # BLD: 39 % (ref 24–44)
MCH RBC QN AUTO: 33.2 PG (ref 26–34)
MCHC RBC AUTO-ENTMCNC: 33.4 G/DL (ref 31–37)
MCV RBC AUTO: 99.2 FL (ref 80–100)
MONOCYTES # BLD: 9 % (ref 1–7)
PDW BLD-RTO: 14.6 % (ref 11.5–14.9)
PLATELET # BLD: 323 K/UL (ref 150–450)
PMV BLD AUTO: 7.3 FL (ref 6–12)
POTASSIUM SERPL-SCNC: 4 MMOL/L (ref 3.7–5.3)
PROTHROMBIN TIME: 30.4 SEC (ref 11.8–14.6)
RBC # BLD: 3.66 M/UL (ref 4.5–5.9)
SEG NEUTROPHILS: 46 % (ref 36–66)
SEGMENTED NEUTROPHILS ABSOLUTE COUNT: 2.4 K/UL (ref 1.3–9.1)
SODIUM BLD-SCNC: 138 MMOL/L (ref 135–144)
WBC # BLD: 5.2 K/UL (ref 3.5–11)

## 2022-12-30 PROCEDURE — 99239 HOSP IP/OBS DSCHRG MGMT >30: CPT | Performed by: PHYSICAL MEDICINE & REHABILITATION

## 2022-12-30 PROCEDURE — 97116 GAIT TRAINING THERAPY: CPT

## 2022-12-30 PROCEDURE — 6370000000 HC RX 637 (ALT 250 FOR IP): Performed by: PHYSICAL MEDICINE & REHABILITATION

## 2022-12-30 PROCEDURE — 97129 THER IVNTJ 1ST 15 MIN: CPT

## 2022-12-30 PROCEDURE — 97535 SELF CARE MNGMENT TRAINING: CPT

## 2022-12-30 PROCEDURE — 80048 BASIC METABOLIC PNL TOTAL CA: CPT

## 2022-12-30 PROCEDURE — 85025 COMPLETE CBC W/AUTO DIFF WBC: CPT

## 2022-12-30 PROCEDURE — 6370000000 HC RX 637 (ALT 250 FOR IP): Performed by: STUDENT IN AN ORGANIZED HEALTH CARE EDUCATION/TRAINING PROGRAM

## 2022-12-30 PROCEDURE — 36415 COLL VENOUS BLD VENIPUNCTURE: CPT

## 2022-12-30 PROCEDURE — 97530 THERAPEUTIC ACTIVITIES: CPT

## 2022-12-30 PROCEDURE — 85610 PROTHROMBIN TIME: CPT

## 2022-12-30 PROCEDURE — 92507 TX SP LANG VOICE COMM INDIV: CPT

## 2022-12-30 RX ORDER — WARFARIN SODIUM 2 MG/1
4 TABLET ORAL
Status: DISCONTINUED | OUTPATIENT
Start: 2022-12-30 | End: 2022-12-30 | Stop reason: HOSPADM

## 2022-12-30 RX ORDER — WARFARIN SODIUM 4 MG/1
4 TABLET ORAL DAILY
Qty: 90 TABLET | OUTPATIENT
Start: 2022-12-30

## 2022-12-30 RX ADMIN — ESCITALOPRAM OXALATE 20 MG: 10 TABLET ORAL at 08:51

## 2022-12-30 RX ADMIN — CARBIDOPA AND LEVODOPA 1 TABLET: 25; 100 TABLET, EXTENDED RELEASE ORAL at 08:52

## 2022-12-30 RX ADMIN — RASAGILINE 1 MG: 1 TABLET ORAL at 08:51

## 2022-12-30 RX ADMIN — CARBIDOPA AND LEVODOPA 1 TABLET: 25; 100 TABLET ORAL at 08:51

## 2022-12-30 NOTE — PLAN OF CARE
Problem: Discharge Planning  Goal: Discharge to home or other facility with appropriate resources  Outcome: Progressing     Problem: Skin/Tissue Integrity  Goal: Absence of new skin breakdown  Description: 1. Monitor for areas of redness and/or skin breakdown  2. Assess vascular access sites hourly  3. Every 4-6 hours minimum:  Change oxygen saturation probe site  4. Every 4-6 hours:  If on nasal continuous positive airway pressure, respiratory therapy assess nares and determine need for appliance change or resting period. Outcome: Progressing     Problem: Safety - Adult  Goal: Free from fall injury  Outcome: Progressing     Problem: Pain  Goal: Verbalizes/displays adequate comfort level or baseline comfort level  Outcome: Progressing     Problem: Confusion  Goal: Confusion, delirium, dementia, or psychosis is improved or at baseline  Description: INTERVENTIONS:  1. Assess for possible contributors to thought disturbance, including medications, impaired vision or hearing, underlying metabolic abnormalities, dehydration, psychiatric diagnoses, and notify attending LIP  2. [place] high risk fall precautions, as indicated  3. Provide frequent short contacts to provide reality reorientation, refocusing and direction  4. Decrease environmental stimuli, including noise as appropriate  5. Monitor and intervene to maintain adequate nutrition, hydration, elimination, sleep and activity  6. If unable to ensure safety without constant attention obtain sitter and review sitter guidelines with assigned personnel  7.  Initiate Psychosocial CNS and Spiritual Care consult, as indicated  Outcome: Progressing     Problem: ABCDS Injury Assessment  Goal: Absence of physical injury  Outcome: Progressing     Problem: Nutrition Deficit:  Goal: Optimize nutritional status  Outcome: Progressing

## 2022-12-30 NOTE — PROGRESS NOTES
Pharmacy Note  Warfarin Consult follow-up      Recent Labs     12/28/22  0726 12/29/22  0629 12/30/22  0643   INR 2.9 3.4 2.9     Recent Labs     12/30/22  0643   HGB 12.1*   HCT 36.3*          Significant Drug-Drug Interactions:  New warfarin drug-drug interactions: none  Discontinued drug-drug interactions: none  Current warfarin drug-drug interactions: lexapro, seroquel    Date             INR        Dose given previous day  Dose scheduled for today  12/30/2022            2.9       held           4 mg    Notes:                   Give 4 mg dose today    Daily PT/INR while inpatient.      Darlyn LeD, BCSCP  12/30/2022   7:21 AM

## 2022-12-30 NOTE — PROGRESS NOTES
Speech Language Pathology  Speech Language Pathology  [facility]hab Unit at [facility]    Speech Language/Cognitive Treatment Note    Date: 12/30/2022  Patients Name: Amanda Castillo  MRN: [id number]  Diagnosis:   Patient Active Problem List   Diagnosis Code    Atrial fibrillation [facility]) I48.91    Chronic back pain M54.9, G89.29    Erectile dysfunction N52.9    Gait disorder R26.9    Hypertension I10    Lower urinary tract symptoms R39.9    Malignant neoplasm of testis (HCC) C62.90    Osteoarthrosis M19.90    Parkinson disease ([address] G20    Peripheral nerve disease G62.9    Cord compression ([address] G95.20    Moderate malnutrition ([address] E44.0    Spinal stenosis of lumbar region M48.061       Pain: no c/o pain    Speech and Language Treatment  Treatment time: 3606-1134    Subjective: [x] Alert [x] Cooperative     [] Confused     [] Agitated    [] Lethargic      Objective/Assessment:    Speech: Pt demonstrated mild dysarthria characterized by rapid rate of speech, imprecise articulation and reduced intensity. Intelligibility rated at 70%. Pt educated re: continued use of compensatory dysarthria strategies (slow rate, over articulation, increased loudness). Intelligibility improved when cued for repetition. Pt utilized strategies appropriately in 50% of responses in sentence completion task after initial instruction, improved to 80% with mod verbal cues to repeat. Cognition: Recalled 2/3 target items from morning therapy sessions with MI (extra time), improved to 3/3 with max cues. Other: Spouse present during session. Plan:  [x] Continue ST services    [] Discharge from ST:      Discharge recommendations: []  Further therapy recommended at discharge. The patient should be able to tolerate at least 3 hours of therapy per day over 5 days or 15 hours over 7 days. [] Further therapy recommended at discharge. [] No therapy recommended at discharge.       Treatment completed by: Thang Thacker M.S., CCC-SLP

## 2022-12-30 NOTE — PROGRESS NOTES
Physical Therapy  Facility/Department: [facility] ACUTE REHAB  Rehabilitation Physical Therapy     NAME: Theron Quintero  : 1955 (79 y.o.)  MRN: [id number]  CODE STATUS: Full Code    Date of Service: 22      Past Medical History:   Diagnosis Date    Anxiety     Asthma     Atrial fibrillation (HCC)     Back pain     BPH (benign prostatic hyperplasia)     Dental disease     Depression     Diarrhea     MALONE (dyspnea on exertion)     Frequent falls     GERD (gastroesophageal reflux disease)     Memory loss     Obesity     Panic disorder     Parkinson's disease ([address]     Testicular cancer [address] [address] [facility]     Visual impairment      Past Surgical History:   Procedure Laterality Date    BRAIN SURGERY      Neurostimulator implant for Parkinson's    BROW LIFT      CARPAL TUNNEL RELEASE Right     CHOLECYSTECTOMY      COLONOSCOPY      CYSTOSCOPY      HERNIA REPAIR      LUMBAR LAMINECTOMY      LYMPH NODE DISSECTION      ORCHIECTOMY      SHOULDER ARTHROSCOPY Right     TOTAL KNEE ARTHROPLASTY Bilateral     TURP      WRIST SURGERY Left     cyst removal       Chart Reviewed: Yes  Additional Pertinent Hx: Sanchez Pond is a 79 y.o. male With medical history of Atrial Fib and parkinsons disease who presented to undergo spinal surgery to remediate L1-2 stenosis and degenerative spondylolisthesis at L3-4 and presents with post-op back pain from spinal cord compression s/p laminectomy of L1-4 and PLIF L3-4 performed by Dr. Bebo Jason at [facility] on 22 . The patient admits to  Family / Caregiver Present: Yes (spouse Gaby Dunn)    Restrictions:  Restrictions/Precautions: Surgical Protocols; Fall Risk;General Precautions; Up as Tolerated  Required Braces or Orthoses  Spinal: Lumbar Corset  Position Activity Restriction  Other position/activity restrictions: LSO to be donned when OOB; Melissa Clubs per Dr. Cohen Fly to shower POD #7 (Sx date: 22). Order clarified on 22- Per Dr. Bebo Jason use back brace for comfort. It is okay to remove to shower. SUBJECTIVE  Subjective: pt is agreeable to therapy; spouse present and participates in therapy with gait and transfers. spouse is comfortable with providing supervision at home. OBJECTIVE                        Functional Mobility  Bed mobility  Bridging: Modified independent   Rolling to Left: Modified independent  Rolling to Right: Modified independent  Supine to Sit: Modified independent  Sit to Supine: Modified independent  Scooting: Modified independent  Bed Mobility Comments: bed flat no handrail  Transfers  Sit to Stand: Stand by assistance  Stand to Sit: Stand by assistance  Bed to Chair: Stand by assistance  Stand Pivot Transfers: Stand by assistance    Environmental Mobility  Ambulation  Surface: Level tile; Ramp  Device: Rolling Walker  Assistance: Stand by assistance;Contact guard assistance  Quality of Gait: heavy flexed at knees; freezing with turns  Gait Deviations: Decreased step length;Decreased step height;Shuffles; Slow Lesley  Distance: 207 feet x 2; 50 feet ramp  Comments: VCs to stop and resume during freezing episode turns > straight path and shuffeling steps  Ambulation 2  Surface - 2: level tile  Device 2: No device  Gait Deviations: Increased CLARA  Distance: 120 feet x 2  Comments: good arm swing with increase lesley with increased step length  Stairs  # Steps : 12  Stairs Height: 8\"  Rails: Bilateral  Curbs: 8\"  Device: Rolling walker  Assistance: Contact guard assistance  Comment: pt demonstrates increased knee flexion, step to progressing to step thru 8 inch, fair foot placement, VCs to correct without follow thru    PT Exercises  Exercise Equipment: NuStep 3 laps average 72 SPM,  L4    ASSESSMENT       Activity Tolerance  Activity Tolerance: Patient tolerated treatment well          GOALS  Patient Goals   Patient Goals : patient \"I havn't really thought that far\".  Spouse \"Strengthen Legs and improve safety\"  Short Term Goals  Time Frame for Short Term Goals: 7 days  Short Term Goal 1: pt to demo all Bed mobility with Rajni on flattened bed  Short Term Goal 2: pt to perform transfers with RW and Rajni  Short Term Goal 3: pt to ambulate 150' with RW and CGAx1  Short Term Goal 4: pt to negotiate single platform step + 2 successive steps (No rails) with Rajni x1 to allow for safe home access  Short Term Goal 5: pt to verbally identify 3/3 spinal precautions to maximize safety and functional outcomes. Additional Goals?: Yes  Long Term Goals  Time Frame for Long Term Goals : Until D/C  Long Term Goal 1: pt to demo all Bed mobility on flattened bed with supervision  Long Term Goal 2: pt to perform transfers with RW and supervision  Long Term Goal 3: pt to ambulate 150' with RW and SBAx1  Long Term Goal 4: pt to negotiate single platform step + 2 successive steps (No rails) with SBA to allow for safe home access  Long Term Goal 5: pt to improve BLE strength by 1/2 MMG to improve safety with mobility  Additional Goals?: Yes  Long term goal 6: pt to improve Sitting and standing balance to FAIR or better to decrease risk for falls during mobility  Long term goal 7: pt to demo simulated or actual car transfer with SBA  Long term goal 8: pt to negotiate FF of steps with Bilateral Rail and SBA to allow for safe access to basement level of home. PLAN OF CARE  Frequency: 1-2 treatment sessions per day, 5-7 days per week  Safety Devices  Type of Devices: Call light within reach;Gait belt;Left in chair;Chair alarm in place; Telesitter in use (spouse with patient)    EDUCATION  Education  Education Given To: Patient; Family  Education Provided: Mobility Training; Safety  Education Method: Verbal  Barriers to Learning: Cognition  Education Outcome: Continued education needed         Therapy Time   12/30/22 0944   PT Individual Minutes   Time In 0902   Time Out 0944   Minutes Aracelis Vega PTA, 12/30/22 at 9:52 AM

## 2022-12-30 NOTE — PLAN OF CARE
Problem: Discharge Planning  Goal: Discharge to home or other facility with appropriate resources  12/30/2022 1048 by Lillie Wilson LPN  Outcome: Progressing     Problem: Skin/Tissue Integrity  Goal: Absence of new skin breakdown  Description: 1. Monitor for areas of redness and/or skin breakdown  2. Assess vascular access sites hourly  3. Every 4-6 hours minimum:  Change oxygen saturation probe site  4. Every 4-6 hours:  If on nasal continuous positive airway pressure, respiratory therapy assess nares and determine need for appliance change or resting period. 12/30/2022 1048 by Lillie Wilson LPN  Outcome: Progressing     Problem: Safety - Adult  Goal: Free from fall injury  12/30/2022 1048 by Lillie Wilson LPN  Outcome: Progressing     Problem: Pain  Goal: Verbalizes/displays adequate comfort level or baseline comfort level  12/30/2022 1048 by Lillie Wilson LPN  Outcome: Progressing     Problem: Confusion  Goal: Confusion, delirium, dementia, or psychosis is improved or at baseline  Description: INTERVENTIONS:  1. Assess for possible contributors to thought disturbance, including medications, impaired vision or hearing, underlying metabolic abnormalities, dehydration, psychiatric diagnoses, and notify attending LIP  2. [place] high risk fall precautions, as indicated  3. Provide frequent short contacts to provide reality reorientation, refocusing and direction  4. Decrease environmental stimuli, including noise as appropriate  5. Monitor and intervene to maintain adequate nutrition, hydration, elimination, sleep and activity  6. If unable to ensure safety without constant attention obtain sitter and review sitter guidelines with assigned personnel  7.  Initiate Psychosocial CNS and Spiritual Care consult, as indicated  12/30/2022 1048 by Lillie Wilson LPN  Outcome: Progressing     Problem: ABCDS Injury Assessment  Goal: Absence of physical injury  12/30/2022 1048 by Lillie Wilson LPN  Outcome: Progressing     Problem: Nutrition Deficit:  Goal: Optimize nutritional status  12/30/2022 1048 by Stewart Lang LPN  Outcome: Progressing

## 2022-12-30 NOTE — DISCHARGE SUMMARY
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  Parkinson's Disease  Depression  Impulsivity/agitation  Overactive bladder  Atrial fibrillation    Discharge Functional Status:    Physical Therapy:  Bed Mobility:   Bed mobility  Bridging: Modified independent   Rolling to Left: Modified independent  Rolling to Right: Modified independent  Supine to Sit: Modified independent  Sit to Supine: Modified independent  Scooting: Modified independent  Bed Mobility Comments: bed flat no handrail  Bed Mobility  Additional Factors: Head of bed raised, With handrails  Roll Left  Assistance Level: Stand by assist  Roll Right  Assistance Level: Supervision  Supine to Sit  Assistance Level: Supervision  Scooting  Assistance Level: Moderate assistance  Skilled Clinical Factors: to EOB      Transfers:   Transfers  Sit to Stand: Stand by assistance  Stand to Sit: Stand by assistance  Bed to Chair: Stand by assistance  Stand Pivot Transfers: Stand by assistance  Comment: occasional VCs safe hand placement sit <> stand  Transfers  Surface: From mat, Wheelchair, Standard toilet  Additional Factors: Hand placement cues, Increased time to complete, With handrails, Verbal cues, Set-up  Device: Walker (2WW)  Sit to Stand  Assistance Level: Contact guard assist  Skilled Clinical Factors: cues for hand placement;  Completed from mat table x4, from [facility] x1, from toilet x1  Stand to Sit  Assistance Level: Contact guard assist  Skilled Clinical Factors: cues for hand placement  Bed To/From Chair  Technique: Stand pivot  Assistance Level: Contact guard assist  Skilled Clinical Factors: MAX VC and tactile cuing for step by step technique and safe hand placement,  Stand Pivot  Assistance Level: Contact guard assist, Minimal assistance  Skilled Clinical Factors: Completed with 2WW WC to mat table, festinating steps; compelted WC to chair without AD needing Kyler to stabilize. Discussed using verbal cue of \"orange\" to break festinating      Mobility:   Ambulation  Surface: Level tile, Ramp  Device: Rolling Walker  Other Apparatus: Wheelchair follow (spouse assisting with follow)  Assistance: Stand by assistance, Contact guard assistance  Quality of Gait: heavy flexed at knees; freezing with turns  Gait Deviations: Decreased step length, Decreased step height, Shuffles, Slow Lesley  Distance: 207 feet x 2; 50 feet ramp  Comments: VCs to stop and resume during freezing episode turns > straight path and shuffeling steps  More Ambulation?: Yes  Ambulation 2  Surface - 2: level tile  Device 2: No device  Assistance 2: Contact guard assistance  Quality of Gait 2: knee flexion , freezing with turns  Gait Deviations: Increased CLARA  Distance: 120 feet x 2  Comments: good arm swing with increase lesley with increased step length  Ambulation  Surface: Level surface  Device: Rolling walker  Distance: 250ft x2  Activity: Within Unit  Additional Factors: Increased time to complete, Set-up, Verbal cues  Assistance Level: Contact guard assist, Minimal assistance (Kyler occasionally when starting to festinate)  Gait Deviations: Narrow base of support, Decreased heel strike right, Decreased heel strike left, Decreased step length bilateral  Skilled Clinical Factors: With 2WW pt is forward flexe. Increased walker height but still limited due to B knee flexion. Cues for slowing down but still empahsizing large steps. tactile cue for posture. Mild path deviations when too fast. Festinating with turning and aligning to sit to mat. Cued using VC \"orange\" to break with mild success. When pt self cues saying organge more successful.       Occupational Therapy: Grooming/Oral Hygiene  Assistance Level: Stand by assist  Skilled Clinical Factors: SBA/close SUP while standing at sink. Upper Extremity Bathing  Assistance Level: Stand by assist  Skilled Clinical Factors: SBA/close SUP while standing at sink. Lower Extremity Bathing  Equipment Provided: Long-handled sponge  Assistance Level: Stand by assist  Skilled Clinical Factors: SUP for washing BLEs while seated, SBA standing at GB to wash bolivar area and buttocks. Upper Extremity Dressing  Assistance Level: Set-up  Skilled Clinical Factors: Argentine and donning overhead tshirt without difficulty. Lower Extremity Dressing  Equipment Provided: Reachers  Assistance Level: Contact guard assist  Skilled Clinical Factors: Pt able to doff/ashley underwear and pants with increased time, CGA for orienting and CGA/SBA forthreading BLEs. Putting On/Taking Off Footwear  Equipment Provided: Foot funnel  Assistance Level: Minimal assistance  Skilled Clinical Factors: Pt. able to doff B socks without AE. Requested to not wear TEDs today. Pt. able to ashley shoes with elastic shoe laces- requiring Kyler for L heel in shoe. Toileting  Assistance Level: Stand by assist  Skilled Clinical Factors: SBA during clothing management before and after transferring to toilet. Pt. is able to complete toilet hygiene while seated by weight shifting (I). Toilet Transfers  Technique:  (ambulating with no device (uses RW mnost of the time))  Equipment: Standard toilet, Grab bars  Additional Factors: Verbal cues  Assistance Level: Stand by assist  Skilled Clinical Factors: Close SBA for safety.       Speech Therapy:  Comprehension: 5 - Patient understands basic needs (hungry/hot/pain)  Expression: 4 - Expresses basic ideas/needs 75-90%+ of the time  Social Interaction: 5 - Patient is appropriate with supervision/cues  Problem Solvin - Patient solves simple/routine tasks 25%-49%  Memory: 3 - Patient remembers 50%-74% of the time      Inpatient Rehabilitation Course:   Camila Jorgensen is a 79 y.o. male admitted to inpatient rehabilitation on 12/15/2022 for rehab for Lumbar spinal cord compression with paraparesis. INITIAL HPI:  He was originally admitted to [facility] for lumbar stenosis and weakness BLEs. Dr. Inocencio House performed L1-4 laminectomy and fusion for marked stenosis and degenerative spondylolisthesis on 12/7/22. Patient was exhibiting some post-op confusion. He has known history of Parkinson's Disease treated by Dr. Brina Ken. Patient evaluated today:  GEN: Well developed, well nourished, in NAD  HEENT:  NCAT. PERRL. EOMI. Mucous membranes pink and moist.   PULM:  Clear to ausculation. No rales or rhonchi. Respirations WNL and unlabored. CV:  tachycardic rate regular rhythm. No murmurs or gallops. GI:  Abdomen soft. Nontender. Non-distended. BS + and equal.    NEUROLOGICAL: A&O x3. Sensation intact to light touch. Resting tremor. Bradykinesia. Mild rigidity. Masked facies, hypophonia. MSK:  Functional ROM BUEs, impaired AROM BLEs due to weakness. Motor testing 5/5 key muscles BUEs, 4+/5 key muscles BLEs. Jana Bloodgood SKIN: Warm dry and intact. Good turgor. Posterior spine incision with staples  EXTREMITIES:  No calf tenderness to palpation. No edema BLEs. PSYCH: Mood WNL. Appropriately interactive. Affect WNL. Rehab course:   Patient's pain was controlled with prn Percocet and Tizanidine. He was seen by Dr. Inocencio House 12/22/22 and staples were removed at that time. He was maintained on his home doses of Sinemet IR and CR as well as Ongentys and his Parkinson's symptoms were stable during admission. He did develop some impulsivity and agitation with an episode of physical aggression. Psychiatry evaluated and initiated Seroquel at  and escitalopram on 12/24. He was maintained on a telesitter for the remainder of his admission. He had no further issues with agitation, impulsivity or aggression.  Pharmacy dosed his coumadin during admission. He was stable on alternating dose of 4 or 5 mg daily. After review with pharmacist he was discharged home on 4 mg dose daily with orders for weekly PT/INR with results to his cardiologist Dr. Denice Gonzalez for management. Chronic conditions were stable on home medications. The patient participated in an aggressive multidisciplinary inpatient rehabilitation program involving 3 hours per day, 5 days per week of rehabilitation. Patient benefited from inpatient rehab and was discharged in good stable condition. Consults:   Internal Medicine    Significant Diagnostics:   CBC:   Lab Results   Component Value Date/Time    WBC 5.2 12/30/2022 06:43 AM    RBC 3.66 12/30/2022 06:43 AM    HGB 12.1 12/30/2022 06:43 AM    HCT 36.3 12/30/2022 06:43 AM    MCV 99.2 12/30/2022 06:43 AM    MCH 33.2 12/30/2022 06:43 AM    MCHC 33.4 12/30/2022 06:43 AM    RDW 14.6 12/30/2022 06:43 AM     12/30/2022 06:43 AM    MPV 7.3 12/30/2022 06:43 AM     CMP:    Lab Results   Component Value Date/Time     12/30/2022 06:43 AM    K 4.0 12/30/2022 06:43 AM     12/30/2022 06:43 AM    CO2 24 12/30/2022 06:43 AM    BUN 22 12/30/2022 06:43 AM    CREATININE 0.71 12/30/2022 06:43 AM    LABGLOM >60 12/30/2022 06:43 AM    GLUCOSE 91 12/30/2022 06:43 AM    PROT 7.0 12/16/2022 06:21 AM    LABALBU 4.0 12/16/2022 06:21 AM    CALCIUM 8.8 12/30/2022 06:43 AM    BILITOT 0.7 12/16/2022 06:21 AM    ALKPHOS 96 12/16/2022 06:21 AM    AST 35 12/16/2022 06:21 AM    ALT 44 12/16/2022 06:21 AM         Patient Instructions:    LSO brace when out of bed.  No heavy lifting, twisting, bending    Medications, precautions and follow up reviewed with patient and family    Follow-up visits: See after visit summary from hospitalization: PCP 1-2 weeks, Dr. Christina Gonzalez as scheduled, Dr. Miguel A Carney as scheduled, Dr. Denice Gonzalez 1 week for coumadin management, PM&R 4-6 weeks    Disposition:  Home with outpatient therapy    Discharge Medications:         Medication List        START taking these medications      * carbidopa-levodopa  MG per tablet  Commonly known as: SINEMET  Take 1 tablet by mouth 2 times daily     * carbidopa-levodopa  MG per extended release tablet  Commonly known as: SINEMET CR  Take 1 tablet by mouth 2 times daily     diphenhydrAMINE-zinc acetate 2-0.1 % cream  Apply topically 3 times daily as needed. escitalopram 20 MG tablet  Commonly known as: LEXAPRO  Take 1 tablet by mouth daily     mirabegron 50 MG Tb24  Commonly known as: MYRBETRIQ  Take 50 mg by mouth nightly     Ongentys 50 MG Caps  Generic drug: Opicapone  Take 50 mg by mouth at bedtime     oxyCODONE-acetaminophen 5-325 MG per tablet  Commonly known as: PERCOCET  Take 2 tablets by mouth every 8 hours as needed for Pain for up to 7 days. Max Daily Amount: 6 tablets     polyethylene glycol 17 g packet  Commonly known as: GLYCOLAX  Take 17 g by mouth daily as needed for Constipation     QUEtiapine 25 MG tablet  Commonly known as: SEROQUEL  Take 1 tablet by mouth nightly     tiZANidine 2 MG tablet  Commonly known as: ZANAFLEX  Take 1 tablet by mouth every 8 hours as needed (insomnia)     warfarin 4 MG tablet  Commonly known as: Coumadin  Take 1 tablet by mouth daily           * This list has 2 medication(s) that are the same as other medications prescribed for you. Read the directions carefully, and ask your doctor or other care provider to review them with you.                    Where to Get Your Medications        These medications were sent to [facility] [facility] [address], [address], [address] [place] [address] [address] [address]      Phone: [phone]   escitalopram 20 MG tablet  oxyCODONE-acetaminophen 5-325 MG per tablet  QUEtiapine 25 MG tablet  tiZANidine 2 MG tablet  warfarin 4 MG tablet       You can get these medications from any pharmacy    You don't need a prescription for these medications  diphenhydrAMINE-zinc acetate 2-0.1 % cream  polyethylene glycol 17 g packet       Information about where to get these medications is not yet available    Ask your nurse or doctor about these medications  carbidopa-levodopa  MG per extended release tablet  carbidopa-levodopa  MG per tablet  mirabegron 50 MG Tb24  Ongentys 50 MG Caps            Discharge summary forwarded to PCP: Kartik Bowles MD     Time spent: 35 minutes    Diego Blackmon MD

## 2022-12-30 NOTE — PROGRESS NOTES
ACUTE INPATIENT [address]    Patient Name: Justo Baca  MRN: [id number]     Patient discharged in stable condition as per order of attending physician. AVS provided by nurse at time of discharge, which includes all necessary medical information pertaining to the patients current course of illness, treatment, medications, post-discharge goals of care, and treatment preferences. Provision of Current Reconciled Medication List to Patient at Discharge   Indicate the route(s) of transmission of the current reconciled medication list to the patient/family/caregiver. Paper Based (e.g. fax, copies, print outs)     Availability of \"My Chart\" offered to patient as a tool for updated health record. Steps for activation discussed with patient as mentioned on AVS.      Patient/responsible party verbalize understanding of discharge plan and are in agreement with goal/plan/treatment preferences. Belongings including Glasses, and all clothing  sent with patient/responsible party. Home medications sent home with patient/responsible party yes    Car Transfer   Level of assistance required for patient transfer into vehicle:   INDEPENDENT: Patient completes the activity by him/herself with no assistance from a helper     High-Risk Drug Classes: Use and Indication   Check if the patient is taking any medications by pharmacological classification If yes, check if there is an indication noted for all meds in the drug class   Antipsychotic No If yes: indication noted? [] If no indication noted, follow up with provider for order clarification   Anticoagulant Yes If yes: indication noted? []    Antibiotic No If yes: indication noted? []    Opioid No If yes: indication noted? []    Antiplatelet No If yes: indication noted? []    Hypoglycemic (Including Insulin) No If yes: indication noted?   []        Pain Assessment   Over the past 5 days, how much of the time has pain made it hard for you to sleep at night? Rarely or not at all   Over the past 5 days, how often have you limited your participation in rehabilitation therapy sessions due to pain? Rarely or not at all   Over the past 5 days, how often have you limited your day-to-day activities (excluding rehabilitation therapy session)? Rarely or not at all     Special Treatments, Procedures, and Programs   Check all of the following treatments, procedures, and programs that apply on admission.     Cancer Treatments   Chemotherapy No   If Yes, Check All That Apply   []IV Chemotherapy    []Oral Chemotherapy    [] Chemotherapy    Radiation No   Respiratory Therapies   Oxygen Therapy No  If Yes, Check All That Apply   []Continuous   []Intermittent    []High-Concentration    Suctioning No  If Yes, Check All That Apply   []Scheduled   []As Needed   Tracheostomy Care No   Invasive Mechanical Ventilator   (Ventilator or Respirator) No  If Yes, Check All That Apply   [] Non-invasive Mechanical Ventilator   []BiPAP   []CPAP   Other   IV Medications No  If Yes, Check All That Apply   [] IV Vasoactive Medications   []IV Antibiotics   []IV Anticoagulation   [] IV Medications   Transfusions No   Dialysis No  If Yes, Check All That Apply   []Hemodialysis   [] Dialysis   IV Access No  If Yes, Check All That Apply   []Peripheral   []Midline   [] (PICC, tunneled, port)

## 2022-12-30 NOTE — CARE COORDINATION
ARU CASE MANAGEMENT DISCHARGE NOTE    Patient discharged today to: Home with     Transportation per: Car    HHC: Declined    OP Therapy: [facility] Location. DME: Ivone Razo to be delivered here by [facility]    Current reconciled medication list electronically sent:to the subsequent provider: Rufina De Oliveira MD    Confirmation Received: [address]    Case Management Assessment: IRF EDWAR 4.0   If score is above 15, Notify PM&R Physician    Patient Health Questionnaire-9 (PHQ-2 to 9)   Over the last 2 weeks, how often have you been bothered by any of the following problems? 1. Little Interest or pleasure in doing things? Never or 1 Day - Score 0    2. Feeling down, depressed or hopeless? Never or 1 Day - Score 0    3. Trouble falling or staying asleep, or sleeping too much? Never or 1 Day - Score 0    4. Feeling tired or having little energy? Never or 1 Day - Score 0    5. Poor apettite or overeating? Never or 1 Day - Score 0    6. Feeling bad about yourself-or that you are a failure or have let yourself or your family down? Never or 1 Day - Score 0    7. Trouble concentrating on things, such as reading the newspaper or watching television? Never or 1 Day - Score 0    8. Moving or speaking so slowly that other people could have noticed? Or the opposite-being so fidgety or restless that you have been moving around a lot more than usual?   Never or 1 Day - Score 0    9. Thoughts that you would be better off dead or of hurting yourself in some way? Never or 1 Day - Score 0    Total Score0    If you checked off any problems, how difficult have these problems made it for you to do your work, take care of things at home, or get along with other people?    [] Not difficult at all     [x] Somewhat Difficult     [] Very Difficult     [] Extremely Difficult           Social Isolation     How often do you feel lonely or isolated from those around you? [x] Never  [] Rarely  [] Sometimes  [] Often  [] Always  [] Patient Unable to Respond       Access To Transportation     2. In the past six months to a year, has lack of transportation kept you from medical appointments or from getting your medications?  No    3. In the past six months to a year, has lack of transportation kept you from non-medical meetings, appointments, work, or from getting things that you need?  No

## 2022-12-30 NOTE — DISCHARGE INSTR - DIET
Good nutrition is important when healing from an illness, injury, or surgery. Follow any nutrition recommendations given to you during your hospital stay. If you were given an oral nutrition supplement while in the hospital, continue to take this supplement at home. You can take it with meals, in-between meals, and/or before bedtime. These supplements can be purchased at most local grocery stores, pharmacies, and chain [employer]-stores. If you have any questions about your diet or nutrition, call the hospital and ask for the dietitian.           Home on regular diet

## 2022-12-30 NOTE — PROGRESS NOTES
[address]    Acute Rehabilitation Occupational Therapy Daily Treatment Note    Date: 22  Patient Name: Fabian Sharma       Room: 8290/7999-14  MRN: [id number]  Account: [de-identified]   : 1955  (78 y.o.) Gender: male       Referring Practitioner: Krissy Mccracken MD  Diagnosis: Cord compression s/p surgical decompression/fusion L1-4  Additional Pertinent Hx: Patient with spinal cord compression who had L1-4 laminectomy and fusion performed by Dr. Cornelius Watt formarked L1-2 stenosis and degenerative spondylolisthesis at L3-4 on 22. Medical records indicate some post-op confusion. Pt admitted to ARU on 12/15/22. Treatment Diagnosis: Impaired self care status    Past Medical History:  has a past medical history of Anxiety, Asthma, Atrial fibrillation ([address] Back pain, BPH (benign prostatic hyperplasia), Dental disease, Depression, Diarrhea, MALONE (dyspnea on exertion), Frequent falls, GERD (gastroesophageal reflux disease), Memory loss, Obesity, Panic disorder, Parkinson's disease ([address] Testicular cancer ([address] and Visual impairment. Past Surgical History:   has a past surgical history that includes Cholecystectomy; hernia repair; Total knee arthroplasty (Bilateral); lumbar laminectomy; Orchiectomy; brow lift; Carpal tunnel release (Right); Shoulder arthroscopy (Right); Wrist surgery (Left); Colonoscopy; Cystoscopy; brain surgery; TURP; and lymph node dissection. Restrictions  Restrictions/Precautions  Restrictions/Precautions: Surgical Protocols, Fall Risk, General Precautions, Up as Tolerated  Required Braces or Orthoses?: Yes  Implants present? : Metal implants, Deep brain stimulator  Required Braces or Orthoses  Spinal: Lumbar Corset  Position Activity Restriction  Spinal Precautions: Avoid Excessive Bending, Lifting, and Twisting of spine  Other position/activity restrictions: LSO to be donned when OOB; Fred Valdez per Dr. Keith Pearce to shower POD #7 (Sx date: 22).  Order clarified on 12/20/22- Per Dr. Blanco Rivera use back brace for comfort. It is okay to remove to shower. Vitals        Subjective  Subjective  Subjective: AM: Pt. in bed upon arrival. Awake. Ready to start the day. Pt. excited to discharge to home this afternoon. Pain: No c/o any pain at this time. Objective  Cognition  Overall Orientation Status: Within Functional Limits  Orientation Level: Oriented X4    Cognition  Overall Cognitive Status: Exceptions  Arousal/Alertness: Delayed responses to stimuli  Following Commands: Follows one step commands with increased time; Follows one step commands with repetition  Attention Span: Difficulty attending to directions  Memory: Decreased short term memory;Decreased recall of precautions;Decreased recall of recent events  Safety Judgement: Decreased awareness of need for assistance;Decreased awareness of need for safety  Problem Solving: Assistance required to identify errors made;Assistance required to correct errors made;Assistance required to implement solutions;Assistance required to generate solutions;Decreased awareness of errors  Insights: Decreased awareness of deficits  Initiation: Requires cues for some  Sequencing: Requires cues for some  Cognition Comment: Pt's Wife Juany Escobar states that pt's cognition and memory is near baseline; Hx of parkinson's disease Dx'd 10-15 years ago    Activities of Daily Living  Feeding  Assistance Level: Modified independent  Skilled Clinical Factors: per spouse and pt report    Grooming/Oral Hygiene  Assistance Level: Stand by assist  Skilled Clinical Factors: SBA/close SUP while standing at sink. Upper Extremity Bathing  Assistance Level: Stand by assist  Skilled Clinical Factors: SBA/close SUP while standing at sink. Upper Extremity Dressing  Assistance Level: Set-up  Skilled Clinical Factors: [place] and donning overhead tshirt without difficulty.     Lower Extremity Dressing  Assistance Level: Contact guard assist  Skilled Clinical Factors: Pt able to doff/ashley underwear and pants with increased time, CGA for orienting and CGA/SBA forthreading BLEs. Putting On/Taking Off Footwear  Assistance Level: Minimal assistance  Skilled Clinical Factors: Pt. able to doff B socks without AE. Requested to not wear TEDs today. Pt. able to ashley shoes with elastic shoe laces- requiring Kyler for L heel in shoe. Toileting  Assistance Level: Stand by assist  Skilled Clinical Factors: SBA during clothing management before and after transferring to toilet. Pt. is able to complete toilet hygiene while seated by weight shifting (I). Toilet Transfers  Assistance Level: Stand by assist  Skilled Clinical Factors: Close SBA for safety. Mobility     Supine to Sit  Assistance Level: Supervision  Skilled Clinical Factors: Head of bed elevated, and use of bed rail. Scooting  Assistance Level: Supervision  Skilled Clinical Factors: Hips closer to EOB. Transfers  Surface: From bed;Standard toilet; To chair with arms  Device: Walker  Sit to Stand  Assistance Level: Stand by assist  Skilled Clinical Factors: Occasional verbal cues for proper hand placement. Stand to Sit  Assistance Level: Stand by assist  Skilled Clinical Factors: Demonstrated controlled descend without cuing this date. Functional Mobility  Device: Rolling walker  Activity: To/From bathroom  Assistance Level: Stand by assist  Skilled Clinical Factors: Verbal cuing for activity pacing. Assessment  Assessment  Activity Tolerance: Patient tolerated treatment well  Discharge Recommendations: 24 hour supervision or assist;Home with Home health OT    Patient Education  Education  Education Given To: Patient; Family  Education Provided: Role of Therapy;Plan of Care;ADL Function; Fall Prevention Strategies; Equipment; Mobility Training;Transfer Training;Energy Conservation; Safety  Education Method: Verbal;Demonstration; Teach Back  Barriers to Learning: Cognition  Education Outcome: Verbalized understanding;Demonstrated understanding;Continued education needed  Skilled Clinical Factors: Pt's wife verbalizes that she feels more than comfortable having pt discharge to home- no concerns or worries. She says he is much stronger than last time he was home. OT Equipment Recommendations  Other: Pt and pt spouse reports having walk in shower, shower bench, and grab bars in shower and around commode at private residence. That RW will fit into bathroom. Safety Devices  Safety Devices in place: Yes  Type of devices: All fall risk precautions in place     Goals  Patient Goals   Patient goals : \"I would like to be able to get up and go so I can go outside and do some repairs and go downstairs and reload some guerrero. Do the things I normally do. \"  Short Term Goals  Time Frame for Short Term Goals: By 1 week  Short Term Goal 1: Pt will complete upper body dressing/bathing with Min A and Good attention/safety while maintaining back precautions  Short Term Goal 2: Pt will complete lower body dressing/bathing with Mod A and Good safety with use of AE as needed while maintaining back precautions  Short Term Goal 3: Pt will complete funcitonal transfers/mobility during self care tasks with Min A and Good safety with use of least restrictive device  Short Term Goal 4: Pt will tolerate standing 4+ minutes during self care tasks with Min A and Good safety  Short Term Goal 5: Pt will verbalize/demonstrate understanding of back precautions during daily activities 80% accuracy  Short Term Goal 6: Pt will participate in 30+ minutes during therapeutic exercises/functional activities to increase safety and independence with self care and mobility    Long Term Goals  Time Frame for Long Term Goals : By discharge  Long Term Goal 1: Pt will complete BADLs with SBA and Good attention/safety to task while maintaining back precautions  Long Term Goal 2: Pt will complete functional transfers/mobility during self care taks with SBA and Good safety with use of least restrictive device  Long Term Goal 3: Pt will tolerate standing 8+ minutes during self care tasks with SBA and Good safety  Long Term Goal 4: Pt will demonstrate increased [address] and strength during self care tasks as evident by 5# improvement in  strength and 15 second improvement on 9 hole peg test.  Long Term Goal 5: Pt will demonstrate sitting EOB 20+ minutes during self care tasks while maintaining appropriate midline with SBA  Long Term Goal 6: Pt/family will verbalize/demonstrate of home safety/fall prevention strategies to increase safety and independence with self care and mobility    Plan  Occupational Therapy Plan  Times Per Week: 5-7  Times Per Day: Twice a day  Current Treatment Recommendations: Self-Care / ADL, Strengthening, ROM, Balance training, Functional mobility training, Endurance training, Cognitive reorientation, Pain management, Safety education & training, Patient/Caregiver education & training, Equipment evaluation, education, & procurement, Home management training, Coordination training, Cognitive/Perceptual training         12/30/22 0749   OT Individual Minutes   Time In 0800   Time Out [phone]   Minutes 53        Electronically signed by KIM Devi on 12/30/22 at 3:10 PM EST

## 2023-01-09 ENCOUNTER — HOSPITAL ENCOUNTER (OUTPATIENT)
Dept: PHYSICAL THERAPY | Age: 68
Setting detail: THERAPIES SERIES
Discharge: HOME | End: 2023-01-09
Payer: MEDICARE

## 2023-01-09 PROCEDURE — 97163 PT EVAL HIGH COMPLEX 45 MIN: CPT

## 2023-01-09 PROCEDURE — 97530 THERAPEUTIC ACTIVITIES: CPT

## 2023-01-09 NOTE — THERAPY EVALUATION
[x] [facility]) - [facility] & Therapy  [address] [facility]  [place]: [phone]   F: [phone]        Physical Therapy Neurological Evaluation    Date:  2023  Patient: Girish Parrish  : 1955  MRN: [id number]  Physician: Shivani Latham MD   Insurance: [address] [place]   Medical Diagnosis:   G95.20 (ICD-10-CM) - Cord compression ([facility] [address]   G20 (ICD-10-CM) - Parkinson disease ([facility] [address]      Rehab Codes: Z74.0 reduced mobility, R26.89 gait abnormality, R 29.3 abnormal posture, R27.8 lack of coordination   Date of symptom onset: 22 -DOS; Parkinson's 15+ years   Next 's appt. : 23 - Dr Lynette Arreola ; Dr. Milo Solorio 22    Precautions: LSO to be worn when upright, PD with cognitive deficits, impulsive, HIGH FALL RISK     Subjective:   Pt arrives to clinic ambulatory with 2WW & wearing LSO. Pt is accompanied by his wife. Pt is agreeable to PT evaluation     Pt was recently admitted to ARU 12/15/22-22. He was originally admitted to [facility] for lumbar stenosis and weakness BLEs. Dr. Yas Mayfield performed L1-4 laminectomy and fusion for marked stenosis and degenerative spondylolisthesis on 22. Had a prior lumbar surgery 10 years ago. Patient was exhibiting some post-op confusion and limited mobility warranting referral to ARU. He has known history of Parkinson's Disease treated by Dr. Marguerite Feldman. Has been treated for PD for the last 15 years -- on a carbidopa/levodopa regimen. Taking AM & PM -- then takes them as needed. Wife notes that his speech is affected with doses every 4 hours so she does not give it to him Pt and wife are noticing freezing of gait and R sided weakness related to PD. Prior to surgery pt was using a 2WW and only going short distances. Does get freezing of gait with doorways consistently. Since being out of the hospital, the most difficult thing is getting the LSO on and in a good spot.  Denies any difficulty with getting in/out of bed & transfers.  Pt has an airdyne bike at home about 5x/wk prior to going to hospital.     Falls history: denies any recent falls      Patient stated Goals: to improve movement, to continue carryover the gains from the hospital, to get back to using rollator     Pain:  [] Yes  [x] No   Location: denies   Pain Rating: (0-10 scale) denies/10  Pain descriptors: --       Previous PT hx: ARU 12/15/22 - 12/30/22       PMHx: [] Unremarkable [] Diabetes [] HTN  [] Pacemaker   [] MI/Heart Problems [] Cancer [] Arthritis [x] Other: PARKINSON's DISEASE              [x] Refer to full medical chart  In EPIC   Past Medical History:   Diagnosis Date    Anxiety     Asthma     Atrial fibrillation (HCC)     Back pain     BPH (benign prostatic hyperplasia)     Dental disease     Depression     Diarrhea     MALONE (dyspnea on exertion)     Frequent falls     GERD (gastroesophageal reflux disease)     Memory loss     Obesity     Panic disorder     Parkinson's disease ([address]     Testicular cancer ([facility] [address]     Visual impairment       Past Surgical History:   Procedure Laterality Date    BRAIN SURGERY      Neurostimulator implant for Parkinson's    BROW LIFT      CARPAL TUNNEL RELEASE Right     CHOLECYSTECTOMY      COLONOSCOPY      CYSTOSCOPY      HERNIA REPAIR      LUMBAR LAMINECTOMY      LYMPH NODE DISSECTION      ORCHIECTOMY      SHOULDER ARTHROSCOPY Right     TOTAL KNEE ARTHROPLASTY Bilateral     TURP      WRIST SURGERY Left     cyst removal        Comorbidities:   [] Obesity [] Dialysis  [x] Other: PD -- deep brain stimulator   [] Asthma/COPD [] Dementia [x] Other: bilat TKA    [] Stroke [] Sleep apnea [] Other:   [] Vascular disease [] Rheumatic disease [] Other:         Medications: [x] Refer to full medical record [] None [] Other:  Allergies:       [x] Refer to full medical record [] None [] Other:  Preferred Language:   [x] [place]           [] Other:    Function:  Hand Dominance  [x] Right  [] Left    Home Environment:   Patient lives with: Wife     In what type of home [x]  One story with basement (bike and computer here)  [] Two story   [] Split level   Number of stairs to enter  1+2 with no HR   First step is about 8-9 in; holds on to storm door             Stairs within the home   13          Handrails: [x]  Right to ascending  [x] Left to ascending   Bathroom has a []  Tub only  [] Tub/shower combo   [x] Walk in shower    []  Grab bars        with a seat    Employer/Job  retired   Hobbies  Wants to get back to [employer] and [address] [facility]:  Current DME available: 2WW, rollator (broken breaks)    Prior Level of Function: using a 2WW or rollator at a household level, Would occasionally need assistance based on his tremor.       ADL/IADL Previous level of function Current level of function Who currently assists the patient with task/Comments   Bathing  [] Independent  [x] Assist [] Independent  [x] Assist Wife assists as needed   Using a shower chair    Dress/grooming [x] Independent  [] Assist [] Independent  [x] Assist Occasional assist needed   Transfer/ bed mobility [x] Independent  [] Assist [] Independent  [x] Assist Reports being IND but occasional assist to stabilize    Feeding [x] Independent  [] Assist [x] Independent  [] Assist    Toileting [x] Independent  [] Assist [x] Independent  [] Assist With UE support    Driving [] Independent  [x] Assist [] Independent  [x] Assist Wife    Housekeeping [] Independent  [x] Assist [] Independent  [x] Assist Wife is primary    Grocery shop/meal prep [] Independent  [x] Assist [] Independent  [x] Assist Wife    Ambulation [] Independent  [x] Assist  [] Independent  [x] Assist  Used rollator prior; 2WW now        Objective:    POSTURE No deficit Deficit Not Tested Comments   Kyphosis [] [x] [] Significant    Scoliosis [x] [] []    Forward Head [] [x] []    Rounded Shldrs [] [x] []    Slumped Sitting [] [x] []    Skin Integrity [x] [] [] Scapular/trunk alignment   x  Forward flexed in sitting and standing    Pelvic alignment x      Hip alignment x      Knee alignment  x  Bilateral knee flexion in standing    Ankle alignment x             NEUROLOGICAL       Reflexes [x] [] [] Negative hoffmans    Sensation [] [x] [] Decreased light touch bilat in feet   Extinction sense = fair    Bladder/Bowel [x] [] []    Coordination [] [x] [] Finger to nose: R = 12s // L = 5s  Dysmetria noted   Heel to shin: equal bilat  JIMMIE- UE: difficulty going 10 times   Hypokinesia with reps  UE & LE   Tone [x] [] []    Clonus [x] [] []    Vision   x  Wears glasses    Hearing        Cognition  x  Soft speech   Mild difficulty following directions   Mild decreased memory recall    Tremors [] [x] [] Involuntary head movements, R UE ataxia with movement        General Observations:          Range of Motion  Left Range of Motion  Right Strength  Left Strength  Right   Hip Flexion   4+ 4+   Hip Abduction     4 4   Hip Adduction     5 5   Hip Extension     4 4   Knee Flexion     5 5   Knee Extension     5 5   Dorsiflexion     5 5   Plantar flexion     4 4   - tight hamstrings limiting bilat Knee extension in standing     FUNCTION Level of assistance Comments/observations   Bed Mobility  Will assess in later sessions    Transfers     -sit to stand Kyler Need for UE support, immediate unsteadiness with standing needing Kyler to stabilize, retropulsive, does not get full knee extension    -sliding board     -pivot Kyler  Festinating with turning    Balance     Sitting  IND     Standing  Kyler until stabilized then CGA  Standing with bilateral knee extension    Balance reactions  Decreased     Single Limb stance  Unable     Ambulation Kyler  With 2WW   *festination with turning    Stairs  Will assess at later session       Functional outcome measures:     Functional Patient Reported Outcome Assessment Used:  Self Assessment of PD Disability Scale   Current Status Score: 34 /115  Goal Status Score: < 26/ 115     Outcome Measures  1/9/23     DORAN 22/56    5xSTS 27 seconds- with UE support     10mWT     6 minute walk test      TUG  34 seconds with 2WW and Kyler for balance    FGA     MiniBEST              Assessment:    Pt presents with decreased balance and mobility related to being post-op lumbar surgery and PD. LSO remains on for all of evaluation. Discussed getting clearance for this at next follow up. At this time pt is no longer having low back pain. His deficits appear to be more correlated to his Parkinson's disease. Pt has signs consistent with PD including freezing of gait, rigid posture, involuntary head movements, hypokinesia, bradykinesia, soft speech, decreased coordination of the RUE, general weakness, and stooped posture. Noting some mid to late stage parkinson's symptoms of decreased cognition and memory. Pt tends to be impulsive and have limited insight into deficits. He has imbalance with initial standing as tends to retropulse creating instability. He requires Kyler for most mobility due to these deficits. With all outcome measures supports he is at increased fall risk. He tends to festinate significantly with turning and has decreased awareness of the RLE having difficulty moving which leads to near [address] [facility] During eval needing cues to attend to trunk position vs foot position to not have a LOB forward. Will want to work on increasing mobility and getting larger movement to allow for safe and more IND mobility at a home level. Potential barriers to progressing will be chronicity of having PD, decreased insight, limited cognition, but will work to try to mitigate these barriers. Recommend that this patient have constant close supervision to reduce fall risk and should be using the 2WW for limited mobility with assistance.  Overall feel this Patient would benefit from skilled physical therapy services in order to: improve balance, increased postural alignment, improve motor planning and getting larger movements, and improve gait especially with turning to allow for safe and more IND mobility in the home. Problems:    [] ? Pain:  [] ? ROM:  [x] ? Strength:  [x] ? Function:  [x] Other:  HIGH FALL RISK; decreased coordination, impaired cognition. STG: (to be met in 10 treatments)  Pt will be able to ambulate with 2WW at gait speed of > 0.6m/s to demonstrate a good home ambulation gait speed to increase mobility in the home. Pt will be able to consistently stand with bilateral knees extended with 2WW to improve standing alignment. Pt will be able to complete STS with supervision using AS and not needing assist to stabilize to improve safety with transfers in the home. Pt will consistent be able to turn to align with chair minimal freezing and ability to self correct to be mor efficient with transfers. Pt will improve score on DORAN balance scale by >/=6 points (>28 pt) to improve overall balance stability and decrease fall risk with mobility. Pt will improve time on 5xSTS to <20 seconds with use of RUE and consistently coming to full stand to decrease fall risk and increase functional capacity for mobility. LTG: (to be met in 20 treatments)  Pt will be able to ambulate with a rollator for > 500ft and around obstacles with supervision to maximize mobility for around the home. Pt will improve time on TUG to < 20 seconds with LRAD to be able to demonstrate decreased fall risk with home mobility. Pt will improve score on Self-assessment of PD Disability scale to < 26/115 to demonstrate improvement in functional ADL tasks that are meaningful. Pt will be independent with home program addressing strength, flexibility and balance to maximize gains made in therapy to improve overall functional capacity for mobility. Pt will report no falls for x6 weeks demonstrating improved safety with mobility and implementation of fall prevention strategies.           Rehab Potential: [] Good  [x] Fair  [] Poor   Suggested Professional Referral:  [x] No  [] Yes:  Barriers to Goal Achievement[de-identified]  [] No  [x] Yes: chronic PD, limited compliance with medication; decreased cognition/insight   Domestic Concerns:  [x] No  [] Yes:    Pt. Education:  [x] Plans/Goals, Risks/Benefits discussed  [] Home exercise program  Method of Education: [x] Verbal  [x] Demo  [] Written  Comprehension of Education:  [x] Verbalizes understanding. [x] Demonstrates understanding. [] Needs Review. [] Demonstrates/verbalizes understanding of HEP/Ed previously given. ** will determine best exercises for HEP at later visits**     Treatment Plan:  [x] Therapeutic Exercise   81162  [] Iontophoresis: 4 mg/mL Dexamethasone Sodium Phosphate  mAmin  75105   [x] Therapeutic Activity  58164 [] Vasopneumatic cold with compression  96908    [x] Gait Training   14001 [] Ultrasound   88150   [x] Neuromuscular Re-education  79393 [] Electrical Stimulation Unattended  53608   [] Manual Therapy  56709 [] Electrical Stimulation Attended  91549   [x] Instruction in HEP  [] Dry Needling   [] Aquatic Therapy   51984 [] Cold/hotpack    [x] Wheelchair Training   92803 [x] PT re-evaluation (if needed)   [phone]     []  Medication allergies reviewed for use of    Dexamethasone Sodium Phosphate 4mg/ml     with iontophoresis treatments. Pt is not allergic. Frequency: 2 x/weeks for 20 visits    Todays Treatment:  - educated patient and wife on fall risk. Discussed needing Kyler for mobility and that pt should not be alone   - educated on how 2WW most appropriate AD given imbalance and need for greater stability with rollator.   - Discussed positioning of LSO and needing to communicate with MD about when it needs worn     Other:    Specific Instructions for next treatment: HS stretching, standing postural stretching against wall, over exaggerated movements, working on using a VC to break freezing.       Evaluation Complexity:  History (Personal factors, comorbidities) [] 0 [] 1-2 [x] 3+   Exam (limitations, restrictions) [] 1-2 [] 3 [x] 4+   Clinical presentation (progression) [] Stable [] Evolving  [] Unstable   Decision Making [] Low [] Moderate [x] High    [] Low Complexity [] Moderate Complexity [x] High Complexity       Treatment Charges: Mins Units   [x] Evaluation       []  Low       []  Moderate       [x]  High 50 1   []  Modalities     []  Ther Exercise     []  Manual Therapy     [x]  Ther Activities 8 1   []  Gait Training      []  Vasocompression     []  Other       TOTAL TREATMENT TIME: 58      (Billed timed treatment: 8)     Time In: 4476    Time Out: 1:45     Electronically signed by:   Gavi Perez, PT, DPT   #[id number]

## 2023-01-13 ENCOUNTER — HOSPITAL ENCOUNTER (OUTPATIENT)
Dept: PHYSICAL THERAPY | Age: 68
Setting detail: THERAPIES SERIES
Discharge: HOME | End: 2023-01-13
Payer: MEDICARE

## 2023-01-13 PROCEDURE — 97116 GAIT TRAINING THERAPY: CPT

## 2023-01-13 PROCEDURE — 97112 NEUROMUSCULAR REEDUCATION: CPT

## 2023-01-13 NOTE — FLOWSHEET NOTE
[address] [facility]   [facility] [address]   Phone: [phone]   Fax: [phone]    Physical Therapy Daily Treatment Note      Date:  2023  Patient Name:  Ced Rebolledo    :  1955  MRN: [id number]  Physician: Sujatha Quintana MD                               Insurance: [address] [place]   Medical Diagnosis:   G95.20 (ICD-10-CM) - Cord compression ([facility] [address]   G20 (ICD-10-CM) - Parkinson disease ([facility] [address]                 Rehab Codes: Z74.0 reduced mobility, R26.89 gait abnormality, R 29.3 abnormal posture, R27.8 lack of coordination   Date of symptom onset: 22 -DOS; Parkinson's 15+ years   Next 's appt. : 23 - Dr Dalia Quesada ; Dr. Baird [place] 22  Visit# / total visits:   Cancels/No Shows: 0/0    Precautions: LSO to be worn when upright, PD with cognitive deficits, impulsive, HIGH FALL RISK     Subjective:    Pain:  [x] Yes  [] No Location: Low back   Pain Rating: (0-10 scale) 4-5/10  Pain altered Tx:  [x] No  [] Yes  Action:  Comments: Patient reports feeling stiff with some pain in the low back. Denies any falls since initial eval.     Objective:  INTERVENTIONS  INTERVENTIONS  Reps/ Time Weight/ Level Completed  Today Comments          EXERCISES        Sitting:       HS stretch on step 3x30'' ea  x Assist given to keep foot in DF and knee extended   STS 5x  x BUE for push off chair, No UE support while standing   PWR step in seated 10x ea  x Blue dot to march laterally, purple dot for midline. Standing:       Postural stretching at wall 1'  x Used wall for upright posture, tactile cueing at distal quads   OH lifts in front of chair 10x 4# ball x    OH lifts while at wall 10x 4# ball x    Cone from [facility] > to [place] hand off to opp UE > to table 8x ea  x No UE support, tactile cueing at distal quads.  Very fatigued with last set   High knee marches 5x ea  x CGA, but required Mod-MaxA for  3x LOB correction Gait:       Cone retrieval 6 cones x2  x    Cone weaves 6 cones  x    Retro Walking with rollator 20ft x2  x    Other:    Specific Instructions for next treatment HS stretching, standing postural stretching against wall, over exaggerated movements, working on using a VC to break freezing. Assessment: [] Progressing toward goals. [] No change. [x] Other: 1/13: First session after initial evaluation. Began session with seated HS stretch followed by STS and standing activities focused on upright posture and large movements. Patient required tactile cueing to improve knee extension. Occasional reminders given for task initiation with OH cone passes. Patient quickly fatigued with standing activities and unable to remain in upright posture. Seated rest breaks given throughout session due to fatigue. Ambulated in anand with cone retrievals from various heights, with most difficulty being from floor with maximal cueing to not bend trunk excessively and focus on using BLE to squat to pick cones up. Completed cone weaves to challenge balance with navigating rollator. Patient had most difficulty with weaving to the L and would step on the rear rollator wheel with L LE. Due to fatigue, returned to seated exercises and added PWR step with dots for visual cueing of foot placement. Patient struggled with R LE control and had difficulty getting correct foot placement on dots. Discussed safety with STS as pt demonstrated impulsiveness and decreased awareness throughout entire session. [x] Patient would continue to benefit from skilled physical therapy services in order to: improve balance, increased postural alignment, improve motor planning and getting larger movements, and improve gait especially with turning to allow for safe and more IND mobility in the home.      GOALS:      STG: (to be met in 10 treatments)  Pt will be able to ambulate with 2WW at gait speed of > 0.6m/s to demonstrate a good home ambulation gait speed to increase mobility in the home. Pt will be able to consistently stand with bilateral knees extended with 2WW to improve standing alignment. Pt will be able to complete STS with supervision using AS and not needing assist to stabilize to improve safety with transfers in the home. Pt will consistent be able to turn to align with chair minimal freezing and ability to self correct to be mor efficient with transfers. Pt will improve score on DORAN balance scale by >/=6 points (>28 pt) to improve overall balance stability and decrease fall risk with mobility. Pt will improve time on 5xSTS to <20 seconds with use of RUE and consistently coming to full stand to decrease fall risk and increase functional capacity for mobility. LTG: (to be met in 20 treatments)  Pt will be able to ambulate with a rollator for > 500ft and around obstacles with supervision to maximize mobility for around the home. Pt will improve time on TUG to < 20 seconds with LRAD to be able to demonstrate decreased fall risk with home mobility. Pt will improve score on Self-assessment of PD Disability scale to < 26/115 to demonstrate improvement in functional ADL tasks that are meaningful. Pt will be independent with home program addressing strength, flexibility and balance to maximize gains made in therapy to improve overall functional capacity for mobility. Pt will report no falls for x6 weeks demonstrating improved safety with mobility and implementation of fall prevention strategies. Pt. Education:  [x] Yes  [] No  [] Reviewed Prior HEP/Ed  Method of Education: [x] Verbal  [x] Demo  [] Written  Edu: Posture, knee extension in standing, awareness with STS safety  Comprehension of Education:  [] Verbalizes understanding. [] Demonstrates understanding. [x] Needs review. [] Demonstrates/verbalizes HEP/Ed previously given. Plan: [x] Continue per plan of care.    [] Other:      Treatment Charges: Mins Units []  Modalities     []  Ther Exercise     []  Manual Therapy     []  Ther Activities     []  Aquatics     [x]  Neuromuscular 40 3   [] Vasocompression     [x] Gait Training 20 1   [] Dry needling        [] 1 or 2 muscles        [] 3 or more muscles     []  Other     Total Treatment time 60 4     Time In:1415            Time Out: 1045    Electronically signed by:  Charly Bush PTA

## 2023-01-18 ENCOUNTER — HOSPITAL ENCOUNTER (OUTPATIENT)
Dept: PHYSICAL THERAPY | Age: 68
Setting detail: THERAPIES SERIES
Discharge: HOME | End: 2023-01-18
Payer: MEDICARE

## 2023-01-18 PROCEDURE — 97112 NEUROMUSCULAR REEDUCATION: CPT

## 2023-01-18 PROCEDURE — 97116 GAIT TRAINING THERAPY: CPT

## 2023-01-18 NOTE — FLOWSHEET NOTE
[facility] Outpatient Physical Therapy   [address]   Phone: [phone]   Fax: [phone]    Physical Therapy Daily Treatment Note      Date:  2023  Patient Name:  Dalila Bird    :  1955  MRN: [id number]  Physician: Saravanan Bethea MD                               [place]urance: [address] [place]   Medical Diagnosis:   G95.20 (ICD-10-CM) - Cord compression ([facility] [address]   G20 (ICD-10-CM) - Parkinson disease ([facility] [address]                 Rehab Codes: Z74.0 reduced mobility, R26.89 gait abnormality, R 29.3 abnormal posture, R27.8 lack of coordination   Date of symptom onset: 22 -DOS; Parkinson's 15+ years   Next 's appt. : 23 - Dr Kalli Peters ; Dr. Merle Moreira 22  Visit# / total visits: 3/20  Cancels/No Shows: 0/0    Precautions: LSO to be worn when upright, PD with cognitive deficits, impulsive, HIGH FALL RISK     Subjective:    Pain:  [x] Yes  [] No Location: Low back   Pain Rating: (0-10 scale) 4/10  Pain altered Tx:  [x] No  [] Yes  Action:  Comments: Patient reports feeling fatigued after last visit but denies any other changes. Objective:  INTERVENTIONS  INTERVENTIONS  Reps/ Time Weight/ Level Completed  Today Comments          EXERCISES        Sitting:       HS stretch on step 3x30'' ea  x Assist given to keep foot in DF and knee extended   STS 3x5  x 1st set with BUE push off chair, 2nd set hands on thighs, 3rd set arms crossed at chest to open wide on stand - CGA   PWR step in seated 10x ea 3# AW x Blue dot to march laterally, orange dot for midline.  Mirror for visual feedback   STS with fwd toe tap to dot (B) 10x ea 3# AW x CGA    STS with lateral toe taps (B) 10x ea 3# AW x CGA          Standing:       Postural stretching at wall 1'  x Used wall for upright posture, tactile cueing at distal quads   OH lifts in front of chair 10x 4# ball     OH lifts while at wall 10x 4# ball     Cone from [facility] to [place] hand off to opp UE > to table 8x ea No UE support, tactile cueing at distal quads. Very fatigued with last set   High knee marches 5x ea   CGA, but required Mod-MaxA for  3x LOB correction          Gait:       Cone retrieval 6 cones x2  x    Cone weaves 4 cones x4 laps  x    Retro Walking with rollator 20ft x2      Gait with RW in hallway 50ft x4  x Goal to decrease R side gait deviation, upright posture, RW safety, and decrease RLE crossing midline. VC \"Vikings\" to break freezing on turns                 Other:    Specific Instructions for next treatment HS stretching, standing postural stretching against wall, over exaggerated movements, working on using a VC to break freezing. Assessment: [x] Progressing toward goals. 1/18: Began session with HS and postural stretching, followed by STS activities focused on both large movement, knee extension and upright posture upon standing. Patient able to complete all STS activities from low mat in community room without UE push off or support upon standing. Occasional postural sway but able to self correct. Added fwd and lateral toe taps to challenge dynamic balance without UE support and adding large movements. Continued with gait activities where patient demonstrated R path deviation and RLE crossing midline in swing phase. Continued with gait in hallway to focus on decreasing path deviations, RLE crossing midline, improving posture, and increasing safety awareness with RW. Patient would pick RW up to turn and occasionally to correct path deviation. Many verbal cues given to keep RW in contact with the floor, with poor carryover through session. Verbal cue of \"vikings\" used today to break freezing with turns but did not have much success. Discussed safe transfers as pt continues to demo impulsiveness and leaving RW at side when walking and transferring to chair. Improvement with seated PWR steps to dots with assist of visual feedback today with use of mirror. [] No change.      [] Other:     [x] Patient would continue to benefit from skilled physical therapy services in order to: improve balance, increased postural alignment, improve motor planning and getting larger movements, and improve gait especially with turning to allow for safe and more IND mobility in the home. GOALS:      STG: (to be met in 10 treatments)  Pt will be able to ambulate with 2WW at gait speed of > 0.6m/s to demonstrate a good home ambulation gait speed to increase mobility in the home. Pt will be able to consistently stand with bilateral knees extended with 2WW to improve standing alignment. Pt will be able to complete STS with supervision using AS and not needing assist to stabilize to improve safety with transfers in the home. Pt will consistent be able to turn to align with chair minimal freezing and ability to self correct to be mor efficient with transfers. Pt will improve score on DORAN balance scale by >/=6 points (>28 pt) to improve overall balance stability and decrease fall risk with mobility. Pt will improve time on 5xSTS to <20 seconds with use of RUE and consistently coming to full stand to decrease fall risk and increase functional capacity for mobility. LTG: (to be met in 20 treatments)  Pt will be able to ambulate with a rollator for > 500ft and around obstacles with supervision to maximize mobility for around the home. Pt will improve time on TUG to < 20 seconds with LRAD to be able to demonstrate decreased fall risk with home mobility. Pt will improve score on Self-assessment of PD Disability scale to < 26/115 to demonstrate improvement in functional ADL tasks that are meaningful. Pt will be independent with home program addressing strength, flexibility and balance to maximize gains made in therapy to improve overall functional capacity for mobility. Pt will report no falls for x6 weeks demonstrating improved safety with mobility and implementation of fall prevention strategies. Pt. Education:  [x] Yes  [] No  [] Reviewed Prior HEP/Ed  Method of Education: [x] Verbal  [x] Demo  [] Written  Edu: Posture, knee extension in standing, awareness with STS safety  Comprehension of Education:  [] Verbalizes understanding. [] Demonstrates understanding. [x] Needs review. [] Demonstrates/verbalizes HEP/Ed previously given. Plan: [x] Continue per plan of care.    [] Other:      Treatment Charges: Mins Units   []  Modalities     []  Ther Exercise     []  Manual Therapy     []  Ther Activities     []  Aquatics     [x]  Neuromuscular 39 3   [] Vasocompression     [x] Gait Training 20 1   [] Dry needling        [] 1 or 2 muscles        [] 3 or more muscles     []  Other     Total Treatment time 59 4     Time In: 12:03pm            Time Out: 1:02pm    Electronically signed by:  Fiona Caldwell PTA

## 2023-01-20 ENCOUNTER — HOSPITAL ENCOUNTER (OUTPATIENT)
Dept: PHYSICAL THERAPY | Age: 68
Setting detail: THERAPIES SERIES
Discharge: HOME | End: 2023-01-20
Payer: MEDICARE

## 2023-01-20 PROCEDURE — 97110 THERAPEUTIC EXERCISES: CPT

## 2023-01-20 PROCEDURE — 97530 THERAPEUTIC ACTIVITIES: CPT

## 2023-01-20 NOTE — FLOWSHEET NOTE
[facility] Out[facility] Physical [facility]   [address]   Phone: [phone]   Fax: [phone]    Physical Therapy Daily Treatment Note      Date:  2023  Patient Name:  Jessika Chaudhary    :  1955  MRN: [id number]  Physician: Triston Sena MD                               Insurance: [address] [place]   Medical Diagnosis:   G95.20 (ICD-10-CM) - Cord compression ([facility] [address]   G20 (ICD-10-CM) - Parkinson disease ([facility] [address]                 Rehab Codes: Z74.0 reduced mobility, R26.89 gait abnormality, R 29.3 abnormal posture, R27.8 lack of coordination   Date of symptom onset: 22 -DOS; Parkinson's 15+ years   Next 's appt. : 23 - Dr Keiko Flores ; Dr. Shakeel Ugalde 22  Visit# / total visits:   Cancels/No Shows: 0/0    Precautions: LSO to be worn when upright, PD with cognitive deficits, impulsive, HIGH FALL RISK     Subjective:  Arrives 8 min late, pt using a 2WW. He is not wearing his LSO because it is broken. He will be seeing surgeon next week and will take to see if they can fix it. Pain:  [] Yes  [x] No Location:denies   Pain Rating: (0-10 scale) denies/10  Pain altered Tx:  [x] No  [] Yes  Action:  Comments:     Objective:  INTERVENTIONS  INTERVENTIONS  Reps/ Time Weight/ Level Completed  Today Comments          EXERCISES        Sitting:       HS stretch on step 2x30'' ea  x Assist given to keep foot in DF and knee extended   STS with over head reach  2x5  x Hands on knees, therapist cue at trunk for forward weight translation    PWR step in seated 10x ea 3# AW  Blue dot to march laterally, orange dot for midline.  Mirror for visual feedback   STS with fwd toe tap to dot (B) 10x ea 3# AW x CGA to [place]  -- dot placed for large step   STS with lateral toe taps (B) 10x ea 3# AW  CGA          Standing:       DF stretch on slant board  2x30s   x    Step to target requiring 90 deg trun for foot placement  2x10 ea BUE support x Using green theradisc and pt had to orient his foot to fit fully on this    90 deg turns with visual cues  10x ea BUE support  x Start on dot and then step feet to black lines that follows for a 90 deg turn    Postural stretching at wall 1'   Used wall for upright posture, tactile cueing at distal quads   OH lifts in front of chair 10x 4# ball     OH lifts while at wall 10x 4# ball     [facility] from [facility] > to [place] hand off to opp UE > to table 8x ea   No UE support, tactile cueing at distal quads. Very fatigued with last set   High knee marches 5x ea   CGA, but required Mod-MaxA for  3x LOB correction          Gait:       Cone retrieval 6 cones x2      Cone weaves 4 cones x4 laps      Retro Walking with rollator 20ft x2      Gait with RW in hallway 50ft x4   Goal to decrease R side gait deviation, upright posture, RW safety, and decrease RLE crossing midline. VC \"Vikings\" to break freezing on turns          TRANSFERS       Stand pivots with 2WW: to L & R  10 minutes  x Getting x3 in < 4 steps -- still moderate shuffling; educated wife                                              Other:    Specific Instructions for next treatment HS stretching, standing postural stretching against wall, over exaggerated movements, working on using a VC to break freezing. working on better steps for turning with transfers, work on light gait       Assessment: [x] Progressing toward goals. Began session with HS & PF stretching to improve ability to be stand with more knee extension. Then worked on sit to stands promoting more power and worked on coming forward with reaching and stepping. Still needing moderate cues to maintain larger movements. Pt does best with visual targets with mod cues for recall to tasjk. Spent a lot of time focusing on coordinating steps for more efficient turning. Broke down task and built up to doing stand pivots. Difficulty with translation of this practice. Only gets 3 stand pivots out of ~ 12 with efficient stepping.  All were going to the R. Assisted pt to the lobby and educated wife on wanting pt to practice his transfers with goal of < 4 steps to improve carryover. [] No change. [] Other:     [x] Patient would continue to benefit from skilled physical therapy services in order to: improve balance, increased postural alignment, improve motor planning and getting larger movements, and improve gait especially with turning to allow for safe and more IND mobility in the home. GOALS:      STG: (to be met in 10 treatments)  Pt will be able to ambulate with 2WW at gait speed of > 0.6m/s to demonstrate a good home ambulation gait speed to increase mobility in the home. Pt will be able to consistently stand with bilateral knees extended with 2WW to improve standing alignment. Pt will be able to complete STS with supervision using AS and not needing assist to stabilize to improve safety with transfers in the home. Pt will consistent be able to turn to align with chair minimal freezing and ability to self correct to be mor efficient with transfers. Pt will improve score on DORAN balance scale by >/=6 points (>28 pt) to improve overall balance stability and decrease fall risk with mobility. Pt will improve time on 5xSTS to <20 seconds with use of RUE and consistently coming to full stand to decrease fall risk and increase functional capacity for mobility. LTG: (to be met in 20 treatments)  Pt will be able to ambulate with a rollator for > 500ft and around obstacles with supervision to maximize mobility for around the home. Pt will improve time on TUG to < 20 seconds with LRAD to be able to demonstrate decreased fall risk with home mobility. Pt will improve score on Self-assessment of PD Disability scale to < 26/115 to demonstrate improvement in functional ADL tasks that are meaningful.    Pt will be independent with home program addressing strength, flexibility and balance to maximize gains made in therapy to improve overall functional capacity for mobility. Pt will report no falls for x6 weeks demonstrating improved safety with mobility and implementation of fall prevention strategies. Pt. Education:  [x] Yes  [] No  [] Reviewed Prior HEP/Ed  Method of Education: [x] Verbal  [x] Demo  [] Written  Edu: Posture, knee extension in standing, awareness with STS safety  Comprehension of Education:  [] Verbalizes understanding. [] Demonstrates understanding. [x] Needs review. [] Demonstrates/verbalizes HEP/Ed previously given. Home Program Education:   - 1/20: discussed with wife to encourage < 4 steps with 2WW when turning. Pt to work on this through the week. Plan: [x] Continue per plan of care.    [] Other:      Treatment Charges: Mins Units   []  Modalities     [x]  Ther Exercise 25 2   []  Manual Therapy     [x]  Ther Activities 30 2   []  Aquatics     []  Neuromuscular     [] Vasocompression     [] Gait Training     [] Dry needling        [] 1 or 2 muscles        [] 3 or more muscles     []  Other     Total Treatment time 55 4     Time In: 12:08pm            Time Out:1:03pm    Electronically signed by:  Elvira Lopez, PT

## 2023-01-23 ENCOUNTER — HOSPITAL ENCOUNTER (OUTPATIENT)
Dept: PHYSICAL THERAPY | Age: 68
Setting detail: THERAPIES SERIES
Discharge: HOME | End: 2023-01-23
Payer: MEDICARE

## 2023-01-23 PROCEDURE — 97116 GAIT TRAINING THERAPY: CPT

## 2023-01-23 PROCEDURE — 97110 THERAPEUTIC EXERCISES: CPT

## 2023-01-23 NOTE — FLOWSHEET NOTE
509 [facility] Outpatient Physical Therapy   [facility]   Phone: [phone]   Fax: [phone]    Physical Therapy Daily Treatment Note      Date:  2023  Patient Name:  Theron Colorado    :  1955  MRN: [id number]  Physician: Shabana Babb MD                               Insurance: [address] [place]   Medical Diagnosis:   G95.20 (ICD-10-CM) - Cord compression ([facility] [address]   G20 (ICD-10-CM) - Parkinson disease ([facility] [address]                 Rehab Codes: Z74.0 reduced mobility, R26.89 gait abnormality, R 29.3 abnormal posture, R27.8 lack of coordination   Date of symptom onset: 22 -DOS; Parkinson's 15+ years   Next 's appt. : 23 - Dr Suzzette Bumpers ; Dr. Pacheco Labs 22  Visit# / total visits:   Cancels/No Shows: 0/0    Precautions: LSO to be worn when upright, PD with cognitive deficits, impulsive, HIGH FALL RISK     Subjective:  Pt arrives with 2WW. He notes he fell when turning a corner causing his feet to cross. His wife requests he works more on strengthening of the lower extremities. He notes trying to work on his turning steps some. Pain:  [] Yes  [x] No Location:denies   Pain Rating: (0-10 scale) denies/10  Pain altered Tx:  [x] No  [] Yes  Action:  Comments:     Objective:  INTERVENTIONS  INTERVENTIONS  Reps/ Time Weight/ Level Completed  Today Comments          EXERCISES        Sitting:       HS stretch on step 2x30'' ea  x Assist given to keep foot in DF and knee extended   STS with over head press 10x 5# wt x Better forward weight translation but difficulty on the back    PWR step in seated 10x ea 3# AW  Blue dot to march laterally, orange dot for midline.  Mirror for visual feedback   STS with fwd toe tap to dot (B) 10x ea 3# AW  CGA to [place]  -- dot placed for large step   STS with lateral toe taps (B) 10x ea 3# AW  CGA          Standing:       DF stretch on slant board  2x30s   x    TKE 2x15 ea green x    Heel/toe raises 2x20  x Squats  2x10  x Eccentric lower 3s with power up   -- tactile & Vcs to reach terminal knee extension    Lateral and posterior steps  15x ea 3# aw x BUE support on walker; SBA, cues for posture, encouraging large steps    Step to target requiring 90 deg trun for foot placement  2x10 ea BUE support  Using green theradisc and pt had to orient his foot to fit fully on this    90 deg turns with visual cues  10x ea BUE support   Start on dot and then step feet to black lines that follows for a 90 deg turn    Postural stretching at wall 1'   Used wall for upright posture, tactile cueing at distal quads   OH lifts in front of chair 10x 4# ball     OH lifts while at wall 10x 4# ball     [facility] from [facility] > to [place] hand off to opp UE > to table 8x ea   No UE support, tactile cueing at distal quads. Very fatigued with last set   High knee marches 5x ea   CGA, but required Mod-MaxA for  3x LOB correction          Gait:       Cone retrieval 6 cones x2      Cone weaves (4 cones) 2x4 laps  x CGA to [place] -- much more shuffling and festinating. Unable to get to use VC to break    Retro Walking with rollator 20ft x2      Gait with RW walking in large square working on turns  X3 laps ea direction  x Goal to reduce shuffling steps when turning           TRANSFERS       Stand pivots with 2WW: to L & R  10 minutes   Getting x3 in < 4 steps -- still moderate shuffling; educated wife                                              Other:    Specific Instructions for next treatment HS stretching, standing postural stretching against wall, over exaggerated movements, working on using a VC to break freezing. working on better steps for turning with transfers, work on light gait on treadmill for larger step practices; work on weight shifting toe taps       Assessment: [x] Progressing toward goals. Began session with functional strengthening and stretching to achieve better knee extension and proximal control for walking and standing balance.  Pt showing more power but till unaware of how to maintain full knee extension in standing. Then worked on gait training with turning. With Turns around the square able to maintain larger stride lengths with only 1-2 instances of festination. Tried to cue for use of \"vikings\" verbal cue but pt could not remember or verbalize. He had much more difficulty with weaving as this required alternating motor patterns. Much more shuffling and festinationg with his feet going posterior to trunk needing [place]yler for balance. Appeared to be more on toes creating limited weight shifting to be able to advance. Found best to stop and march a few steps before continuing to reduce this. Educated wife post session. [] No change. [] Other:     [x] Patient would continue to benefit from skilled physical therapy services in order to: improve balance, increased postural alignment, improve motor planning and getting larger movements, and improve gait especially with turning to allow for safe and more IND mobility in the home. GOALS:      STG: (to be met in 10 treatments)  Pt will be able to ambulate with 2WW at gait speed of > 0.6m/s to demonstrate a good home ambulation gait speed to increase mobility in the home. Pt will be able to consistently stand with bilateral knees extended with 2WW to improve standing alignment. Pt will be able to complete STS with supervision using AS and not needing assist to stabilize to improve safety with transfers in the home. Pt will consistent be able to turn to align with chair minimal freezing and ability to self correct to be mor efficient with transfers. Pt will improve score on DORAN balance scale by >/=6 points (>28 pt) to improve overall balance stability and decrease fall risk with mobility. Pt will improve time on 5xSTS to <20 seconds with use of RUE and consistently coming to full stand to decrease fall risk and increase functional capacity for mobility.     LTG: (to be met in 20 treatments)  Pt will be able to ambulate with a rollator for > 500ft and around obstacles with supervision to maximize mobility for around the home. Pt will improve time on TUG to < 20 seconds with LRAD to be able to demonstrate decreased fall risk with home mobility. Pt will improve score on Self-assessment of PD Disability scale to < 26/115 to demonstrate improvement in functional ADL tasks that are meaningful. Pt will be independent with home program addressing strength, flexibility and balance to maximize gains made in therapy to improve overall functional capacity for mobility. Pt will report no falls for x6 weeks demonstrating improved safety with mobility and implementation of fall prevention strategies. Pt. Education:  [x] Yes  [] No  [] Reviewed Prior HEP/Ed  Method of Education: [x] Verbal  [x] Demo  [] Written  Edu: Posture, knee extension in standing, awareness with STS safety  Comprehension of Education:  [] Verbalizes understanding. [] Demonstrates understanding. [x] Needs review. [] Demonstrates/verbalizes HEP/Ed previously given. Home Program Education:   - 1/20: discussed with wife to encourage < 4 steps with 2WW when turning. Pt to work on this through the week. Plan: [x] Continue per plan of care.    [] Other:      Treatment Charges: Mins Units   []  Modalities     [x]  Ther Exercise 31 2   []  Manual Therapy     []  Ther Activities     []  Aquatics     []  Neuromuscular     [] Vasocompression     [x] Gait Training 28 2   [] Dry needling        [] 1 or 2 muscles        [] 3 or more muscles     []  Other     Total Treatment time 59 4     Time In: 7526            Time Out: 0710    Electronically signed by:  Ivan Pearce PT

## 2023-01-27 ENCOUNTER — HOSPITAL ENCOUNTER (OUTPATIENT)
Dept: PHYSICAL THERAPY | Age: 68
Setting detail: THERAPIES SERIES
Discharge: HOME | End: 2023-01-27
Payer: MEDICARE

## 2023-01-27 PROCEDURE — 97110 THERAPEUTIC EXERCISES: CPT

## 2023-01-27 PROCEDURE — 97116 GAIT TRAINING THERAPY: CPT

## 2023-01-27 NOTE — FLOWSHEET NOTE
[facility]   [address]   Phone: [phone]   Fax: [phone]    Physical Therapy Daily Treatment Note      Date:  2023  Patient Name:  Fay Nuñez    :  1955  MRN: [id number]  Physician: Rell Julian MD                               Insurance: [address] [place]   Medical Diagnosis:   G95.20 (ICD-10-CM) - Cord compression ([facility] [address]   G20 (ICD-10-CM) - Parkinson disease ([facility] [address]                 Rehab Codes: Z74.0 reduced mobility, R26.89 gait abnormality, R 29.3 abnormal posture, R27.8 lack of coordination   Date of symptom onset: 22 -DOS; Parkinson's 15+ years   Next 's appt. : 23 - Dr Dale Khan ; Dr. Mckeon Morning 22  Visit# / total visits:   Cancels/No Shows: 0/0    Precautions: , PD with cognitive deficits, impulsive, HIGH FALL RISK     Subjective:  Pt arrives with 2WW. He notes he had a fall this morning when getting out of bed. Unsure what happened and denied any injury. Pt was able to get himself off the floor. He had follow up with MD yesterday and no longer needs brace. Pain:  [] Yes  [x] No Location:denies   Pain Rating: (0-10 scale) denies/10  Pain altered Tx:  [x] No  [] Yes  Action:  Comments:     Objective:  INTERVENTIONS  INTERVENTIONS  Reps/ Time Weight/ Level Completed  Today Comments          EXERCISES        Sitting:       HS stretch on step 2x30'' ea  x Assist given to keep foot in DF and knee extended   STS with over head press 10x 5# wt  Better forward weight translation but difficulty on the back    PWR step in seated 10x ea 3# AW  Blue dot to march laterally, orange dot for midline.  Mirror for visual feedback   STS with fwd toe tap to dot (B) 10x ea 3# AW  CGA to Kyler  -- dot placed for large step   STS with lateral toe taps (B) 10x ea 3# AW  CGA          Standing:       DF stretch on slant board  2x30s   x    TKE 2x15 ea green     Heel/toe raises 2x20      Squats  2x10 Eccentric lower 3s with power up   -- tactile & Vcs to reach terminal knee extension    Lateral and posterior steps  15x ea 3# aw  BUE support on walker; SBA, cues for posture, encouraging large steps    Step to target requiring 90 deg trun for foot placement  2x10 ea BUE support  Using green theradisc and pt had to orient his foot to fit fully on this    90 deg turns with visual cues  10x ea BUE support   Start on dot and then step feet to black lines that follows for a 90 deg turn    Postural stretching at wall 1'   Used wall for upright posture, tactile cueing at distal quads   OH lifts in front of chair 10x 4# ball     OH lifts while at wall 10x 4# ball     Cone from [facility] > to [facility] hand off to opp UE > to table 8x ea   No UE support, tactile cueing at distal quads. Very fatigued with last set   High knee marches 5x ea   CGA, but required Mod-MaxA for  3x LOB correction          Gait:       Cone retrieval 6 cones x2      Cone weaves (4 cones) 2x4 laps   CGA to Kyler -- much more shuffling and festinating.  Unable to get to use VC to break    Retro Walking with rollator 20ft x2      Gait with RW walking in large square working on turns  X3 laps ea direction   Goal to reduce shuffling steps when turning           Treadmill Training  Total 1143 ft   x With lite gait harness for safety, BUE on rails, facing [place]    Normal gait  5 min  0.6-1.0 mph  x Cueing for attention to visual line on treadmill to reduce scissoring, cues to maintain Larger step lengths   Progressively increasing speed    Stepping over dots on treadmill  3 min   6 min  1.0mph  x Initial bout pt having difficulty processing, stopped and then resumed   Much more success 2nd bout, not putting dots down until he maintains consistent long strides           Post Treadmill        Overground with rollator  250ft   x Good step lengths, a little fast, no scissoring    Marching with rollator 2x75ft   x Cues to slow down, marches lessen with reps and going too fast Backwards with rollator  2x75ft   x Seated rest break btwn; needing CGA as gets moving too fast having retro loss of balance           TRANSFERS       Stand pivots with 2WW: to L & R  10 minutes   Getting x3 in < 4 steps -- still moderate shuffling; educated wife    Education throughout  session to have better alignment to chair    x Increased time for all transfers to ensure aligning and finding with hands                                       Other:    Specific Instructions for next treatment HS stretching, standing postural stretching against wall, over exaggerated movements, working on using a VC to break freezing. working on better steps for turning with transfers, work on light gait on treadmill for larger step practices; work on weight shifting toe taps       Assessment: [x] Progressing toward goals. ***    Began session with functional strengthening and stretching to achieve better knee extension and proximal control for walking and standing balance. Pt showing more power but till unaware of how to maintain full knee extension in standing. Then worked on gait training with turning. With Turns around the square able to maintain larger stride lengths with only 1-2 instances of festination. Tried to cue for use of \"vikings\" verbal cue but pt could not remember or verbalize. He had much more difficulty with weaving as this required alternating motor patterns. Much more shuffling and festinationg with his feet going posterior to trunk needing Kyler for balance. Appeared to be more on toes creating limited weight shifting to be able to advance. Found best to stop and march a few steps before continuing to reduce this. Educated wife post session. [] No change.      [] Other:     [x] Patient would continue to benefit from skilled physical therapy services in order to: improve balance, increased postural alignment, improve motor planning and getting larger movements, and improve gait especially with turning to allow for safe and more IND mobility in the home. GOALS:      STG: (to be met in 10 treatments)  Pt will be able to ambulate with 2WW at gait speed of > 0.6m/s to demonstrate a good home ambulation gait speed to increase mobility in the home. Pt will be able to consistently stand with bilateral knees extended with 2WW to improve standing alignment. Pt will be able to complete STS with supervision using AS and not needing assist to stabilize to improve safety with transfers in the home. Pt will consistent be able to turn to align with chair minimal freezing and ability to self correct to be mor efficient with transfers. Pt will improve score on DORAN balance scale by >/=6 points (>28 pt) to improve overall balance stability and decrease fall risk with mobility. Pt will improve time on 5xSTS to <20 seconds with use of RUE and consistently coming to full stand to decrease fall risk and increase functional capacity for mobility. LTG: (to be met in 20 treatments)  Pt will be able to ambulate with a rollator for > 500ft and around obstacles with supervision to maximize mobility for around the home. Pt will improve time on TUG to < 20 seconds with LRAD to be able to demonstrate decreased fall risk with home mobility. Pt will improve score on Self-assessment of PD Disability scale to < 26/115 to demonstrate improvement in functional ADL tasks that are meaningful. Pt will be independent with home program addressing strength, flexibility and balance to maximize gains made in therapy to improve overall functional capacity for mobility. Pt will report no falls for x6 weeks demonstrating improved safety with mobility and implementation of fall prevention strategies.        Pt. Education:  [x] Yes  [] No  [] Reviewed Prior HEP/Ed  Method of Education: [x] Verbal  [x] Demo  [] Written  Edu: Posture, knee extension in standing, awareness with STS safety  Comprehension of Education:  [] [employer] understanding. [] Demonstrates understanding. [x] Needs review. [] Demonstrates/verbalizes HEP/Ed previously given. Home Program Education:   - 1/20: discussed with wife to encourage < 4 steps with 2WW when turning. Pt to work on this through the week. - 1/27: Discussed with wife to encourage pt to stop when hitting something and bringing attention to the environment to see what caused obstabcle to build insight and problem solving. Plan: [x] Continue per plan of care.    [] Other:      Treatment Charges: Mins Units   []  Modalities     [x]  Ther Exercise 12 1   []  Manual Therapy     []  Ther Activities     []  Aquatics     []  Neuromuscular     [] Vasocompression     [x] Gait Training 44 3   [] Dry needling        [] 1 or 2 muscles        [] 3 or more muscles     []  Other     Total Treatment time 56 4     Time In: 1100           Time Out: 9661     Electronically signed by:  Ivan Pearce PT

## 2023-02-01 ENCOUNTER — HOSPITAL ENCOUNTER (OUTPATIENT)
Dept: PHYSICAL THERAPY | Age: 68
Setting detail: THERAPIES SERIES
Discharge: HOME | End: 2023-02-01
Payer: MEDICARE

## 2023-02-01 PROCEDURE — 97116 GAIT TRAINING THERAPY: CPT

## 2023-02-01 PROCEDURE — 97530 THERAPEUTIC ACTIVITIES: CPT

## 2023-02-01 PROCEDURE — 97112 NEUROMUSCULAR REEDUCATION: CPT

## 2023-02-01 NOTE — FLOWSHEET NOTE
[address] [facility]   [address]   Phone: [phone]   Fax: [phone]    Physical Therapy Daily Treatment Note      Date:  2023  Patient Name:  Griselda Plaza    :  1955  MRN: [id number]  Physician: Royal Tori MD                               Insurance: [address] [place]   Medical Diagnosis:   G95.20 (ICD-10-CM) - Cord compression ([facility] [address]   G20 (ICD-10-CM) - Parkinson disease ([facility] [address]                 Rehab Codes: Z74.0 reduced mobility, R26.89 gait abnormality, R 29.3 abnormal posture, R27.8 lack of coordination   Date of symptom onset: 22 -DOS; Parkinson's 15+ years   Next 's appt. : 23 - Dr Rik Faulkner ; Dr. Keiko Orellana 22  Visit# / total visits:   Cancels/No Shows: 0/0    Precautions: , PD with cognitive deficits, impulsive, HIGH FALL RISK     Subjective:  Pt arrives with 2WW. He notes he is having more pain in the lower abdomen area upon awakening. He notes this goes away with movement. He reports he fell In the kitchen as he fell on his butt while walking. Unclear as to why.        Pain:  [] Yes  [x] No Location:denies   Pain Rating: (0-10 scale) denies/10  Pain altered Tx:  [x] No  [] Yes  Action:  Comments:     Objective:  INTERVENTIONS  INTERVENTIONS  Reps/ Time Weight/ Level Completed  Today Comments          EXERCISES        Transfer training     TOTAL TIME 15'   Sit to stands  x10  x From chair with with hands on ball to facilitate forward weight translation    Sit to stand with holds x10 Hold 5s x UE support from chair , holding balance on standing    Squat + stand with moving cones  20x  x Squats then grabs cone to move laterally to target as he stands    Sit to stand with stopping ball  10x  x Once in standing ball rolled at him and he stops the ball for reactive & anticipatory balance    Education to align to chair surface & hand placement when sitting    x Completed throughout session -- allowed increased time to problem solve and gave cues as needed           Stretches        HS stretch on step 2x30'' ea   Assist given to keep foot in DF and knee extended   DF stretch on slant board  2x30s              Strength        TKE 2x15 ea green     Heel/toe raises 2x20      Lateral and posterior steps  15x ea 3# aw  BUE support on walker; SBA, cues for posture, encouraging large steps           BALANCE       Low to high rotational ball passes  20x   x In standing           Step to target requiring 90 deg trun for foot placement  2x10 ea BUE support  Using green theradisc and pt had to orient his foot to fit fully on this    90 deg turns with visual cues  10x ea BUE support   Start on dot and then step feet to black lines that follows for a 90 deg turn    OH lifts in front of chair 10x 4# ball     OH lifts while at wall 10x 4# ball     Cone from [facility] > to [place] hand off to opp UE > to table 8x ea   No UE support, tactile cueing at distal quads. Very fatigued with last set   High knee marches 5x ea   CGA, but required Mod-MaxA for  3x LOB correction          Gait:       Cone retrieval 6 cones x2      Cone weaves (4 cones) 2x4 laps   CGA to Kyler -- much more shuffling and festinating.  Unable to get to use VC to break    Retro Walking with rollator 20ft x2      Gait with RW walking in large square working on turns  X3 laps ea direction   Goal to reduce shuffling steps when turning    Walking over cones with rollator  10 min  CGA x Cues to approximate to cones vs taking too early of a step causing imbalance                         Treadmill Training  Total 1143 ft    With lite gait harness for safety, BUE on rails, facing Yok    Normal gait  5 min  0.6-1.0 mph   Cueing for attention to visual line on treadmill to reduce scissoring, cues to maintain Larger step lengths   Progressively increasing speed    Stepping over dots on treadmill  3 min   6 min  1.0mph   Initial bout pt having difficulty processing, stopped and then resumed   Much more success 2nd bout, not putting dots down until he maintains consistent long strides           Post Treadmill        Overground with rollator  250ft    Good step lengths, a little fast, no scissoring    Marching with rollator 2x75ft    Cues to slow down, marches lessen with reps and going too fast    Backwards with rollator  2x75ft    Seated rest break btwn; needing CGA as gets moving too fast having retro loss of balance           Other:    Specific Instructions for next treatment HS stretching, standing postural stretching against wall, over exaggerated movements, working on using a VC to break freezing. working on better steps for turning with transfers, work on light gait on treadmill for larger step practices; work on weight shifting toe taps       Assessment: [x] Progressing toward goals. Began with focus on sit to stand work from a chair working on getting more forward weight translation and working on steadying upon immediate standing. Still needing moderate cues to align to surface & hand placement to feel for chair prior to sitting. At times gets unsteady due to being more on toes so need to ensure feet are flat. Also noticing core control is lacking with standing balance so worked on reaching outside CLARA and rotational ball passes. Added element of reactive and anticipatory balance with pushing/stopping a ball at pt once standing. Then worked on gait training with rollator with stepping over cones to try to increase step height. Limited in that he would try to step too early vs approximating step just before the cone. CGA needed. Discussed with wife post session about needing to have devices closer. Educated on having walker at edge of bed vs across the room so he always is using it and to reduce fall risk. Discussed cues to translate forward with nose over toes and hand placement (one on walker & one on surface) to help with transfers and reduce falls.  She notes understanding but pt not as receptive. [] No change. [] Other:     [x] Patient would continue to benefit from skilled physical therapy services in order to: improve balance, increased postural alignment, improve motor planning and getting larger movements, and improve gait especially with turning to allow for safe and more IND mobility in the home. GOALS:      STG: (to be met in 10 treatments)  Pt will be able to ambulate with 2WW at gait speed of > 0.6m/s to demonstrate a good home ambulation gait speed to increase mobility in the home. Pt will be able to consistently stand with bilateral knees extended with 2WW to improve standing alignment. Pt will be able to complete STS with supervision using AS and not needing assist to stabilize to improve safety with transfers in the home. Pt will consistent be able to turn to align with chair minimal freezing and ability to self correct to be mor efficient with transfers. Pt will improve score on ODRAN balance scale by >/=6 points (>28 pt) to improve overall balance stability and decrease fall risk with mobility. Pt will improve time on 5xSTS to <20 seconds with use of RUE and consistently coming to full stand to decrease fall risk and increase functional capacity for mobility. LTG: (to be met in 20 treatments)  Pt will be able to ambulate with a rollator for > 500ft and around obstacles with supervision to maximize mobility for around the home. Pt will improve time on TUG to < 20 seconds with LRAD to be able to demonstrate decreased fall risk with home mobility. Pt will improve score on Self-assessment of PD Disability scale to < 26/115 to demonstrate improvement in functional ADL tasks that are meaningful. Pt will be independent with home program addressing strength, flexibility and balance to maximize gains made in therapy to improve overall functional capacity for mobility.     Pt will report no falls for x6 weeks demonstrating improved safety with mobility and implementation of fall prevention strategies. Pt. Education:  [x] Yes  [] No  [] Reviewed Prior HEP/Ed  Method of Education: [x] Verbal  [x] Demo  [] Written  Edu: Posture, knee extension in standing, awareness with STS safety  Comprehension of Education:  [] Verbalizes understanding. [] Demonstrates understanding. [x] Needs review. [] Demonstrates/verbalizes HEP/Ed previously given. Home Program Education:   - 1/20: discussed with wife to encourage < 4 steps with 2WW when turning. Pt to work on this through the week. - 1/27: Discussed with wife to encourage pt to stop when hitting something and bringing attention to the environment to see what caused obstabcle to build insight and problem solving.   -2/1: educated pt and wife to move walker to EOB vs across the room for safety . Plan: [x] Continue per plan of care.    [] Other:      Treatment Charges: Mins Units   []  Modalities     []  Ther Exercise     []  Manual Therapy     [x]  Ther Activities 34 2   []  Aquatics     [x]  Neuromuscular 10 1   [] Vasocompression     [x] Gait Training 10 1   [] Dry needling        [] 1 or 2 muscles        [] 3 or more muscles     []  Other     Total Treatment time 54 4     Time In: 1648         Time Out: 2005     Electronically signed by:  Nicole Britton, PT

## 2023-02-03 ENCOUNTER — HOSPITAL ENCOUNTER (OUTPATIENT)
Dept: PHYSICAL THERAPY | Age: 68
Setting detail: THERAPIES SERIES
Discharge: HOME | End: 2023-02-03
Payer: MEDICARE

## 2023-02-03 PROCEDURE — 97116 GAIT TRAINING THERAPY: CPT

## 2023-02-03 PROCEDURE — 97530 THERAPEUTIC ACTIVITIES: CPT

## 2023-02-03 PROCEDURE — 97112 NEUROMUSCULAR REEDUCATION: CPT

## 2023-02-03 NOTE — FLOWSHEET NOTE
[facility]   [address]   Phone: [phone]   Fax: [phone]    Physical Therapy Daily Treatment Note      Date:  2/3/2023  Patient Name:  Doroteo Mariee    :  1955  MRN: [id number]  Physician: Christa Monroy MD                               Insurance: [address] [place]   Medical Diagnosis:   G95.20 (ICD-10-CM) - Cord compression ([facility] [address]   G20 (ICD-10-CM) - Parkinson disease ([facility] [address]                 Rehab Codes: Z74.0 reduced mobility, R26.89 gait abnormality, R 29.3 abnormal posture, R27.8 lack of coordination   Date of symptom onset: 22 -DOS; Parkinson's 15+ years   Next 's appt. : 23 - Dr Macie Nichols   Visit# / total visits:   Cancels/No Shows: 0/0    Precautions: , PD with cognitive deficits, impulsive, HIGH FALL RISK     Subjective:  Pt arrives with 2WW. Pt denies any falls. He notes he still wants to work on sit to stand practice.      Pain:  [x] Yes  [] No Location: lumbar   Pain Rating: (0-10 scale) 1-2/10  Pain altered Tx:  [x] No  [] Yes  Action:  Comments:     Objective:  INTERVENTIONS  INTERVENTIONS  Reps/ Time Weight/ Level Completed  Today Comments          EXERCISES        Transfer training     TOTAL TIME 15'   Sit to stands  x10  x From chair with with hands on ball to facilitate forward weight translation    Sit to stand with moving rings  x12  x UE support from chair, then moves x2 rings across arc with reaching cross body    Squat + stand with moving cones  20x   Squats then grabs cone to move laterally to target as he stands    Sit to stand with stopping ball  10x  x Once in standing ball rolled at him and he stops the ball for reactive & anticipatory balance    Sit to stand with forward step to target 2x6 ea LE step  x CGA to Kyler    Education to align to chair surface & hand placement when sitting    x Completed throughout session -- allowed increased time to problem solve and gave cues as needed           Stretches        HS stretch on step 2x30'' ea   Assist given to keep foot in DF and knee extended   DF stretch on slant board  2x30s              Strength        TKE 2x15 ea green     Heel/toe raises 2x20      Lateral and posterior steps  15x ea 3# aw  BUE support on walker; SBA, cues for posture, encouraging large steps           BALANCE       Low to high rotational ball passes  15x ea  x In standing -- limited tolerance due to knee discomfort   Cross body reaches to move cone with cog dual task X6 ea   x Naming color with RUE   Stating object that is the color with LUE    Forward reaches to place cones in box  X6 ea  x    Standing Ball tosses 2x20  x    Standing Ball bounces  2x20   x    Step to target requiring 90 deg trun for foot placement  2x10 ea BUE support  Using green theradisc and pt had to orient his foot to fit fully on this    90 deg turns with visual cues  10x ea BUE support   Start on dot and then step feet to black lines that follows for a 90 deg turn    OH lifts in front of chair 10x 4# ball     OH lifts while at wall 10x 4# ball     Cone from [facility] > to [place] hand off to opp UE > to table 8x ea   No UE support, tactile cueing at distal quads. Very fatigued with last set   High knee marches 5x ea   CGA, but required Mod-MaxA for  3x LOB correction          Gait:       Cone retrieval 6 cones x2      Cone weaves (4 cones) 2x4 laps   CGA to Kyler -- much more shuffling and festinating.  Unable to get to use VC to break    Retro Walking with rollator 20ft x2      Gait with RW walking in large square working on turns  X3 laps ea direction   Goal to reduce shuffling steps when turning    Walking over cones with rollator  10 min  CGA  Cues to approximate to cones vs taking too early of a step causing imbalance    Rollator with head turns vert & horizontal 2x75ft ea  x Inconsistent in keeping head moving    Walking with marches  2x75ft  x Bradykinesia after about 3 steps    Cognitive dual tasks with walking  4x50ft  x Slows walking but pt is more upright and controlled. -- discussed with wife post session 2/3          Treadmill Training  Total 1143 ft    With lite gait harness for safety, BUE on rails, facing [place]    Normal gait  5 min  0.6-1.0 mph   Cueing for attention to visual line on treadmill to reduce scissoring, cues to maintain Larger step lengths   Progressively increasing speed    Stepping over dots on treadmill  3 min   6 min  1.0mph   Initial bout pt having difficulty processing, stopped and then resumed   Much more success 2nd bout, not putting dots down until he maintains consistent long strides           Post Treadmill        Overground with rollator  250ft    Good step lengths, a little fast, no scissoring    Marching with rollator 2x75ft    Cues to slow down, marches lessen with reps and going too fast    Backwards with rollator  2x75ft    Seated rest break btwn; needing CGA as gets moving too fast having retro loss of balance           Other:    Specific Instructions for next treatment HS stretching, standing postural stretching against wall, over exaggerated movements, working on using a VC to break freezing. working on better steps for turning with transfers, work on light gait on treadmill for larger step practices; work on weight shifting toe taps       Assessment: [x] Progressing toward goals. Began with focus on sit to stand work from a chair working on getting more forward weight translation and working on steadying upon immediate standing. Better awareness to align with surface prior to standing. Added reactive balance upon standing with reaching and stepping tasks. With balance worked on reaching outside CLARA and reactive/anticipatory balance with use of a ball. Moderate trunk instability when using the ball. Cues to be more upright and control for trunk. With walking tasks noted with motor dual task has more uncontrolled movements and gets more bradykinetic.  When adding cognitive dual task causes pt to slow down, step with more control, and be more stable in the trunk. Even if pt was not naming in the category the thought processing with it still lead to improved gait. Discussed with wife post session to continue to challenge with walking with pt at home when he is walking to see if this increases stability. Overall pt is fatigued from session,     [] No change. [] Other:     [x] Patient would continue to benefit from skilled physical therapy services in order to: improve balance, increased postural alignment, improve motor planning and getting larger movements, and improve gait especially with turning to allow for safe and more IND mobility in the home. GOALS:      STG: (to be met in 10 treatments)  Pt will be able to ambulate with 2WW at gait speed of > 0.6m/s to demonstrate a good home ambulation gait speed to increase mobility in the home. Pt will be able to consistently stand with bilateral knees extended with 2WW to improve standing alignment. Pt will be able to complete STS with supervision using AS and not needing assist to stabilize to improve safety with transfers in the home. Pt will consistent be able to turn to align with chair minimal freezing and ability to self correct to be mor efficient with transfers. Pt will improve score on DORAN balance scale by >/=6 points (>28 pt) to improve overall balance stability and decrease fall risk with mobility. Pt will improve time on 5xSTS to <20 seconds with use of RUE and consistently coming to full stand to decrease fall risk and increase functional capacity for mobility. LTG: (to be met in 20 treatments)  Pt will be able to ambulate with a rollator for > 500ft and around obstacles with supervision to maximize mobility for around the home. Pt will improve time on TUG to < 20 seconds with LRAD to be able to demonstrate decreased fall risk with home mobility.    Pt will improve score on Self-assessment of PD Disability scale to < 26/115 to demonstrate improvement in functional ADL tasks that are meaningful.   Pt will be independent with home program addressing strength, flexibility and balance to maximize gains made in therapy to improve overall functional capacity for mobility.    Pt will report no falls for x6 weeks demonstrating improved safety with mobility and implementation of fall prevention strategies.       Pt. Education:  [x] Yes  [] No  [] Reviewed Prior HEP/Ed  Method of Education: [x] Verbal  [x] Demo  [] Written  Edu: Posture, knee extension in standing, awareness with STS safety  Comprehension of Education:  [] Verbalizes understanding.  [] Demonstrates understanding.  [x] Needs review.  [] Demonstrates/verbalizes HEP/Ed previously given.     Home Program Education:   - 1/20: discussed with wife to encourage < 4 steps with 2WW when turning. Pt to work on this through the week.   - 1/27: Discussed with wife to encourage pt to stop when hitting something and bringing attention to the environment to see what caused obstabcle to build insight and problem solving.   -2/1: educated pt and wife to move walker to EOB vs across the room for safety .    Plan: [x] Continue per plan of care.   [] Other:      Treatment Charges: Mins Units   []  Modalities     []  Ther Exercise     []  Manual Therapy     [x]  Ther Activities 34 2   []  Aquatics     [x]  Neuromuscular 13 1   [] Vasocompression     [x] Gait Training 10 1   [] Dry needling        [] 1 or 2 muscles        [] 3 or more muscles     []  Other     Total Treatment time 57 4     Time In: 1103        Time Out: 1200     Electronically signed by:  Zay Key PT

## 2023-02-08 ENCOUNTER — OFFICE VISIT (OUTPATIENT)
Dept: PHYSICAL MEDICINE AND REHAB | Age: 68
End: 2023-02-08

## 2023-02-08 ENCOUNTER — HOSPITAL ENCOUNTER (OUTPATIENT)
Dept: PHYSICAL THERAPY | Age: 68
Setting detail: THERAPIES SERIES
Discharge: HOME | End: 2023-02-08
Payer: MEDICARE

## 2023-02-08 VITALS
HEIGHT: 74 IN | WEIGHT: 240 LBS | DIASTOLIC BLOOD PRESSURE: 84 MMHG | SYSTOLIC BLOOD PRESSURE: 132 MMHG | BODY MASS INDEX: 30.8 KG/M2 | TEMPERATURE: 97.6 F

## 2023-02-08 DIAGNOSIS — G20 PARKINSON DISEASE (HCC): ICD-10-CM

## 2023-02-08 DIAGNOSIS — G89.29 CHRONIC BILATERAL LOW BACK PAIN WITHOUT SCIATICA: ICD-10-CM

## 2023-02-08 DIAGNOSIS — M54.50 CHRONIC BILATERAL LOW BACK PAIN WITHOUT SCIATICA: ICD-10-CM

## 2023-02-08 DIAGNOSIS — G95.20 CORD COMPRESSION (HCC): Primary | ICD-10-CM

## 2023-02-08 PROCEDURE — 97530 THERAPEUTIC ACTIVITIES: CPT

## 2023-02-08 PROCEDURE — 97112 NEUROMUSCULAR REEDUCATION: CPT

## 2023-02-08 NOTE — PROGRESS NOTES
[facility]   [facility]   Phone: [phone]   Fax: [phone]    Physical Therapy Daily Treatment Note/PROGRESS NOTE       Date:  2023  Patient Name:  Jet Das    :  1955  MRN: [id number]  Physician: Estrella Boateng MD                               Insurance: [address] [place]   Medical Diagnosis:   G95.20 (ICD-10-CM) - Cord compression ([facility] [address]   G20 (ICD-10-CM) - Parkinson disease ([facility] [address]                 Rehab Codes: Z74.0 reduced mobility, R26.89 gait abnormality, R 29.3 abnormal posture, R27.8 lack of coordination   Date of symptom onset: 22 -DOS; Parkinson's 15+ years   Next 's appt. : 23 - Dr Grace Key   Visit# / total visits:   Cancels/No Shows: 0/0  Date of initial visit: 22        Date of PN: 23 (visit 9)  Formal progress note reporting period:  22 - 23     Precautions: PD with cognitive deficits, impulsive, HIGH FALL RISK     Subjective:  Pt arrives with rollator. Pt notes he fell last night backwards when he was pulling things out of a cupboard. Pt notes he was able to arise on his own. He notes is better able to get up and down off the couch and various surfaces with greater ease. He has seen neurology and PMR with no changes to care at this time.      Pain:  [x] Yes  [] No Location: lumbar   Pain Rating: (0-10 scale) 1-2/10  Pain altered Tx:  [x] No  [] Yes  Action:  Comments:     Objective:  INTERVENTIONS  INTERVENTIONS  Reps/ Time Weight/ Level Completed  Today Comments          EXERCISES        Transfer training     TOTAL TIME    Sit to stands  x10   From chair with with hands on ball to facilitate forward weight translation    Sit to stand with moving rings  x12   UE support from chair, then moves x2 rings across arc with reaching cross body    Squat + stand with moving cones  20x   Squats then grabs cone to move laterally to target as he stands    Sit to stand with stopping ball  10x   Once in standing ball rolled at him and he stops the ball for reactive & anticipatory balance    Sit to stand with forward step to target 2x6 ea LE step   CGA to Kyler    Education to align to chair surface & hand placement when sitting     Completed throughout session -- allowed increased time to problem solve and gave cues as needed           Stretches        HS stretch on step 2x30'' ea   Assist given to keep foot in DF and knee extended   DF stretch on slant board  2x30s              Strength        TKE 2x15 ea green     Heel/toe raises 2x20             BALANCE       Low to high rotational ball passes  15x ea   In standing -- limited tolerance due to knee discomfort   Standing Ball tosses 2x20  x    Standing Ball bounces  2x20   x    Step to target requiring 90 deg trun for foot placement  2x10 ea BUE support  Using green theradisc and pt had to orient his foot to fit fully on this    90 deg turns with visual cues  10x ea BUE support   Start on dot and then step feet to black lines that follows for a 90 deg turn    OH lifts in front of chair 10x 4# ball     OH lifts while at wall 10x 4# ball     Cone from [facility] > to [facility] hand off to opp UE > to table 8x ea   No UE support, tactile cueing at distal quads. Very fatigued with last set   High knee marches 5x ea   CGA, but required Mod-MaxA for  3x LOB correction          BITS  [id number]   Total time: 19 minutes    Visual scan- user paced 20x2  x 1x reaching with LUE, 1x reaching with RUE   Has one LOB needing Kyler to correct   Complex array - sequence  30x2 #s, size 6 x 1x reaching with LUE, 1x reaching with RUE    Visual scan - user paced with screen low 20x2  x 1x reaching with LUE, 1x reaching with RUE                         Gait:       Cone retrieval 6 cones x2      Cone weaves (4 cones) 2x4 laps   CGA to Kyler -- much more shuffling and festinating.  Unable to get to use VC to break    Retro Walking with rollator 20ft x2      Gait with RW walking in large square working on turns  X3 laps ea direction   Goal to reduce shuffling steps when turning    Walking over cones with rollator  10 min  CGA  Cues to approximate to cones vs taking too early of a step causing imbalance    Rollator with head turns vert & horizontal 2x75ft ea   Inconsistent in keeping head moving    Walking with marches  2x75ft   Bradykinesia after about 3 steps    Cognitive dual tasks with walking  4x50ft   Slows walking but pt is more upright and controlled. -- discussed with wife post session 2/3          Treadmill Training  Total 1143 ft    With lite gait harness for safety, BUE on rails, facing [place]    Normal gait  5 min  0.6-1.0 mph   Cueing for attention to visual line on treadmill to reduce scissoring, cues to maintain Larger step lengths   Progressively increasing speed    Stepping over dots on treadmill  3 min   6 min  1.0mph   Initial bout pt having difficulty processing, stopped and then resumed   Much more success 2nd bout, not putting dots down until he maintains consistent long strides           Post Treadmill        Overground with rollator  250ft    Good step lengths, a little fast, no scissoring    Marching with rollator 2x75ft    Cues to slow down, marches lessen with reps and going too fast    Backwards with rollator  2x75ft    Seated rest break btwn; needing CGA as gets moving too fast having retro loss of balance           Other:    Home Program Education:   - 1/20: discussed with wife to encourage < 4 steps with 2WW when turning. Pt to work on this through the week. - 1/27: Discussed with wife to encourage pt to stop when hitting something and bringing attention to the environment to see what caused obstabcle to build insight and problem solving.   -2/1: educated pt and wife to move walker to EOB vs across the room for safety . - 2/8: educated wife on progress & discussed POC. Talked with her to stop pt when he becomes unsteady vs letting him continue with the task. Tests & measures 2/8/23   Outcome Measures  1/9/23 2/8/23    DORAN 22/56  30/56   5xSTS 27 seconds- with UE support   12.89s with LUE support    10mWT    9.38s >> 1.07 m/s with rollator     6 minute walk test        TUG  34 seconds with 2WW and Kyler for balance  19.36s with rollator with supervision    TUG Cog --   35.92 with rollator (counting upwards by 6)   50.5s with rollator, freezing with turning (counting upwards by 4)    Self assessment of PD disability Scale   34/115                Specific Instructions for next treatment: work on cog dual tasking with walking, work on standing balance, sped up 3 way steps for balance reactions. Assessment: [x] Progressing toward goals. Pt has now completed x9 visits in POC. Since eval he has progressed well showing better standing alignment, ability to arise easier from chairs, and better gait control. He has not had much back pain which is a positive. He has progressed to using the rollator vs 2WW which is much better as he tends to lift the 2WW. Pt has improved performance on outcome measures. Gait speed is 1.07 m/s with good controlled steps. 5xSTS improves ~14 seconds, DORAN increases 8 points to 30/56. Would still consider pt a fall risk due to freezing of gait, instability with turning, decreased awareness to correct balance when unstable, and decreased executive functioning likely from advance PD and dementia. Pt still having frequent falls so will be best to continue to use device and have close supervision to SBA for all mobility. Pt tends to keep doing an activity with limited awareness or reactions to try to correct his balance. Will need to continue to improve this. Also has difficulty with reaching outside CLARA so will continue to address this. Did add TUG vs TUG cog assessment this date. Significantly impacted by moderate cognitive tasks with mobility. With simpler math or conversation ambulates more controlled (not moving too fast).  Continue to work to build more awareness for need for control with mobility. Overall feel this patient is progress but could still benefit for standing and dynamic balance work. Will continue per POC at this time. [] No change. [] Other:     [x] Patient would continue to benefit from skilled physical therapy services in order to: improve balance, increased postural alignment, improve motor planning and getting larger movements, and improve gait especially with turning to allow for safe and more IND mobility in the home. GOALS:      STG: (to be met in 10 treatments) -assessed 2/8/23  Pt will be able to ambulate with 2WW at gait speed of > 0.6m/s to demonstrate a good home ambulation gait speed to increase mobility in the home. - 2/8: MET   Pt will be able to consistently stand with bilateral knees extended with 2WW to improve standing alignment.    -2/8: MET   Pt will be able to complete STS with supervision using AD and not needing assist to stabilize to improve safety with transfers in the home. - 2/8: MET    Pt will consistent be able to turn to align with chair minimal freezing and ability to self correct to be mor efficient with transfers.   -2/8 :Progressing -- still needing moderate cues    Pt will improve score on DORAN balance scale by >/=6 points (>28 pt) to improve overall balance stability and decrease fall risk with mobility.      - 2/8: MET - 30/56 this date    Pt will improve time on 5xSTS to <20 seconds with use of RUE and consistently coming to full stand to decrease fall risk and increase functional capacity for mobility.    -2/8: MET but using L UE support  LTG: (to be met in 20 treatments)  Pt will be able to ambulate with a rollator for > 500ft and around obstacles with supervision to maximize mobility for around the home.     -2/8: Progressing    NEW GOAL 2/8: Pt will improve score on DORAN balance scale by >/=6 points (>36 pt) to improve overall balance stability and decrease fall risk with mobility. Pt will improve time on TUG to < 20 seconds with LRAD to be able to demonstrate decreased fall risk with home mobility.    -2/8: MET   3. NEW GOAL 2/8: Pt will be able to complete turns with navigation with no freezing 50% of time showing improved ability and decreased fall risk with navigating at home. 4. NEW GOAL 2/8: Pt will be able to self correct freezing episode in < 3 seconds to improve fluidity of mobility with less impact of freezing. Pt will improve score on Self-assessment of PD Disability scale to < 26/115 to demonstrate improvement in functional ADL tasks that are meaningful.    -2/8 : Will assess at later time. Pt will be independent with home program addressing strength, flexibility and balance to maximize gains made in therapy to improve overall functional capacity for mobility.     -2/8: progressing -- continuing to update wife   Pt will report no falls for x6 weeks demonstrating improved safety with mobility and implementation of fall prevention strategies. -2/8: continuing to educate as pt still having falls. Recommending close supervision to SBA. Pt. Education:  [x] Yes  [] No  [] Reviewed Prior HEP/Ed  Method of Education: [x] Verbal  [x] Demo  [] Written  Edu: Posture, knee extension in standing, awareness with STS safety  Comprehension of Education:  [] Verbalizes understanding. [] Demonstrates understanding. [x] Needs review. [] Demonstrates/verbalizes HEP/Ed previously given. Plan: [x] Continue per plan of care.    [] Other:      Treatment Plan:  [x] Therapeutic Exercise   20163  [] Iontophoresis: 4 mg/mL Dexamethasone Sodium Phosphate  mAmin  32220   [x] Therapeutic Activity  19361 [] Vasopneumatic cold with compression  71979    [x] Gait Training   11453 [] Ultrasound   04574   [x] Neuromuscular Re-education  95690 [] Electrical Stimulation Unattended  19765   [] Manual Therapy  45521 [] Electrical Stimulation Attended  13323   [x] Instruction in HEP [] Lumbar/Cervical Traction  [id number]   [] Aquatic Therapy   [id number] [] Cold/hotpack    [] Massage   [id number]      [] Dry Needling, 1 or 2 muscles  53322   [] Biofeedback, first 15 minutes   73744  [] Biofeedback, additional 15 minutes   08554 [] Dry Needling, 3 or more muscles  67169      Patient Status:     [x] Continue per initial plan of care. [] Additional visits necessary. [] Other:     Requested Frequency/Duration: 2 times per week for 20 total treatments.            Treatment Charges: Mins Units   []  Modalities     []  Ther Exercise     []  Manual Therapy     [x]  Ther Activities 34 2   []  Aquatics     [x]  Neuromuscular 23 2   [] Vasocompression     [] Gait Training     [] Dry needling        [] 1 or 2 muscles        [] 3 or more muscles     []  Other     Total Treatment time 58 4     Time In: 1101     Time Out: 1588    Electronically signed by:  Fred Meadows, PT

## 2023-02-08 NOTE — PROGRESS NOTES
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 HPI:     HPI  Patient with B paraparesis due to lumbar spinal cord compression who had acute inpatient rehabilitation admission from 12/15/22 - 12/30/22. He has been performing outpatient therapy. His wife is providing care at home. He is ambulating with rollator. He reports a fall approximately once weekly. He reports moderate back pain, usually worse in the morning. He has been using his post-op pain medication prn. Neurology note from 1/30/23 with Jackson Morin is reviewed. She continued current doses of Sinemet. Recommended he continue to charge his DBS twice weekly and continued current settings. She added donepezil for impaired memory/processing.      Past Medical History:   Diagnosis Date    Anxiety     Asthma     Atrial fibrillation (HCC)     Back pain     BPH (benign prostatic hyperplasia)     Dental disease     Depression     Diarrhea     MALONE (dyspnea on exertion)     Frequent falls     GERD (gastroesophageal reflux disease)     Memory loss     Obesity     Panic disorder     Parkinson's disease ([address]     Testicular cancer ([address]     Visual impairment       Past Surgical History:   Procedure Laterality Date    BRAIN SURGERY      Neurostimulator implant for Parkinson's    BROW LIFT      CARPAL TUNNEL RELEASE Right     CHOLECYSTECTOMY      COLONOSCOPY      CYSTOSCOPY      HERNIA REPAIR      LUMBAR LAMINECTOMY      LYMPH NODE DISSECTION      ORCHIECTOMY      SHOULDER ARTHROSCOPY Right     TOTAL KNEE ARTHROPLASTY Bilateral     TURP      WRIST SURGERY Left     cyst removal     Family History   Problem Relation Age of Onset    Other Mother     Heart Failure Father     Other Sister     Behavior Problems Sister     No Known Problems Brother      Social History     Socioeconomic History    Marital status:    Tobacco Use    Smoking status: Never    Smokeless tobacco: Never       Current Outpatient Medications   Medication Sig Dispense Refill    escitalopram (LEXAPRO) 20 MG tablet Take 1 tablet by mouth daily 30 tablet 3    carbidopa-levodopa (SINEMET)  MG per tablet Take 1 tablet by mouth 2 times daily 90 tablet 3    carbidopa-levodopa (SINEMET CR)  MG per extended release tablet Take 1 tablet by mouth 2 times daily 60 tablet 0    Opicapone (ONGENTYS) 50 MG CAPS Take 50 mg by mouth at bedtime 30 capsule 0    QUEtiapine (SEROQUEL) 25 MG tablet Take 1 tablet by mouth nightly 60 tablet 0    tiZANidine (ZANAFLEX) 2 MG tablet Take 1 tablet by mouth every 8 hours as needed (insomnia) 10 tablet 0    mirabegron (MYRBETRIQ) 50 MG TB24 Take 50 mg by mouth nightly 30 tablet 0    warfarin (COUMADIN) 4 MG tablet Take 1 tablet by mouth daily 30 tablet 0    diphenhydrAMINE-zinc acetate 2-0.1 % cream Apply topically 3 times daily as needed. No current facility-administered medications for this visit. No Known Allergies    Subjective:      Review of Systems  Constitutional: Negative for fever, chills and unexpected weight change. HENT: Negative for trouble swallowing. Respiratory: Negative for cough and shortness of breath. Cardiovascular: Negative for chest pain. Endocrine: Negative for polyuria. Genitourinary: Negative for dysuria, urgency, frequency, incontinence and difficulty urinating. Gastrointestinal: Negative for constipation or diarrhea. Musculoskeletal: Positive for arthralgias. Neurological: Negative for headaches, numbness, or tingling. Psychiatric: Negative for depressed mood or anxiety.        Objective:     Physical Exam  /84   Temp 97.6 °F (36.4 °C)   Ht 6' 2\" (1.88 m)   Wt 240 lb (108.9 kg)   BMI 30.81 kg/m²   Constitutional: He appears well-developed and well-nourished. In no distress. HEENT: NCAT, PERRL, EOMI. Mucous membranes pink and moist.  Pulmonary/Chest: Respirations WNL and unlabored. MSK: Functional ROM all extremities with tremor. Strength 5/5 key muscles BLEs  Neurological: He is alert and oriented to person, place, and time. DTRs 2+ and symmetric. Resting tremor. Dyskinetic ambulation and gait with knees in valgus. Significantly poor balance on transfer and turns. Skin: Skin is warm dry and intact with good turgor. Psychiatric: He has a normal mood and affect. His behavior is normal. Thought content normal.   Medical assistant note and vitals for today's encounter reviewed. Diagnostic Studies:      Ref Range & Units 5 d ago   Protime 9.8 - 13.2 sec 35.9 High     Inr 0.8 - 1.1 3.1 High         Assessment:       Diagnosis Orders   1. Cord compression ([facility]        2. Chronic bilateral low back pain without sciatica        3. Parkinson disease ([facility]             Plan:      Continuing with outpatient PT. Has all necessary equipment at home. Educated him and wife on significant safety concerns regarding frequent falls while on Coumadin. Encouraged him to have a low threshold for using wheelchair for safe mobility due to poor balance. No orders of the defined types were placed in this encounter. No orders of the defined types were placed in this encounter. Return in about 3 months (around 5/8/2023). Electronically signedby Molly Mendes MD on 2/8/2023 at 3:45 PM.     Please note that this chartwas generated using voice recognition Dragon dictation software. Although everyeffort was made to ensure the accuracy of this automated transcription, some errorsin transcription may have occurred.

## 2023-02-10 ENCOUNTER — HOSPITAL ENCOUNTER (OUTPATIENT)
Dept: PHYSICAL THERAPY | Age: 68
Setting detail: THERAPIES SERIES
Discharge: HOME | End: 2023-02-10
Payer: MEDICARE

## 2023-02-10 PROCEDURE — 97112 NEUROMUSCULAR REEDUCATION: CPT

## 2023-02-10 NOTE — FLOWSHEET NOTE
[address] [facility] Outpatient Physical Therapy   [address]   Phone: [phone]   Fax: [phone]    Physical Therapy Daily Treatment Note/PROGRESS NOTE       Date:  2/10/2023  Patient Name:  Kimani Mir    :  1955  MRN: [id number]  Physician: Mahnaz Rice MD                               Insurance: [address] - based on [place] (track cap)   Medical Diagnosis:   G95.20 (ICD-10-CM) - Cord compression ([facility]   G20 (ICD-10-CM) - Parkinson disease ([facility]                 Rehab Codes: Z74.0 reduced mobility, R26.89 gait abnormality, R 29.3 abnormal posture, R27.8 lack of coordination   Date of symptom onset: 22 -DOS; Parkinson's 15+ years   Next 's appt. : 23 - Dr Kendell Felty   Visit# / total visits: 10/20  Cancels/No Shows: 0/0      Precautions: PD with cognitive deficits, impulsive, HIGH FALL RISK     Subjective:  Pt arrives with rollator. Pt notes he fell last night backwards when he was pulling things out of a cupboard. Pt notes he was able to arise on his own. He notes is better able to get up and down off the couch and various surfaces with greater ease. He has seen neurology and PMR with no changes to care at this time.      Pain:  [x] Yes  [] No Location: lumbar   Pain Rating: (0-10 scale) 1-2/10  Pain altered Tx:  [x] No  [] Yes  Action:  Comments:     Objective:  INTERVENTIONS  INTERVENTIONS  Reps/ Time Weight/ Level Completed  Today Comments          EXERCISES        Transfer training     TOTAL TIME    Sit to stands  x10   From chair with with hands on ball to facilitate forward weight translation    Sit to stand with moving rings  x12   UE support from chair, then moves x2 rings across arc with reaching cross body    Squat + stand with moving cones  20x   Squats then grabs cone to move laterally to target as he stands    Sit to stand with stopping ball  10x   Once in standing ball rolled at him and he stops the ball for reactive & anticipatory balance    Sit to stand with forward step to target 2x6 ea LE step   CGA to Kyler    Education to align to chair surface & hand placement when sitting     Completed throughout session -- allowed increased time to problem solve and gave cues as needed           Stretches        HS stretch on step 2x30'' ea   Assist given to keep foot in DF and knee extended   DF stretch on slant board  2x30s              Strength        TKE 2x15 ea green     Heel/toe raises 2x20             BALANCE       Low to high rotational ball passes  15x ea   In standing -- limited tolerance due to knee discomfort   Standing Ball tosses 2x20  x    Standing Ball bounces  2x20   x    Step to target requiring 90 deg trun for foot placement  2x10 ea BUE support  Using green theradisc and pt had to orient his foot to fit fully on this    90 deg turns with visual cues  10x ea BUE support   Start on dot and then step feet to black lines that follows for a 90 deg turn    OH lifts in front of chair 10x 4# ball     OH lifts while at wall 10x 4# ball     Cone from [facility] > to [place] hand off to opp UE > to table 8x ea   No UE support, tactile cueing at distal quads. Very fatigued with last set   High knee marches 5x ea   CGA, but required Mod-MaxA for  3x LOB correction   Step to targets  5x L 3x R  x 2/10 pt fatiguing easily   BITS  [id number]   Total time: 27minutes    Visual scan- user paced 20x2  x 1x reaching with LUE, 1x reaching with RUE      Complex array - sequence  30x2 #s, size 6 x 1x reaching with LUE, 1x reaching with RUE    Visual scan - user paced with screen low 20x2  x 1x reaching with LUE, 1x reaching with RUE    Chart- match 2 trials  x Center fixation with peripheral matching                 Gait:       Cone retrieval 6 cones x2      Cone weaves (4 cones) 2x4 laps   CGA to Kyler -- much more shuffling and festinating.  Unable to get to use VC to break    Retro Walking with rollator 20ft x2      Gait with RW walking in large square working on turns  X3 laps ea direction   Goal to reduce shuffling steps when turning    Walking over cones with rollator  10 min  CGA  Cues to approximate to cones vs taking too early of a step causing imbalance    Rollator with head turns vert & horizontal 1x50ft ea  x Inconsistent in keeping head moving    Walking with marches  2x75ft   Bradykinesia after about 3 steps    Cognitive dual tasks with walking  4x50ft   Slows walking but pt is more upright and controlled. -- discussed with wife post session 2/3          Treadmill Training  Total 1143 ft    With lite gait harness for safety, BUE on rails, facing Yok    Normal gait  5 min  0.6-1.0 mph   Cueing for attention to visual line on treadmill to reduce scissoring, cues to maintain Larger step lengths   Progressively increasing speed    Stepping over dots on treadmill  3 min   6 min  1.0mph   Initial bout pt having difficulty processing, stopped and then resumed   Much more success 2nd bout, not putting dots down until he maintains consistent long strides           Post Treadmill        Overground with rollator  250ft    Good step lengths, a little fast, no scissoring    Marching with rollator 2x75ft    Cues to slow down, marches lessen with reps and going too fast    Backwards with rollator  2x75ft    Seated rest break btwn; needing CGA as gets moving too fast having retro loss of balance           Other:    Home Program Education:   - 1/20: discussed with wife to encourage < 4 steps with 2WW when turning. Pt to work on this through the week. - 1/27: Discussed with wife to encourage pt to stop when hitting something and bringing attention to the environment to see what caused obstabcle to build insight and problem solving.   -2/1: educated pt and wife to move walker to EOB vs across the room for safety . - 2/8: educated wife on progress & discussed POC. Talked with her to stop pt when he becomes unsteady vs letting him continue with the task.        Specific Instructions for next treatment: work on cog dual tasking with walking, work on standing balance, sped up 3 way steps for balance reactions. Assessment: [x] Progressing toward goals. 2/10 Prior to session beginning, patient stood from his seat in the lobby and proceeded to walk while his feet were compromised by his walker and had stumbled but caught himself and continued walking. Continued with balance, standing tolerance and dynamic gait. Patient fatigues quickly with all static standing tasks and requires frequent cues to extend both knees and to improve his posture while standing. With patient fatiguing quickly, he required frequent rest breaks. During standing exercises, the patient was instructed to take several steps fwd away from the chair. To challenge his safety awareness, when the patient reported he needed to sit, he had to locate the chair behind him. Moderate cues were provided to encourage patient to remain upright until he felt the chair behind him to prevent from LOB backwards. With CGA, pt was able to step back to the chair with no LOB noted this session. Added chart matching to BITs activities to improve visual scanning. With dynamic gait, patient continues to demonstrate significant crossing of BLEs with horizontal head turns and when turning around, moderate cues were given to encourage pt to  his feet to prevent LOB. [] No change. [] Other:     [x] Patient would continue to benefit from skilled physical therapy services in order to: improve balance, increased postural alignment, improve motor planning and getting larger movements, and improve gait especially with turning to allow for safe and more IND mobility in the home. GOALS:      STG: (to be met in 10 treatments) -assessed 2/8/23  Pt will be able to ambulate with 2WW at gait speed of > 0.6m/s to demonstrate a good home ambulation gait speed to increase mobility in the home.      - 2/8: MET   Pt will be able to consistently stand with bilateral knees extended with 2WW to improve standing alignment.    -2/8: MET   Pt will be able to complete STS with supervision using AD and not needing assist to stabilize to improve safety with transfers in the home. - 2/8: MET    Pt will consistent be able to turn to align with chair minimal freezing and ability to self correct to be mor efficient with transfers.   -2/8 :Progressing -- still needing moderate cues    Pt will improve score on DORAN balance scale by >/=6 points (>28 pt) to improve overall balance stability and decrease fall risk with mobility. - 2/8: MET - 30/56 this date    Pt will improve time on 5xSTS to <20 seconds with use of RUE and consistently coming to full stand to decrease fall risk and increase functional capacity for mobility.    -2/8: MET but using L UE support  LTG: (to be met in 20 treatments)  Pt will be able to ambulate with a rollator for > 500ft and around obstacles with supervision to maximize mobility for around the home.     -2/8: Progressing    NEW GOAL 2/8: Pt will improve score on DORAN balance scale by >/=6 points (>36 pt) to improve overall balance stability and decrease fall risk with mobility. Pt will improve time on TUG to < 20 seconds with LRAD to be able to demonstrate decreased fall risk with home mobility.    -2/8: MET   3. NEW GOAL 2/8: Pt will be able to complete turns with navigation with no freezing 50% of time showing improved ability and decreased fall risk with navigating at home. 4. NEW GOAL 2/8: Pt will be able to self correct freezing episode in < 3 seconds to improve fluidity of mobility with less impact of freezing. Pt will improve score on Self-assessment of PD Disability scale to < 26/115 to demonstrate improvement in functional ADL tasks that are meaningful.    -2/8 : Will assess at later time.    Pt will be independent with home program addressing strength, flexibility and balance to maximize gains made in therapy to improve overall functional capacity for mobility.     -2/8: progressing -- continuing to update wife   Pt will report no falls for x6 weeks demonstrating improved safety with mobility and implementation of fall prevention strategies. -2/8: continuing to educate as pt still having falls. Recommending close supervision to SBA. Pt. Education:  [x] Yes  [] No  [] Reviewed Prior HEP/Ed  Method of Education: [x] Verbal  [x] Demo  [] Written  Edu: Posture, knee extension in standing, awareness with STS safety  Comprehension of Education:  [] Verbalizes understanding. [] Demonstrates understanding. [x] Needs review. [] Demonstrates/verbalizes HEP/Ed previously given. Plan: [x] Continue per plan of care.    [] Other:             Treatment Charges: Mins Units   []  Modalities     []  Ther Exercise     []  Manual Therapy     []  Ther Activities     []  Aquatics     [x]  Neuromuscular 47 3   [] Vasocompression     [] Gait Training     [] Dry needling        [] 1 or 2 muscles        [] 3 or more muscles     []  Other     Total Treatment time 47 3     Time In: 1106     Time Out: 0998    Electronically signed by:  Yulisa Castillo PTA

## 2023-02-15 ENCOUNTER — HOSPITAL ENCOUNTER (OUTPATIENT)
Dept: PHYSICAL THERAPY | Age: 68
Setting detail: THERAPIES SERIES
Discharge: HOME | End: 2023-02-15
Payer: MEDICARE

## 2023-02-15 PROCEDURE — 97112 NEUROMUSCULAR REEDUCATION: CPT

## 2023-02-15 NOTE — FLOWSHEET NOTE
[facility] Therapy   [address] [place] [address] [address]   Phone: [phone]   Fax: [phone]    Physical Therapy Daily Treatment Note      Date:  2/15/2023  Patient Name:  Taylor Angel    :  1955  MRN: [id number]  Physician: Tiarra Velasquez MD                               [facility] [place] on [place] ([place]   Medical Diagnosis:   G95.20 (ICD-10-CM) - Cord compression ([facility] [address]   G20 (ICD-10-CM) - Parkinson disease ([facility] [address]                 Rehab Codes: Z74.0 reduced mobility, R26.89 gait abnormality, R 29.3 abnormal posture, R27.8 lack of coordination   Date of symptom onset: 22 -DOS; Parkinson's 15+ years   Next 's appt. : 23 - Dr Gil Larson   Visit# / total visits:   Cancels/No Shows: 0/0      Precautions: PD with cognitive deficits, impulsive, HIGH FALL RISK     Subjective:  Patient arrives with rollator and reports no changes since last session and denies any falls. Patient states he would like to incorporate UE strengthening into his PT.      Pain:  [x] Yes  [] No Location: lumbar   Pain Rating: (0-10 scale) 1-2/10  Pain altered Tx:  [x] No  [] Yes  Action:  Comments:     Objective:  INTERVENTIONS  INTERVENTIONS  Reps/ Time Weight/ Level Completed  Today Comments          EXERCISES        Transfer training     TOTAL TIME    Sit to stands  x10   From chair with with hands on ball to facilitate forward weight translation    Sit to stand with moving rings  x12   UE support from chair, then moves x2 rings across arc with reaching cross body    Squat + stand with moving cones  20x   Squats then grabs cone to move laterally to target as he stands    Sit to stand with stopping ball  10x   Once in standing ball rolled at him and he stops the ball for reactive & anticipatory balance    Sit to stand with forward step to target 2x6 ea LE step   CGA to Kyler    Education to align to chair surface & hand placement when sitting     Completed throughout session -- allowed increased time to problem solve and gave cues as needed           Stretches        HS stretch on step 2x30'' ea   Assist given to keep foot in DF and knee extended   DF stretch on slant board  2x30s              Strength        TKE 2x15 ea green     Heel/toe raises 2x20             BALANCE       Low to high rotational ball passes  15x ea   In standing -- limited tolerance due to knee discomfort   Standing Ball tosses 2x20      Standing Ball bounces  2x20       Step to target requiring 90 deg trun for foot placement  2x10 ea BUE support  Using green theradisc and pt had to orient his foot to fit fully on this    90 deg turns with visual cues  10x ea BUE support   Start on dot and then step feet to black lines that follows for a 90 deg turn    OH lifts in front of chair 10x 4# ball     OH lifts while at wall 10x 4# ball     Cone from [facility] > to [place] hand off to opp UE > to table 8x ea   No UE support, tactile cueing at distal quads.  Very fatigued with last set   High knee marches 5x ea   CGA, but required Mod-MaxA for  3x LOB correction   Step to targets  5x L 3x R   2/10 pt fatiguing easily   Fwd Toe taps to half ball 10x2 ea  x CGA, 2x requiring modA to correct LOB   Lateral toe taps to half ball 10x2 ea  x           BITS  [id number]   Total time: 27minutes    Visual scan- user paced 20x2  x 1x reaching with LUE, 1x reaching with RUE      Complex array - sequence ON FOAM 30x2 #s, size 6 x 1x reaching with LUE, 1x reaching with RUE    Visual scan - user paced with screen low 20x2  x 1x reaching with LUE, 1x reaching with RUE    Chart- match 2 trials   Center fixation with peripheral matching   Visual scan- user paced WITH activity 20x2  x Pt to tap screen with RUE then tap therapist hand on opposite side with LUE as quickly as possible to challenge reactive balance - Then LUE to screen and RUE to therapist          Gait:       Cone retrieval 6 cones x2      Cone weaves (4 cones) 2x4 laps   CGA to Kyler -- much more shuffling and festinating. Unable to get to use VC to break    Retro Walking with rollator 20ft x2      Gait with RW walking in large square working on turns  X3 laps ea direction  x Goal to reduce shuffling steps when turning    Walking over cones with rollator  10 min  CGA  Cues to approximate to cones vs taking too early of a step causing imbalance    Rollator with head turns vert & horizontal 1x50ft ea  x Inconsistent in keeping head moving    Walking with marches  2x75ft   Bradykinesia after about 3 steps    Cognitive dual tasks with walking  4x50ft   Slows walking but pt is more upright and controlled. -- discussed with wife post session 2/3   NO AD fwd ambulation with turns/passing ball from RUE to L UE 4x15ft  x CGA    Fwd ambulation with chest press 4x20ft 5# bar x    Fwd ambulation with OH press 4x20ft 5# bar x    Fwd ambulation with rowing simulation 4x20ft 5# bar x Rest break given after 2 sets          Treadmill Training  Total 1143 ft    With lite gait harness for safety, BUE on rails, facing [place]    Normal gait  5 min  0.6-1.0 mph   Cueing for attention to visual line on treadmill to reduce scissoring, cues to maintain Larger step lengths   Progressively increasing speed    Stepping over dots on treadmill  3 min   6 min  1.0mph   Initial bout pt having difficulty processing, stopped and then resumed   Much more success 2nd bout, not putting dots down until he maintains consistent long strides           Post Treadmill        Overground with rollator  250ft    Good step lengths, a little fast, no scissoring    Marching with rollator 2x75ft    Cues to slow down, marches lessen with reps and going too fast    Backwards with rollator  2x75ft    Seated rest break btwn; needing CGA as gets moving too fast having retro loss of balance           Other:    Home Program Education:   - 1/20: discussed with wife to encourage < 4 steps with 2WW when turning. Pt to work on this through the week.    - 1/27: Discussed with wife to encourage pt to stop when hitting something and bringing attention to the environment to see what caused obstabcle to build insight and problem solving.   -2/1: educated pt and wife to move walker to EOB vs across the room for safety . - 2/8: educated wife on progress & discussed POC. Talked with her to stop pt when he becomes unsteady vs letting him continue with the task. Specific Instructions for next treatment: work on cog dual tasking with walking, work on standing balance, sped up 3 way steps for balance reactions. Assessment: [x] Progressing toward goals. 2/15: Focused session around reactive balance and dual tasking with walking. Added activity with BITS as documented above for dual tasking in static standing position. Patient would have occasional confusion with sequencing, but able to correct with minimal cueing. No LOB with static standing but fatigued quickly, requiring short seated rest breaks. Frequent cues given to patient when having a heavy posterior lean set in and educated pt to not continue with activity if COG is altered. Patient verbalized understanding but still required some cues throughout session. [] No change. [] Other:     [x] Patient would continue to benefit from skilled physical therapy services in order to: improve balance, increased postural alignment, improve motor planning and getting larger movements, and improve gait especially with turning to allow for safe and more IND mobility in the home. GOALS:      STG: (to be met in 10 treatments) -assessed 2/8/23  Pt will be able to ambulate with 2WW at gait speed of > 0.6m/s to demonstrate a good home ambulation gait speed to increase mobility in the home.      - 2/8: MET   Pt will be able to consistently stand with bilateral knees extended with 2WW to improve standing alignment.    -2/8: MET   Pt will be able to complete STS with supervision using AD and not needing assist to stabilize to improve safety with transfers in the home. - 2/8: MET    Pt will consistent be able to turn to align with chair minimal freezing and ability to self correct to be mor efficient with transfers.   -2/8 :Progressing -- still needing moderate cues    Pt will improve score on DORAN balance scale by >/=6 points (>28 pt) to improve overall balance stability and decrease fall risk with mobility. - 2/8: MET - 30/56 this date    Pt will improve time on 5xSTS to <20 seconds with use of RUE and consistently coming to full stand to decrease fall risk and increase functional capacity for mobility.    -2/8: MET but using L UE support  LTG: (to be met in 20 treatments)  Pt will be able to ambulate with a rollator for > 500ft and around obstacles with supervision to maximize mobility for around the home.     -2/8: Progressing    NEW GOAL 2/8: Pt will improve score on DORAN balance scale by >/=6 points (>36 pt) to improve overall balance stability and decrease fall risk with mobility. Pt will improve time on TUG to < 20 seconds with LRAD to be able to demonstrate decreased fall risk with home mobility.    -2/8: MET   3. NEW GOAL 2/8: Pt will be able to complete turns with navigation with no freezing 50% of time showing improved ability and decreased fall risk with navigating at home. 4. NEW GOAL 2/8: Pt will be able to self correct freezing episode in < 3 seconds to improve fluidity of mobility with less impact of freezing. Pt will improve score on Self-assessment of PD Disability scale to < 26/115 to demonstrate improvement in functional ADL tasks that are meaningful.    -2/8 : Will assess at later time.    Pt will be independent with home program addressing strength, flexibility and balance to maximize gains made in therapy to improve overall functional capacity for mobility.     -2/8: progressing -- continuing to update wife   Pt will report no falls for x6 weeks demonstrating improved safety with mobility and implementation of fall prevention strategies. -2/8: continuing to educate as pt still having falls. Recommending close supervision to SBA. Pt. Education:  [x] Yes  [] No  [] Reviewed Prior HEP/Ed  Method of Education: [x] Verbal  [x] Demo  [] Written  Edu: Posture, knee extension in standing, awareness with STS safety  Comprehension of Education:  [] Verbalizes understanding. [] Demonstrates understanding. [x] Needs review. [] Demonstrates/verbalizes HEP/Ed previously given. Plan: [x] Continue per plan of care.    [] Other:             Treatment Charges: Mins Units   []  Modalities     []  Ther Exercise     []  Manual Therapy     []  Ther Activities     []  Aquatics     [x]  Neuromuscular 55 4   [] Vasocompression     [] Gait Training     [] Dry needling        [] 1 or 2 muscles        [] 3 or more muscles     []  Other     Total Treatment time 55 4     Time In: 1200     Time Out: 6892    Electronically signed by:  Silvia Benjamin PTA

## 2023-02-17 ENCOUNTER — HOSPITAL ENCOUNTER (OUTPATIENT)
Dept: PHYSICAL THERAPY | Age: 68
Setting detail: THERAPIES SERIES
Discharge: HOME | End: 2023-02-17
Payer: MEDICARE

## 2023-02-17 PROCEDURE — 97112 NEUROMUSCULAR REEDUCATION: CPT

## 2023-02-17 NOTE — FLOWSHEET NOTE
[facility]   [address]   Phone: [phone]   Fax: [phone]    Physical Therapy Daily Treatment Note      Date:  2023  Patient Name:  Alysha Domínguez    :  1955  MRN: [id number]  Physician: Jono Ny MD                               [place]: [address] [place]   Medical Diagnosis:   G95.20 (ICD-10-CM) - Cord compression ([facility] [address]   G20 (ICD-10-CM) - Parkinson disease ([facility] [address]                 Rehab Codes: Z74.0 reduced mobility, R26.89 gait abnormality, R 29.3 abnormal posture, R27.8 lack of coordination   Date of symptom onset: 22 -DOS; Parkinson's 15+ years   Next 's appt. : 23 - Dr Frederick Kapoor   Visit# / total visits:   Cancels/No Shows: 0/0      Precautions: PD with cognitive deficits, impulsive, HIGH FALL RISK     Subjective:    Patient and wife reporting knee buckling yesterday and today, and having more difficulty going from sitting to standing due to leg weakness. Patient states his low back is also bothering him. Patient also reporting he is feeling fatigued more lately but denies feeling sick.    Pain:  [x] Yes  [] No Location: lumbar   Pain Rating: (0-10 scale) 5-6/10  Pain altered Tx:  [x] No  [] Yes  Action:  Comments:     Objective:  INTERVENTIONS  INTERVENTIONS  Reps/ Time Weight/ Level Completed  Today Comments          EXERCISES        Transfer training     TOTAL TIME    Sit to stands  x10   From chair with with hands on ball to facilitate forward weight translation    Sit to stand with moving rings  x12   UE support from chair, then moves x2 rings across arc with reaching cross body    Squat + stand with moving cones  20x   Squats then grabs cone to move laterally to target as he stands    Sit to stand with stopping ball  10x   Once in standing ball rolled at him and he stops the ball for reactive & anticipatory balance    Sit to stand with forward step to target 2x10 ea LE step  x CGA to Kyler    Education to align to chair surface & hand placement when sitting     Completed throughout session -- allowed increased time to problem solve and gave cues as needed           Stretches        HS stretch on step 2x30'' ea   Assist given to keep foot in DF and knee extended   DF stretch on slant board  2x30s              Strength        TKE 2x15 ea green     Heel/toe raises 2x20  x           BALANCE       Step to target with partial lunge 5x2 ea  x Orange dot, CGA-modA to correct lateral/posterior LOB   Low to high rotational ball passes  15x ea   In standing -- limited tolerance due to knee discomfort   Standing Ball tosses 2x20      Standing Ball bounces  2x20       Step to target requiring 90 deg trun for foot placement  2x10 ea BUE support  Using green theradisc and pt had to orient his foot to fit fully on this    90 deg turns with visual cues  10x ea BUE support   Start on dot and then step feet to black lines that follows for a 90 deg turn    OH lifts in front of chair 10x 4# ball     OH lifts while at wall 10x 4# ball     Cone from [facility] > to [place] hand off to opp UE > to table 8x ea   No UE support, tactile cueing at distal quads.  Very fatigued with last set   High knee marches 5x ea   CGA, but required Mod-MaxA for  3x LOB correction   Step to targets  5x L 3x R   2/10 pt fatiguing easily   Fwd Toe taps to half ball 10x2 ea   CGA, 2x requiring modA to correct LOB   Lateral toe taps to half ball 10x2 ea             BITS  [id number]   Total time: 27minutes    Visual scan- user paced 20x2   1x reaching with LUE, 1x reaching with RUE      Complex array - sequence ON FOAM 30x2 #s, size 6  1x reaching with LUE, 1x reaching with RUE    Visual scan - user paced with screen low 20x2   1x reaching with LUE, 1x reaching with RUE    Chart- match 2 trials   Center fixation with peripheral matching   Visual scan- user paced WITH activity 20x2 ea  x Pt to tap screen with RUE then tap therapist hand on opposite side with LUE as quickly as possible to challenge reactive balance - Then LUE to screen and RUE to therapist - Verbal cues such as \"L screen, R hand\" increased accuracy from 80% to 95%. Gait:       Cone retrieval 6 cones x2      Cone weaves (4 cones) 2x4 laps   CGA to Kyler -- much more shuffling and festinating. Unable to get to use VC to break    Retro Walking with rollator 20ft x4  x    Gait with RW walking in large square working on turns  X3 laps ea direction  x Goal to reduce shuffling steps when turning    Walking over cones with rollator  10 min  CGA  Cues to approximate to cones vs taking too early of a step causing imbalance    Rollator with head turns vert & horizontal 1x50ft ea   Inconsistent in keeping head moving    Walking with marches  2x75ft   Bradykinesia after about 3 steps    Cognitive dual tasks with walking  4x50ft   Slows walking but pt is more upright and controlled.  -- discussed with wife post session 2/3   NO AD fwd ambulation with turns/passing ball from RUE to L UE 4x15ft   CGA    Fwd ambulation with chest press 4x20ft 5# bar x    Fwd ambulation with OH press 4x20ft 5# bar x    Fwd ambulation with rowing simulation 4x20ft 5# bar  Rest break given after 2 sets   Slow ambulation No AD 4x20ft  x Cues for heel strike as pt would strike toes first.           Treadmill Training  Total 1143 ft    With lite gait harness for safety, BUE on rails, facing [place]    Normal gait  5 min  0.6-1.0 mph   Cueing for attention to visual line on treadmill to reduce scissoring, cues to maintain Larger step lengths   Progressively increasing speed    Stepping over dots on treadmill  3 min   6 min  1.0mph   Initial bout pt having difficulty processing, stopped and then resumed   Much more success 2nd bout, not putting dots down until he maintains consistent long strides           Post Treadmill        Overground with rollator  250ft    Good step lengths, a little fast, no scissoring    Marching with rollator 2x75ft    Cues to slow down, marches lessen with reps and going too fast    Backwards with rollator  2x75ft    Seated rest break btwn; needing CGA as gets moving too fast having retro loss of balance           Other:  Vitals after c/o chest pain:   Blood pressure: 143/99mmHg    Heart Rate: 66bpm    SpO2: 96%    Home Program Education:   - 1/20: discussed with wife to encourage < 4 steps with 2WW when turning. Pt to work on this through the week. - 1/27: Discussed with wife to encourage pt to stop when hitting something and bringing attention to the environment to see what caused obstabcle to build insight and problem solving.   -2/1: educated pt and wife to move walker to EOB vs across the room for safety . - 2/8: educated wife on progress & discussed POC. Talked with her to stop pt when he becomes unsteady vs letting him continue with the task. Specific Instructions for next treatment: work on cog dual tasking with walking, work on standing balance, sped up 3 way steps for balance reactions. Assessment: [x] Progressing toward goals. 2/17: Began session with BITS activity for multi-step activity challenging cognition and creating large movements with trunk rotation and contralateral reaching. Added step to dots with mini lunge for quad strengthening and stability challenge. Patient remained CGA but required occasional min-modA for LOB correction. Patient completed ambulation with chest press and demonstrated impulsivity with descending to chair to rest. Patient then grabbed his chest and noted sudden chest pain and stated \"I feel like my A-Fib is acting up. \" Patient had SOB from ambulating and vitals were taken and documented above. Patient denied chest pain lasting greater than 20 seconds and was gone by the time vitals were completed. Primary PT notified and checked in with patient. Plan was discussed and pt agreed to continue with PT as long as symptoms did not reappear.  Patient consistently denied production of any symptoms throughout remainder of session. At end of session, wife notified of incident and agreed to monitor patient and discuss with doctor. [] No change. [] Other:     [x] Patient would continue to benefit from skilled physical therapy services in order to: improve balance, increased postural alignment, improve motor planning and getting larger movements, and improve gait especially with turning to allow for safe and more IND mobility in the home. GOALS:      STG: (to be met in 10 treatments) -assessed 2/8/23  Pt will be able to ambulate with 2WW at gait speed of > 0.6m/s to demonstrate a good home ambulation gait speed to increase mobility in the home. - 2/8: MET   Pt will be able to consistently stand with bilateral knees extended with 2WW to improve standing alignment.    -2/8: MET   Pt will be able to complete STS with supervision using AD and not needing assist to stabilize to improve safety with transfers in the home. - 2/8: MET    Pt will consistent be able to turn to align with chair minimal freezing and ability to self correct to be mor efficient with transfers.   -2/8 :Progressing -- still needing moderate cues    Pt will improve score on DORAN balance scale by >/=6 points (>28 pt) to improve overall balance stability and decrease fall risk with mobility. - 2/8: MET - 30/56 this date    Pt will improve time on 5xSTS to <20 seconds with use of RUE and consistently coming to full stand to decrease fall risk and increase functional capacity for mobility.    -2/8: MET but using L UE support  LTG: (to be met in 20 treatments)  Pt will be able to ambulate with a rollator for > 500ft and around obstacles with supervision to maximize mobility for around the home.     -2/8: Progressing    NEW GOAL 2/8: Pt will improve score on DORAN balance scale by >/=6 points (>36 pt) to improve overall balance stability and decrease fall risk with mobility.    Pt will improve time on TUG to < 20 seconds with LRAD to be able to demonstrate decreased fall risk with home mobility.    -2/8: MET   3. NEW GOAL 2/8: Pt will be able to complete turns with navigation with no freezing 50% of time showing improved ability and decreased fall risk with navigating at home. 4. NEW GOAL 2/8: Pt will be able to self correct freezing episode in < 3 seconds to improve fluidity of mobility with less impact of freezing. Pt will improve score on Self-assessment of PD Disability scale to < 26/115 to demonstrate improvement in functional ADL tasks that are meaningful.    -2/8 : Will assess at later time. Pt will be independent with home program addressing strength, flexibility and balance to maximize gains made in therapy to improve overall functional capacity for mobility.     -2/8: progressing -- continuing to update wife   Pt will report no falls for x6 weeks demonstrating improved safety with mobility and implementation of fall prevention strategies. -2/8: continuing to educate as pt still having falls. Recommending close supervision to SBA. Pt. Education:  [x] Yes  [] No  [] Reviewed Prior HEP/Ed  Method of Education: [x] Verbal  [] Demo  [] Written  Edu: monitoring symptoms, safe STS  Comprehension of Education:  [x] Verbalizes understanding. [] Demonstrates understanding. [] Needs review. [] Demonstrates/verbalizes HEP/Ed previously given. Plan: [x] Continue per plan of care.    [] Other:    Treatment Charges: Mins Units   []  Modalities     []  Ther Exercise     []  Manual Therapy     []  Ther Activities     []  Aquatics     [x]  Neuromuscular 45 3   [] Vasocompression     [] Gait Training     [] Dry needling        [] 1 or 2 muscles        [] 3 or more muscles     []  Other     Total Treatment time 45 3     Time In: 11:30am     Time Out: 12:15pm    Electronically signed by:  Be Butler PTA

## 2023-02-22 ENCOUNTER — HOSPITAL ENCOUNTER (OUTPATIENT)
Dept: PHYSICAL THERAPY | Age: 68
Setting detail: THERAPIES SERIES
Discharge: HOME | End: 2023-02-22
Payer: MEDICARE

## 2023-02-22 NOTE — CARE COORDINATION
[x] [facility] ([facility]) - [facility] & Therapy  [address]  [place]: [phone]   F: [phone]     Physical Therapy Cancel/No Show note    Date: 2023  Patient: Maddy Ogden  : 1955  MRN: [id number]    Visit Count:   Cancels/No Shows to date:     For today's appointment patient:    [x]  Cancelled    [] Rescheduled appointment    [] No-show     Reason given by patient:    [x]  Patient ill- per , pt called to cancel due to being ill    []  Conflicting appointment    [] No transportation      [] Conflict with work    [] No reason given    [] Weather related    [] [id number]    [] Other:      Comments:        [] Next appointment was confirmed    Electronically signed by: Aysha Shoemaker PTA

## 2023-02-24 ENCOUNTER — HOSPITAL ENCOUNTER (OUTPATIENT)
Dept: PHYSICAL THERAPY | Age: 68
Setting detail: THERAPIES SERIES
Discharge: HOME | End: 2023-02-24
Payer: MEDICARE

## 2023-02-24 PROCEDURE — 97112 NEUROMUSCULAR REEDUCATION: CPT

## 2023-02-24 PROCEDURE — 97116 GAIT TRAINING THERAPY: CPT

## 2023-02-24 PROCEDURE — 97110 THERAPEUTIC EXERCISES: CPT

## 2023-02-24 NOTE — FLOWSHEET NOTE
[facility]   [address]   Phone: [phone]   Fax: [phone]    Physical Therapy Daily Treatment Note      Date:  2023  Patient Name:  Torin Cm    :  1955  MRN: [id number]  Physician: Bambi Tripp MD                               Insurance: [address] [place]   Medical Diagnosis:   G95.20 (ICD-10-CM) - Cord compression ([facility] [address]   G20 (ICD-10-CM) - Parkinson disease ([facility] [address]                 Rehab Codes: Z74.0 reduced mobility, R26.89 gait abnormality, R 29.3 abnormal posture, R27.8 lack of coordination   Date of symptom onset: 22 -DOS; Parkinson's 15+ years   Next 's appt. : 23 - Dr Marco A Burch   Visit# / total visits:   Cancels/No Shows: 1/0      Precautions: PD with cognitive deficits, impulsive, HIGH FALL RISK     Subjective:    Patient arrives with rollator. He notes he missed last session due to having pain in his stomach. He is feeling better today. He notes that his head nodding is increasing. Therapist noting more writhing motion in the RLE.        Pain:  [] Yes  [x] No Location: denies  Pain Rating: (0-10 scale) denies /10  Pain altered Tx:  [x] No  [] Yes  Action:  Comments:     Objective:  INTERVENTIONS  INTERVENTIONS  Reps/ Time Weight/ Level Completed  Today Comments          EXERCISES        Transfer training     TOTAL TIME    Sit to stands  x10   SBA, cues to come more forward as he is on his heels    Sit to stand with moving rings  x12   UE support from chair, then moves x2 rings across arc with reaching cross body    Squat + stand with moving cones  20x   Squats then grabs cone to move laterally to target as he stands    Sit to stand with stopping ball  10x   Once in standing ball rolled at him and he stops the ball for reactive & anticipatory balance    Sit to stand with forward step to target 2x10 ea LE step  x CGA to Kyler    Education to align to chair surface & hand placement when sitting     Completed throughout session -- allowed increased time to problem solve and gave cues as needed           Stretches        HS stretch on step 2x30'' ea   Assist given to keep foot in DF and knee extended   DF stretch on slant board  2x30s              Strength     BUE support to focus on strength    TKE 2x15 ea green     Heel/toe raises 2x20  x    Mini squats  2x15  x Therapist behind to cue for squat depth    Step + lunge to target fwd 2x10 ea  x BUE support    Step + lunge to target lateral 2x10 ea  x                  BALANCE       Low to high rotational ball passes  15x ea   In standing -- limited tolerance due to knee discomfort   Standing Ball tosses 2x20      Standing Ball bounces  2x20       Standing overhead lifts  10x2 4# ball x    Standing tracing large circles: Cw/CCW  10x2 4# ball  x    OH lifts while at wall 10x 4# ball     Cone from mat > to  hand off to opp UE > to table 8x ea   No UE support, tactile cueing at distal quads. Very fatigued with last set   High knee marches 5x ea   CGA, but required Mod-MaxA for  3x LOB correction   Step to targets  5x L 3x R   2/10 pt fatiguing easily   Fwd Toe taps to half ball 10x2 ea   CGA, 2x requiring modA to correct LOB   Lateral toe taps to half ball 10x2 ea             BITS  [id number]   Total time: 27minutes    Visual scan- user paced 20x2   1x reaching with LUE, 1x reaching with RUE      Complex array - sequence ON FOAM 30x2 #s, size 6  1x reaching with LUE, 1x reaching with RUE    Visual scan - user paced with screen low 20x2   1x reaching with LUE, 1x reaching with RUE    Chart- match 2 trials   Center fixation with peripheral matching   Visual scan- user paced WITH activity 20x2 ea   Pt to tap screen with RUE then tap therapist hand on opposite side with LUE as quickly as possible to challenge reactive balance - Then LUE to screen and RUE to therapist - Verbal cues such as \"L screen, R hand\" increased accuracy from 80% to 95%. Gait:       Walking rollator  2x30ft   x    Walking marches with rollator  4x30ft   x    Weaving around obstacles  5 min   x Mild to moderate freezing    Walking btwn chairs x5  x Walks with rollator to chair  and has to align; cues to weight shift to break freezing    Cone retrieval 6 cones x2             Retro Walking with rollator 20ft x4      Gait with RW walking in large square working on turns  X3 laps ea direction   Goal to reduce shuffling steps when turning    Walking over cones with rollator  10 min  CGA  Cues to approximate to cones vs taking too early of a step causing imbalance    Rollator with head turns vert & horizontal 1x50ft ea   Inconsistent in keeping head moving    Cognitive dual tasks with walking  4x50ft   Slows walking but pt is more upright and controlled. -- discussed with wife post session 2/3   NO AD fwd ambulation with turns/passing ball from RUE to L UE 4x15ft   CGA    Fwd ambulation with chest press 4x20ft 5# bar     Fwd ambulation with OH press 4x20ft 5# bar     Fwd ambulation with rowing simulation 4x20ft 5# bar  Rest break given after 2 sets   Slow ambulation No AD 4x20ft   Cues for heel strike as pt would strike toes first.           Other:    Home Program Education:   - 1/20: discussed with wife to encourage < 4 steps with 2WW when turning. Pt to work on this through the week. - 1/27: Discussed with wife to encourage pt to stop when hitting something and bringing attention to the environment to see what caused obstabcle to build insight and problem solving.   -2/1: educated pt and wife to move walker to EOB vs across the room for safety . - 2/8: educated wife on progress & discussed POC. Talked with her to stop pt when he becomes unsteady vs letting him continue with the task. Specific Instructions for next treatment: Working mainly on balance       Assessment: [x] Progressing toward goals.   Upon arrival to session seeing more a writhing motion of the RLE than previously. Pt having more difficulty recalling what he feels he needs to work on. Decided to work on strength to be able to support upright more. [] No change. [] Other:     [x] Patient would continue to benefit from skilled physical therapy services in order to: improve balance, increased postural alignment, improve motor planning and getting larger movements, and improve gait especially with turning to allow for safe and more IND mobility in the home. GOALS:      STG: (to be met in 10 treatments) -assessed 2/8/23  Pt will be able to ambulate with 2WW at gait speed of > 0.6m/s to demonstrate a good home ambulation gait speed to increase mobility in the home. - 2/8: MET   Pt will be able to consistently stand with bilateral knees extended with 2WW to improve standing alignment.    -2/8: MET   Pt will be able to complete STS with supervision using AD and not needing assist to stabilize to improve safety with transfers in the home. - 2/8: MET    Pt will consistent be able to turn to align with chair minimal freezing and ability to self correct to be mor efficient with transfers.   -2/8 :Progressing -- still needing moderate cues    Pt will improve score on DORAN balance scale by >/=6 points (>28 pt) to improve overall balance stability and decrease fall risk with mobility. - 2/8: MET - 30/56 this date    Pt will improve time on 5xSTS to <20 seconds with use of RUE and consistently coming to full stand to decrease fall risk and increase functional capacity for mobility.    -2/8: MET but using L UE support  LTG: (to be met in 20 treatments)  Pt will be able to ambulate with a rollator for > 500ft and around obstacles with supervision to maximize mobility for around the home.     -2/8: Progressing    NEW GOAL 2/8: Pt will improve score on DORAN balance scale by >/=6 points (>36 pt) to improve overall balance stability and decrease fall risk with mobility.    Pt will improve time on TUG to < 20 seconds with LRAD to be able to demonstrate decreased fall risk with home mobility.    -2/8: MET   3. NEW GOAL 2/8: Pt will be able to complete turns with navigation with no freezing 50% of time showing improved ability and decreased fall risk with navigating at home. 4. NEW GOAL 2/8: Pt will be able to self correct freezing episode in < 3 seconds to improve fluidity of mobility with less impact of freezing. Pt will improve score on Self-assessment of PD Disability scale to < 26/115 to demonstrate improvement in functional ADL tasks that are meaningful.    -2/8 : Will assess at later time. Pt will be independent with home program addressing strength, flexibility and balance to maximize gains made in therapy to improve overall functional capacity for mobility.     -2/8: progressing -- continuing to update wife   Pt will report no falls for x6 weeks demonstrating improved safety with mobility and implementation of fall prevention strategies. -2/8: continuing to educate as pt still having falls. Recommending close supervision to SBA. Pt. Education:  [x] Yes  [] No  [] Reviewed Prior HEP/Ed  Method of Education: [x] Verbal  [] Demo  [] Written  Edu: monitoring symptoms, safe STS  Comprehension of Education:  [x] Verbalizes understanding. [] Demonstrates understanding. [] Needs review. [] Demonstrates/verbalizes HEP/Ed previously given. Plan: [x] Continue per plan of care.    [] Other:        Treatment Charges: Mins Units   []  Modalities     [x]  Ther Exercise 26 2   []  Manual Therapy     []  Ther Activities     []  Aquatics     [x]  Neuromuscular 10 1   [] Vasocompression     [x] Gait Training 23 1   [] Dry needling        [] 1 or 2 muscles        [] 3 or more muscles     []  Other     Total Treatment time 59 4     Time In: 12:01 pm     Time Out: 1300     Electronically signed by:  Yumiko Guo, PT

## 2023-02-27 ENCOUNTER — HOSPITAL ENCOUNTER (OUTPATIENT)
Dept: PHYSICAL THERAPY | Age: 68
Setting detail: THERAPIES SERIES
Discharge: HOME | End: 2023-02-27
Payer: MEDICARE

## 2023-02-27 PROCEDURE — 97112 NEUROMUSCULAR REEDUCATION: CPT

## 2023-02-27 PROCEDURE — 97110 THERAPEUTIC EXERCISES: CPT

## 2023-02-27 PROCEDURE — 97116 GAIT TRAINING THERAPY: CPT

## 2023-02-27 NOTE — FLOWSHEET NOTE
[facility]   [facility]   Phone: [phone]   Fax: [phone]    Physical Therapy Daily Treatment Note      Date:  2023  Patient Name:  Adeel Webb    :  1955  MRN: [id number]  Physician: Mirta Murphy MD                               [place]: [address] [place]   Medical Diagnosis:   G95.20 (ICD-10-CM) - Cord compression ([facility] [address]   G20 (ICD-10-CM) - Parkinson disease ([facility] [address]                 Rehab Codes: Z74.0 reduced mobility, R26.89 gait abnormality, R 29.3 abnormal posture, R27.8 lack of coordination   Date of symptom onset: 22 -DOS; Parkinson's 15+ years   Next 's appt. : 23 - Dr Flavio Gtz   Visit# / total visits:   Cancels/No Shows: 1/0      Precautions: PD with cognitive deficits, impulsive, HIGH FALL RISK     Subjective:    Patient arrives with rollator and states his low back is bothering him, but otherwise has unchanged symptoms since last visit. Patient also notes that he fell last week and was sore, which is why he canceled his appt. Patient states the fall occurred within his home and he tripped over a rug.    Pain:  [] Yes  [x] No Location: Low back  Pain Rating: (0-10 scale) 5/10  Pain altered Tx:  [x] No  [] Yes  Action:  Comments:       Objective:  INTERVENTIONS  INTERVENTIONS  Reps/ Time Weight/ Level Completed  Today Comments          EXERCISES        Transfer training     TOTAL TIME    Sit to stands  x10   SBA, cues to come more forward as he is on his heels    Sit to stand with moving rings  x12   UE support from chair, then moves x2 rings across arc with reaching cross body    Squat + stand with moving cones  20x   Squats then grabs cone to move laterally to target as he stands    Sit to stand with stopping ball  10x   Once in standing ball rolled at him and he stops the ball for reactive & anticipatory balance    Sit to stand with forward step to target 2x10 ea LE step  x CGA to Kyler    Education to align to chair surface & hand placement when sitting     Completed throughout session -- allowed increased time to problem solve and gave cues as needed           Stretches        HS stretch on step 2x30'' ea   Assist given to keep foot in DF and knee extended   DF stretch on slant board  2x30s              Strength     BUE support to focus on strength    TKE 2x15 ea green     Heel/toe raises 2x20  x    Mini squats  3x10  x Therapist behind to cue for squat depth    Step + lunge to target fwd 2x15 ea 2# aw x BUE support    Step + lunge to target lateral 2x10 ea 2# aw x    Marching 20x ea 2# aw x           BALANCE       Low to high rotational ball passes  15x ea   In standing -- limited tolerance due to knee discomfort   Standing Ball tosses 2x20      Standing Ball bounces  2x20       Standing overhead lifts  10x2 4# ball x    Standing tracing large circles: Cw/CCW  10x2 4# ball  x    OH lifts while at wall 10x 4# ball     Cone from [facility] > to [place] hand off to opp UE > to table 8x ea   No UE support, tactile cueing at distal quads.  Very fatigued with last set   High knee marches 5x ea   CGA, but required Mod-MaxA for  3x LOB correction   Step to targets  5x L 3x R   2/10 pt fatiguing easily   Fwd Toe taps to half ball 10x2 ea   CGA, 2x requiring modA to correct LOB   Lateral toe taps to half ball 10x2 ea             BITS  [id number]   Total time: 27minutes    Visual scan- user paced 20x2   1x reaching with LUE, 1x reaching with RUE      Complex array - sequence ON FOAM 30x2 #s, size 6  1x reaching with LUE, 1x reaching with RUE    Visual scan - user paced with screen low 20x2   1x reaching with LUE, 1x reaching with RUE    Chart- match 2 trials   Center fixation with peripheral matching   Visual scan- user paced WITH activity 20x2 ea   Pt to tap screen with RUE then tap therapist hand on opposite side with LUE as quickly as possible to challenge reactive balance - Then LUE to screen and RUE to therapist - Verbal cues such as \"L screen, R hand\" increased accuracy from 80% to 95%. Gait:       Walking rollator  4x75ft   x    Walking marches with rollator  4x30ft   x    Weaving around obstacles  5 min    Mild to moderate freezing    Walking btwn chairs x5   Walks with rollator to chair  and has to align; cues to weight shift to break freezing    Cone retrieval 6 cones x2             Retro Walking with rollator 30ft x4  x    Gait with RW walking in large square working on turns  X3 laps ea direction   Goal to reduce shuffling steps when turning    Walking over cones with rollator  10 min  CGA  Cues to approximate to cones vs taking too early of a step causing imbalance    Rollator with head turns vert & horizontal 2x65ft ea  x Inconsistent in keeping head moving    Cognitive dual tasks with walking  4x50ft   Slows walking but pt is more upright and controlled. -- discussed with wife post session 2/3   NO AD fwd ambulation with turns/passing ball from RUE to L UE 4x15ft   CGA    Fwd ambulation with chest press 4x20ft 5# bar     Fwd ambulation with OH press 4x20ft 5# bar     Fwd ambulation with rowing simulation 4x20ft 5# bar  Rest break given after 2 sets   Slow ambulation No AD 4x20ft   Cues for heel strike as pt would strike toes first.           Other:    Home Program Education:   - 1/20: discussed with wife to encourage < 4 steps with 2WW when turning. Pt to work on this through the week. - 1/27: Discussed with wife to encourage pt to stop when hitting something and bringing attention to the environment to see what caused obstabcle to build insight and problem solving.   -2/1: educated pt and wife to move walker to EOB vs across the room for safety . - 2/8: educated wife on progress & discussed POC. Talked with her to stop pt when he becomes unsteady vs letting him continue with the task.        Specific Instructions for next treatment: Working mainly on balance       Assessment: [] Progressing toward goals. [] No change. [x] Other: Focused session mainly on balance with incorporating strengthening as well. Patient demonstrating increased fatigue throughout session today, requiring many rest breaks due to SOB and muscle fatigue. Added ankle weights to BLE with good tolerance to this progression, but fatiguing quicker. Ended session early due to fatigue. Also discussed fall from last week and avoiding use of loose rugs within home. [x] Patient would continue to benefit from skilled physical therapy services in order to: improve balance, increased postural alignment, improve motor planning and getting larger movements, and improve gait especially with turning to allow for safe and more IND mobility in the home. GOALS:      STG: (to be met in 10 treatments) -assessed 2/8/23  Pt will be able to ambulate with 2WW at gait speed of > 0.6m/s to demonstrate a good home ambulation gait speed to increase mobility in the home. - 2/8: MET   Pt will be able to consistently stand with bilateral knees extended with 2WW to improve standing alignment.    -2/8: MET   Pt will be able to complete STS with supervision using AD and not needing assist to stabilize to improve safety with transfers in the home. - 2/8: MET    Pt will consistent be able to turn to align with chair minimal freezing and ability to self correct to be mor efficient with transfers.   -2/8 :Progressing -- still needing moderate cues    Pt will improve score on DORAN balance scale by >/=6 points (>28 pt) to improve overall balance stability and decrease fall risk with mobility.      - 2/8: MET - 30/56 this date    Pt will improve time on 5xSTS to <20 seconds with use of RUE and consistently coming to full stand to decrease fall risk and increase functional capacity for mobility.    -2/8: MET but using L UE support  LTG: (to be met in 20 treatments)  Pt will be able to ambulate with a rollator for > 500ft and around obstacles with supervision to maximize mobility for around the home.     -2/8: Progressing    NEW GOAL 2/8: Pt will improve score on DORAN balance scale by >/=6 points (>36 pt) to improve overall balance stability and decrease fall risk with mobility. Pt will improve time on TUG to < 20 seconds with LRAD to be able to demonstrate decreased fall risk with home mobility.    -2/8: MET   3. NEW GOAL 2/8: Pt will be able to complete turns with navigation with no freezing 50% of time showing improved ability and decreased fall risk with navigating at home. 4. NEW GOAL 2/8: Pt will be able to self correct freezing episode in < 3 seconds to improve fluidity of mobility with less impact of freezing. Pt will improve score on Self-assessment of PD Disability scale to < 26/115 to demonstrate improvement in functional ADL tasks that are meaningful.    -2/8 : Will assess at later time. Pt will be independent with home program addressing strength, flexibility and balance to maximize gains made in therapy to improve overall functional capacity for mobility.     -2/8: progressing -- continuing to update wife   Pt will report no falls for x6 weeks demonstrating improved safety with mobility and implementation of fall prevention strategies. -2/8: continuing to educate as pt still having falls. Recommending close supervision to SBA. Pt. Education:  [x] Yes  [] No  [] Reviewed Prior HEP/Ed  Method of Education: [x] Verbal  [] Demo  [] Written  Edu: use of rugs within home  Comprehension of Education:  [x] Verbalizes understanding. [] Demonstrates understanding. [] Needs review. [] Demonstrates/verbalizes HEP/Ed previously given. Plan: [x] Continue per plan of care.    [] Other:        Treatment Charges: Mins Units   []  Modalities     [x]  Ther Exercise 15 1   []  Manual Therapy     []  Ther Activities     []  Aquatics     [x]  Neuromuscular 15 1   [] Vasocompression     [x] Gait Training 15 1   [] Dry needling        [] 1 or 2 muscles        [] 3 or more muscles     []  Other     Total Treatment time 45 3     Time In: 2:30 pm     Time Out: 3:15pm     Electronically signed by:  Boo Roman PTA

## 2023-03-01 ENCOUNTER — HOSPITAL ENCOUNTER (OUTPATIENT)
Dept: PHYSICAL THERAPY | Age: 68
Setting detail: THERAPIES SERIES
Discharge: HOME | End: 2023-03-01
Payer: MEDICARE

## 2023-03-01 PROCEDURE — 97112 NEUROMUSCULAR REEDUCATION: CPT

## 2023-03-01 PROCEDURE — 97116 GAIT TRAINING THERAPY: CPT

## 2023-03-01 PROCEDURE — 97110 THERAPEUTIC EXERCISES: CPT

## 2023-03-01 NOTE — FLOWSHEET NOTE
[facility]   [address] [place] [address] [address]   Phone: [phone]   Fax: [phone]    Physical Therapy Daily Treatment Note      Date:  3/1/2023  Patient Name:  Michelle King    :  1955  MRN: [id number]  Physician: Farzana Garcia MD                               Insurance: [address] [place]   Medical Diagnosis:   G95.20 (ICD-10-CM) - Cord compression ([facility] [address]   G20 (ICD-10-CM) - Parkinson disease ([facility] [address]                 Rehab Codes: Z74.0 reduced mobility, R26.89 gait abnormality, R 29.3 abnormal posture, R27.8 lack of coordination   Date of symptom onset: 22 -DOS; Parkinson's 15+ years   Next 's appt. : 23 - Dr Faustino Ortiz   Visit# / total visits: 15/20  Cancels/No Shows: 1/0      Precautions: PD with cognitive deficits, impulsive, HIGH FALL RISK     Subjective:    Patient reports feeling achy all over his body, with his low back having increased pain today. Patient denies any changes or falls since last session and is unsure why he is feeling this way.    Pain:  [] Yes  [x] No Location: Low back  Pain Rating: (0-10 scale) 8/10  Pain altered Tx:  [x] No  [] Yes  Action:  Comments:       Objective:  INTERVENTIONS  INTERVENTIONS  Reps/ Time Weight/ Level Completed  Today Comments          EXERCISES        Transfer training     TOTAL TIME    Sit to stands  x10   SBA, cues to come more forward as he is on his heels    Sit to stand with moving rings  x12   UE support from chair, then moves x2 rings across arc with reaching cross body    Squat + stand with moving cones  20x   Squats then grabs cone to move laterally to target as he stands    Sit to stand with stopping ball  10x   Once in standing ball rolled at him and he stops the ball for reactive & anticipatory balance    Sit to stand with forward step to target 2x10 ea LE step   CGA to Kyler    Education to align to chair surface & hand placement when sitting     Completed throughout session -- allowed increased time to problem solve and gave cues as needed           Stretches        HS stretch on step 2x30'' ea   Assist given to keep foot in DF and knee extended   DF stretch on slant board  2x30s              Strength     BUE support to focus on strength    TKE 2x15 ea green     Heel/toe raises 2x20      Mini squats  3x10   Therapist behind to cue for squat depth    Step + lunge to target fwd 2x15 ea 2# aw  BUE support    Step + lunge to target lateral 2x10 ea 2# aw     Marching 20x ea 2# aw            BALANCE       Low to high rotational ball passes  15x ea   In standing -- limited tolerance due to knee discomfort   Standing Ball tosses 2x20      Standing Ball bounces  2x20       Standing overhead lifts  10x2 4# ball     Standing tracing large circles: Cw/CCW  10x2 4# ball      OH lifts while at wall 10x 4# ball     Cone from [facility] > to [place] hand off to opp UE > to table 8x ea   No UE support, tactile cueing at distal quads.  Very fatigued with last set   High knee marches 5x ea   CGA, but required Mod-MaxA for  3x LOB correction   Step to targets fwd/lat 10x ea  x    Fwd Toe taps to half ball 10x2 ea   CGA, 2x requiring modA to correct LOB   Lateral toe taps to half ball 10x2 ea      Step to target with B arm raises 10x ea  x No UE support   Stand +pivot to target 10x ea  x Cues for weight shifting                        [facility]  [id number]   Total time: 27minutes    Visual scan- user paced 20x2   1x reaching with LUE, 1x reaching with RUE      Complex array - sequence ON FOAM 30x2 #s, size 6  1x reaching with LUE, 1x reaching with RUE    Visual scan - user paced with screen low 20x2   1x reaching with LUE, 1x reaching with RUE    Chart- match 2 trials   Center fixation with peripheral matching   Visual scan- user paced WITH activity 20x2 ea   Pt to tap screen with RUE then tap therapist hand on opposite side with LUE as quickly as possible to challenge reactive balance - Then LUE to screen and RUE to therapist - Verbal cues such as \"L screen, R hand\" increased accuracy from 80% to 95%. Gait:       Walking rollator  4x75ft   x    Walking marches with rollator  4x30ft       Weaving around obstacles  5 min    Mild to moderate freezing    Walking btwn chairs x5   Walks with rollator to chair  and has to align; cues to weight shift to break freezing    Cone retrieval 6 cones x2             Retro Walking with rollator 30ft x4      Gait with RW walking in large square working on turns  X3 laps ea direction   Goal to reduce shuffling steps when turning    Walking over cones with rollator  10 min  CGA  Cues to approximate to cones vs taking too early of a step causing imbalance    Rollator with head turns vert & horizontal 2x65ft ea   Inconsistent in keeping head moving    Cognitive dual tasks with walking  4x50ft   Slows walking but pt is more upright and controlled. -- discussed with wife post session 2/3   NO AD fwd ambulation with turns/passing ball from RUE to L UE 4x15ft   CGA    Fwd ambulation with chest press 4x20ft 5# bar     Fwd ambulation with OH press 4x20ft 5# bar     Fwd ambulation with rowing simulation 4x20ft 5# bar  Rest break given after 2 sets   Slow ambulation No AD 4x20ft   Cues for heel strike as pt would strike toes first.    Walking with rollator --metronome 4x75ft  x 70-73bpm for slower speed and increased step length to decrease shuffling -- found auditory cues not as helpful as visual/tactile                        Other:    Home Program Education:   - 1/20: discussed with wife to encourage < 4 steps with 2WW when turning. Pt to work on this through the week. - 1/27: Discussed with wife to encourage pt to stop when hitting something and bringing attention to the environment to see what caused obstabcle to build insight and problem solving.   -2/1: educated pt and wife to move walker to EOB vs across the room for safety . - 2/8: educated wife on progress & discussed POC.  Talked with her to stop pt when he becomes unsteady vs letting him continue with the task. Specific Instructions for next treatment: Working mainly on balance       Assessment: [] Progressing toward goals. [] No change. [x] Other: 3/1: Progressions limited this date by patients level of pain and amount of rest breaks required between exercises. Completed above documented exercises with addition of metronome for ambulation to keep steady pace with an auditory cue, however it was found to not be beneficial for patient this date. Reactive balance exercises incorporated this date without use of UE support. Will plan to use UE support at next visit as this will be more functional and promote balance correction with use of rollator. [x] Patient would continue to benefit from skilled physical therapy services in order to: improve balance, increased postural alignment, improve motor planning and getting larger movements, and improve gait especially with turning to allow for safe and more IND mobility in the home. GOALS:      STG: (to be met in 10 treatments) -assessed 2/8/23  Pt will be able to ambulate with 2WW at gait speed of > 0.6m/s to demonstrate a good home ambulation gait speed to increase mobility in the home. - 2/8: MET   Pt will be able to consistently stand with bilateral knees extended with 2WW to improve standing alignment.    -2/8: MET   Pt will be able to complete STS with supervision using AD and not needing assist to stabilize to improve safety with transfers in the home. - 2/8: MET    Pt will consistent be able to turn to align with chair minimal freezing and ability to self correct to be mor efficient with transfers.   -2/8 :Progressing -- still needing moderate cues    Pt will improve score on DORAN balance scale by >/=6 points (>28 pt) to improve overall balance stability and decrease fall risk with mobility.      - 2/8: MET - 30/56 this date    Pt will improve time on 5xSTS to <20 seconds with use of RUE and consistently coming to full stand to decrease fall risk and increase functional capacity for mobility.    -2/8: MET but using L UE support  LTG: (to be met in 20 treatments)  Pt will be able to ambulate with a rollator for > 500ft and around obstacles with supervision to maximize mobility for around the home.     -2/8: Progressing    NEW GOAL 2/8: Pt will improve score on DORAN balance scale by >/=6 points (>36 pt) to improve overall balance stability and decrease fall risk with mobility. Pt will improve time on TUG to < 20 seconds with LRAD to be able to demonstrate decreased fall risk with home mobility.    -2/8: MET   3. NEW GOAL 2/8: Pt will be able to complete turns with navigation with no freezing 50% of time showing improved ability and decreased fall risk with navigating at home. 4. NEW GOAL 2/8: Pt will be able to self correct freezing episode in < 3 seconds to improve fluidity of mobility with less impact of freezing. Pt will improve score on Self-assessment of PD Disability scale to < 26/115 to demonstrate improvement in functional ADL tasks that are meaningful.    -2/8 : Will assess at later time. Pt will be independent with home program addressing strength, flexibility and balance to maximize gains made in therapy to improve overall functional capacity for mobility.     -2/8: progressing -- continuing to update wife   Pt will report no falls for x6 weeks demonstrating improved safety with mobility and implementation of fall prevention strategies. -2/8: continuing to educate as pt still having falls. Recommending close supervision to SBA. Pt. Education:  [x] Yes  [] No  [] Reviewed Prior HEP/Ed  Method of Education: [x] Verbal  [] Demo  [] Written  Edu: use of rugs within home  Comprehension of Education:  [x] Verbalizes understanding. [] Demonstrates understanding. [] Needs review. [] Demonstrates/verbalizes HEP/Ed previously given.          Plan: [x] Continue per plan of care.    [] Other:        Treatment Charges: Mins Units   []  Modalities     [x]  Ther Exercise 15 1   []  Manual Therapy     []  Ther Activities     []  Aquatics     [x]  Neuromuscular 15 1   [] Vasocompression     [x] Gait Training 15 1   [] Dry needling        [] 1 or 2 muscles        [] 3 or more muscles     []  Other     Total Treatment time 45 3     Time In: 10:25am     Time Out: 11:10am     Electronically signed by:  Fili Patricio PTA

## 2023-03-06 ENCOUNTER — HOSPITAL ENCOUNTER (OUTPATIENT)
Dept: PHYSICAL THERAPY | Age: 68
Setting detail: THERAPIES SERIES
Discharge: HOME | End: 2023-03-06
Payer: MEDICARE

## 2023-03-06 PROCEDURE — 97110 THERAPEUTIC EXERCISES: CPT

## 2023-03-06 PROCEDURE — 97112 NEUROMUSCULAR REEDUCATION: CPT

## 2023-03-06 NOTE — FLOWSHEET NOTE
[facility]   [address]   Phone: [phone]   Fax: [phone]    Physical Therapy Daily Treatment Note      Date:  3/6/2023  Patient Name:  Willow Dasilva    :  1955  MRN: [id number]  Physician: Ruth Mora MD                               Insurance: [address] [place]   Medical Diagnosis:   G95.20 (ICD-10-CM) - Cord compression ([facility] [address]   G20 (ICD-10-CM) - Parkinson disease ([facility] [address]                 Rehab Codes: Z74.0 reduced mobility, R26.89 gait abnormality, R 29.3 abnormal posture, R27.8 lack of coordination   Date of symptom onset: 22 -DOS; Parkinson's 15+ years   Next 's appt. : 23 - Dr Mickey Hernandez   Visit# / total visits:   Cancels/No Shows: 1/0      Precautions: PD with cognitive deficits, impulsive, HIGH FALL RISK     Subjective:    Patient reports being ill the last few days with diarrhea but is starting to feel better today. Patient states his back pain is better than last session as well.    Pain:  [x] Yes  [] No Location: Low back  Pain Rating: (0-10 scale) 5/10  Pain altered Tx:  [x] No  [] Yes  Action:  Comments:       Objective:  INTERVENTIONS  INTERVENTIONS  Reps/ Time Weight/ Level Completed  Today Comments          EXERCISES        Transfer training     TOTAL TIME    Sit to stands  x10   SBA, cues to come more forward as he is on his heels    Sit to stand with moving rings  x12   UE support from chair, then moves x2 rings across arc with reaching cross body    Squat + stand with moving cones  20x   Squats then grabs cone to move laterally to target as he stands    Sit to stand with stopping ball  10x   Once in standing ball rolled at him and he stops the ball for reactive & anticipatory balance    Sit to stand with forward step to target 2x10 ea LE step   CGA to Kyler    Education to align to chair surface & hand placement when sitting     Completed throughout session -- allowed increased time to problem solve and gave cues as needed           Stretches        HS stretch on step 2x30'' ea   Assist given to keep foot in DF and knee extended   DF stretch on slant board  2x30s              Strength     BUE support to focus on strength    TKE 2x15 ea green     Heel/toe raises 2x20  x    Mini squats  3x10  x To green chair   Step + lunge to target fwd 2x15 ea 2# aw  BUE support    Step + lunge to target lateral 2x10 ea 2# aw     Marching 20x ea 2# aw            BALANCE       Low to high rotational ball passes  15x ea   In standing -- limited tolerance due to knee discomfort   Standing Ball tosses 2x20      Standing Ball bounces  2x20       Standing overhead lifts  10x2 4# ball     Standing tracing large circles: Cw/CCW  10x2 4# ball      OH lifts while at wall 10x 4# ball     [facility] from [facility] > to [place] hand off to opp UE > to table 8x ea   No UE support, tactile cueing at distal quads. Very fatigued with last set   High knee marches 5x ea   CGA, but required Mod-MaxA for  3x LOB correction   Step to targets fwd/lat 10x ea      Fwd Toe taps to half ball 10x2 ea   CGA, 2x requiring modA to correct LOB   Lateral toe taps to half ball 10x2 ea      Step to target with B arm raises 10x ea   No UE support   Stand +pivot to target 10x ea   Cues for weight shifting   Step to target with rollator 10x ea  x 4 color targets at each side of pt.  PTA states color for pt to step to                 [facility]  [id number]   Total time: 27minutes    Visual scan- user paced 20x2   1x reaching with LUE, 1x reaching with RUE      Complex array - sequence ON FOAM 30x2 #s, size 6  1x reaching with LUE, 1x reaching with RUE    Visual scan - user paced with screen low 20x2  x RUE: 90.91% accuracy, 1.93sec reaction time  LUE:95.24% accuracy, 1.64sec reaction time   Chart- match 2 trials   Center fixation with peripheral matching   Visual scan- user paced WITH activity 20x2 ea  x Pt to tap screen with RUE then tap therapist hand on opposite side with LUE as quickly as possible to challenge reactive balance - Then LUE to screen and RUE to therapist   RUE to screen: 86.96% accuracy, 4.14sec reaction time  LUE to screen: 86.96% accuracy, 4.02 sec reaction time   Visual Scan- time paced 2min ea  x RUE- 85.17% accuracy, reaction time 2.22 sec  LUE - 82.05% accuracy, reaction time 1.86 sec   Gait:       Walking rollator  4x75ft   x    Walking marches with rollator  4x30ft       Weaving around obstacles  5 min    Mild to moderate freezing    Walking btwn chairs [employer] with rollator to chair  and has to align; cues to weight shift to break freezing    Cone retrieval 6 cones x2             Retro Walking with rollator 30ft x4      Gait with RW walking in large square working on turns  X3 laps ea direction   Goal to reduce shuffling steps when turning    Walking over cones with rollator  10 min  CGA  Cues to approximate to cones vs taking too early of a step causing imbalance    Rollator with head turns vert & horizontal 2x65ft ea   Inconsistent in keeping head moving    Cognitive dual tasks with walking  4x50ft   Slows walking but pt is more upright and controlled.  -- discussed with wife post session 2/3   NO AD fwd ambulation with turns/passing ball from RUE to L UE 4x15ft   CGA    Fwd ambulation with chest press 4x20ft 5# bar     Fwd ambulation with OH press 4x20ft 5# bar     Fwd ambulation with rowing simulation 4x20ft 5# bar  Rest break given after 2 sets   Slow ambulation No AD 4x20ft   Cues for heel strike as pt would strike toes first.    Walking with rollator --metronome 4x75ft   70-73bpm for slower speed and increased step length to decrease shuffling -- found auditory cues not as helpful as visual/tactile   Walking to target and backwards to chair- keeping rollator between carpet tiles 10x8ft  x    Walking in anand with walking over targets to stay in straight path 40ft x4  [employer] to decrease path deviations          Other:    Home Program Education:   - 1/20: discussed with wife to encourage < 4 steps with 2WW when turning. Pt to work on this through the week. - 1/27: Discussed with wife to encourage pt to stop when hitting something and bringing attention to the environment to see what caused obstabcle to build insight and problem solving.   -2/1: educated pt and wife to move walker to EOB vs across the room for safety . - 2/8: educated wife on progress & discussed POC. Talked with her to stop pt when he becomes unsteady vs letting him continue with the task. Specific Instructions for next treatment: Working mainly on balance       Assessment: [] Progressing toward goals. [] No change. [x] Other: 3/6: Focused more on balance and reaction time with use of rollator today. Added activity with step to target with verbal cues from PTA to specific color target. Patient had occasional difficulty with processing the auditory cues and demonstrated impulsivity with pushing rollator aside to reach target rather than just stepping in the direction of it. Added gait activity with targets to keep straight path and decrease path deviations. Seated rest breaks given throughout session due to quick fatigue and session ended a little early due to stomach issue. [x] Patient would continue to benefit from skilled physical therapy services in order to: improve balance, increased postural alignment, improve motor planning and getting larger movements, and improve gait especially with turning to allow for safe and more IND mobility in the home. GOALS:      STG: (to be met in 10 treatments) -assessed 2/8/23  Pt will be able to ambulate with 2WW at gait speed of > 0.6m/s to demonstrate a good home ambulation gait speed to increase mobility in the home.      - 2/8: MET   Pt will be able to consistently stand with bilateral knees extended with 2WW to improve standing alignment.    -2/8: MET   Pt will be able to complete STS with supervision using AD and not needing assist to stabilize to improve safety with transfers in the home. - 2/8: MET    Pt will consistent be able to turn to align with chair minimal freezing and ability to self correct to be mor efficient with transfers.   -2/8 :Progressing -- still needing moderate cues    Pt will improve score on DORAN balance scale by >/=6 points (>28 pt) to improve overall balance stability and decrease fall risk with mobility. - 2/8: MET - 30/56 this date    Pt will improve time on 5xSTS to <20 seconds with use of RUE and consistently coming to full stand to decrease fall risk and increase functional capacity for mobility.    -2/8: MET but using L UE support  LTG: (to be met in 20 treatments)  Pt will be able to ambulate with a rollator for > 500ft and around obstacles with supervision to maximize mobility for around the home.     -2/8: Progressing    NEW GOAL 2/8: Pt will improve score on DORAN balance scale by >/=6 points (>36 pt) to improve overall balance stability and decrease fall risk with mobility. Pt will improve time on TUG to < 20 seconds with LRAD to be able to demonstrate decreased fall risk with home mobility.    -2/8: MET   3. NEW GOAL 2/8: Pt will be able to complete turns with navigation with no freezing 50% of time showing improved ability and decreased fall risk with navigating at home. 4. NEW GOAL 2/8: Pt will be able to self correct freezing episode in < 3 seconds to improve fluidity of mobility with less impact of freezing. Pt will improve score on Self-assessment of PD Disability scale to < 26/115 to demonstrate improvement in functional ADL tasks that are meaningful.    -2/8 : Will assess at later time.    Pt will be independent with home program addressing strength, flexibility and balance to maximize gains made in therapy to improve overall functional capacity for mobility.     -2/8: progressing -- continuing to update wife   Pt will report no falls for x6 weeks demonstrating improved safety with mobility and implementation of fall prevention strategies. -2/8: continuing to educate as pt still having falls. Recommending close supervision to SBA. Pt. Education:  [] Yes  [x] No  [] Reviewed Prior HEP/Ed  Method of Education: [] Verbal  [] Demo  [] Written  Comprehension of Education:  [] Verbalizes understanding. [] Demonstrates understanding. [] Needs review. [] Demonstrates/verbalizes HEP/Ed previously given. Plan: [x] Continue per plan of care.    [] Other:     Treatment Charges: Mins Units   []  Modalities     [x]  Ther Exercise 10 1   []  Manual Therapy     []  Ther Activities     []  Aquatics     [x]  Neuromuscular 40 2   [] Vasocompression     [] Gait Training     [] Dry needling        [] 1 or 2 muscles        [] 3 or more muscles     []  Other     Total Treatment time 50 3     Time In: 11:20am     Time Out: 12:10pm     Electronically signed by:  Daphnie Nova PTA

## 2023-03-10 ENCOUNTER — HOSPITAL ENCOUNTER (OUTPATIENT)
Dept: PHYSICAL THERAPY | Age: 68
Setting detail: THERAPIES SERIES
Discharge: HOME | End: 2023-03-10
Payer: MEDICARE

## 2023-03-10 PROCEDURE — 97112 NEUROMUSCULAR REEDUCATION: CPT

## 2023-03-10 PROCEDURE — 97530 THERAPEUTIC ACTIVITIES: CPT

## 2023-03-10 PROCEDURE — 97110 THERAPEUTIC EXERCISES: CPT

## 2023-03-10 NOTE — FLOWSHEET NOTE
[facility]   [address]   Phone: [phone]   Fax: [phone]    Physical Therapy Daily Treatment Note      Date:  3/10/2023  Patient Name:  Kimmie Casper    :  1955  MRN: [id number]  Physician: Sin Alva MD                               Insurance: [address] - based on [place] (track cap)   Medical Diagnosis:   G95.20 (ICD-10-CM) - Cord compression ([facility]   G20 (ICD-10-CM) - Parkinson disease ([facility] [address]                 Rehab Codes: Z74.0 reduced mobility, R26.89 gait abnormality, R 29.3 abnormal posture, R27.8 lack of coordination   Date of symptom onset: 22 -DOS; Parkinson's 15+ years   Next 's appt. : 23 - Dr Olinda Lackey   Visit# / total visits:   Cancels/No Shows: 1/0      Precautions: PD with cognitive deficits, impulsive, HIGH FALL RISK     Subjective:    Patient reports minimal low back pain this date. Patient denying any falls or changes since last session.      Pain:  [x] Yes  [] No Location: Low back  Pain Rating: (0-10 scale) 1/10  Pain altered Tx:  [] No  [x] Yes  Action: End of session required seated exercises due to increased back pain while standing  Comments:       Objective:  INTERVENTIONS  INTERVENTIONS  Reps/ Time Weight/ Level Completed  Today Comments          EXERCISES        Transfer training     TOTAL TIME    Sit to stands  x10   SBA, cues to come more forward as he is on his heels    Sit to stand with moving rings  x12   UE support from chair, then moves x2 rings across arc with reaching cross body    Squat + stand with moving cones  20x   Squats then grabs cone to move laterally to target as he stands    Sit to stand with stopping ball  10x   Once in standing ball rolled at him and he stops the ball for reactive & anticipatory balance    Sit to stand with forward step to target 2x10 ea LE step   CGA to Kyler    Education to align to chair surface & hand placement when sitting     Completed throughout session -- allowed increased time to problem solve and gave cues as needed           Stretches        HS stretch on step 2x30'' ea   Assist given to keep foot in DF and knee extended   DF stretch on slant board  2x30s              Strength     BUE support to focus on strength    TKE 2x15 ea green     Heel/toe raises 2x20      Mini squats  3x10   To green chair   Step + lunge to target fwd 2x15 ea 2# aw  BUE support    Step + lunge to target lateral 2x10 ea 2# aw     Marching 20x ea 2# aw     Seated marching 20x ea  x Sitting up straight, no UE use on arm rests   Seated LAQ 20x ea  x    Seated Ankle pumps 20x ea  x    Seated Lateral step over and out 20x ea  x           BALANCE       Low to high rotational ball passes  15x ea   In standing -- limited tolerance due to knee discomfort   Standing Ball tosses 2x20      Standing Ball bounces  2x20       Standing overhead lifts  10x2 4# ball     Standing tracing large circles: Cw/CCW  10x2 4# ball      OH lifts while at wall 10x 4# ball     C[facility] from [facility] > to [facility] hand off to opp UE > to table 8x ea   No UE support, tactile cueing at distal quads. Very fatigued with last set   High knee marches 5x ea   CGA, but required Mod-MaxA for  3x LOB correction   Step to targets fwd/lat 10x ea      Fwd Toe taps to half ball 10x2 ea   CGA, 2x requiring modA to correct LOB   Lateral toe taps to half ball 10x2 ea      Step to target with B arm raises 10x ea   No UE support   Stand +pivot to target 10x ea   Cues for weight shifting   Step to target with rollator 10x ea  x 4 color targets at each side of pt.  PTA states color for pt to step to   Standing over the shoulder ball passes 10x ea Volley ball x CGA          BITS  [id number]   Total time: 27minutes    Visual scan- user paced 20x2   1x reaching with LUE, 1x reaching with RUE      Complex array - sequence ON FOAM 30x2 #s, size 6  1x reaching with LUE, 1x reaching with RUE    Visual scan - user paced with screen low 20x2   RUE: 90.91% accuracy, 1.93sec reaction time  LUE:95.24% accuracy, 1.64sec reaction time   Chart- match 2 trials   Center fixation with peripheral matching   Visual scan- user paced WITH activity 20x2 ea   Pt to tap screen with RUE then tap therapist hand on opposite side with LUE as quickly as possible to challenge reactive balance - Then LUE to screen and RUE to therapist   RUE to screen: 86.96% accuracy, 4.14sec reaction time  LUE to screen: 86.96% accuracy, 4.02 sec reaction time   Visual Scan- time paced 2min ea   RUE- 85.17% accuracy, reaction time 2.22 sec  LUE - 82.05% accuracy, reaction time 1.86 sec   Cognitive: Memory 2min x2  x 1st : RUE reaching; 72.73% accuracy with 8 successful trials  2nd : LUE reaching; 81.82%, 9 successful trials           Gait:       Walking rollator  4x75ft       Walking marches with rollator  4x30ft       Weaving around obstacles  5 min    Mild to moderate freezing    Walking btwn chairs [employer] with rollator to chair  and has to align; cues to weight shift to break freezing    Cone retrieval 6 cones x2             Retro Walking with rollator 30ft x4      Gait with RW walking in large square working on turns  X3 laps ea direction   Goal to reduce shuffling steps when turning    Walking over cones with rollator  10 min  CGA  Cues to approximate to cones vs taking too early of a step causing imbalance    Rollator with head turns vert & horizontal 2x65ft ea   Inconsistent in keeping head moving    Cognitive dual tasks with walking  4x50ft   Slows walking but pt is more upright and controlled.  -- discussed with wife post session 2/3   NO AD fwd ambulation with turns/passing ball from RUE to L UE 4x15ft   CGA    Fwd ambulation with chest press 4x20ft 5# bar     Fwd ambulation with OH press 4x20ft 5# bar     Fwd ambulation with rowing simulation 4x20ft 5# bar  Rest break given after 2 sets   Slow ambulation No AD 4x20ft   Cues for heel strike as pt would strike toes first.    Walking with rollator --metronome 4x75ft   70-73bpm for slower speed and increased step length to decrease shuffling -- found auditory cues not as helpful as visual/tactile   Walking to target and backwards to chair- keeping rollator between carpet tiles 10x8ft      Walking in anand with walking over targets to stay in straight path 40ft x4   Incorporated to decrease path deviations          STS activity with gait 6 dots + 5 STS ea  x Colored dots placed on chairs throughout clinic. Pt given paper with list of colors and instructed to find dots in color order and complete 5 STS at each chair while demonstrating safety awareness with chair and rollator    Chair push up activity with gait  6 dots + 5 Chair push ups ea  x Colored dots placed on chairs throughout clinic. Pt given paper with list of colors and instructed to find dots in color order and complete 5 chair push ups at each chair while demonstrating safety awareness with chair and rollator    Other:    Home Program Education:   - 1/20: discussed with wife to encourage < 4 steps with 2WW when turning. Pt to work on this through the week. - 1/27: Discussed with wife to encourage pt to stop when hitting something and bringing attention to the environment to see what caused obstabcle to build insight and problem solving.   -2/1: educated pt and wife to move walker to EOB vs across the room for safety . - 2/8: educated wife on progress & discussed POC. Talked with her to stop pt when he becomes unsteady vs letting him continue with the task. Specific Instructions for next treatment: Working mainly on balance       Assessment: [] Progressing toward goals. [] No change. [x] Other: 3/10: Focused large portion of session on therapeutic activity to include visual scanning, safety awareness with rollator, STS and chair push ups. Patient did well with this and was able to remain CGA but had few instances of hitting objects in gym with rollator.  Visual demonstration required for chair pushups as pt would attempt to complete full STS. Patient demonstrated fair carryover throughout session. Continued with BITS activity challenging balance and memory with 72-80% success rate. Ended session with seated LE exercises as pt stated his low back was bothering him and he would like to sit for remainder of session. [x] Patient would continue to benefit from skilled physical therapy services in order to: improve balance, increased postural alignment, improve motor planning and getting larger movements, and improve gait especially with turning to allow for safe and more IND mobility in the home. GOALS:      STG: (to be met in 10 treatments) -assessed 2/8/23  Pt will be able to ambulate with 2WW at gait speed of > 0.6m/s to demonstrate a good home ambulation gait speed to increase mobility in the home. - 2/8: MET   Pt will be able to consistently stand with bilateral knees extended with 2WW to improve standing alignment.    -2/8: MET   Pt will be able to complete STS with supervision using AD and not needing assist to stabilize to improve safety with transfers in the home. - 2/8: MET    Pt will consistent be able to turn to align with chair minimal freezing and ability to self correct to be mor efficient with transfers.   -2/8 :Progressing -- still needing moderate cues    Pt will improve score on DORAN balance scale by >/=6 points (>28 pt) to improve overall balance stability and decrease fall risk with mobility.      - 2/8: MET - 30/56 this date    Pt will improve time on 5xSTS to <20 seconds with use of RUE and consistently coming to full stand to decrease fall risk and increase functional capacity for mobility.    -2/8: MET but using L UE support  LTG: (to be met in 20 treatments)  Pt will be able to ambulate with a rollator for > 500ft and around obstacles with supervision to maximize mobility for around the home.     -2/8: Progressing    NEW GOAL 2/8: Pt will improve score on DORAN balance scale by >/=6 points (>36 pt) to improve overall balance stability and decrease fall risk with mobility. Pt will improve time on TUG to < 20 seconds with LRAD to be able to demonstrate decreased fall risk with home mobility.    -2/8: MET   3. NEW GOAL 2/8: Pt will be able to complete turns with navigation with no freezing 50% of time showing improved ability and decreased fall risk with navigating at home. 4. NEW GOAL 2/8: Pt will be able to self correct freezing episode in < 3 seconds to improve fluidity of mobility with less impact of freezing. Pt will improve score on Self-assessment of PD Disability scale to < 26/115 to demonstrate improvement in functional ADL tasks that are meaningful.    -2/8 : Will assess at later time. Pt will be independent with home program addressing strength, flexibility and balance to maximize gains made in therapy to improve overall functional capacity for mobility.     -2/8: progressing -- continuing to update wife   Pt will report no falls for x6 weeks demonstrating improved safety with mobility and implementation of fall prevention strategies. -2/8: continuing to educate as pt still having falls. Recommending close supervision to SBA. Pt. Education:  [] Yes  [x] No  [] Reviewed Prior HEP/Ed  Method of Education: [] Verbal  [] Demo  [] Written  Comprehension of Education:  [] Verbalizes understanding. [] Demonstrates understanding. [] Needs review. [] Demonstrates/verbalizes HEP/Ed previously given. Plan: [x] Continue per plan of care.    [] Other:     Treatment Charges: Mins Units   []  Modalities     [x]  Ther Exercise 10 1   []  Manual Therapy     [x]  Ther Activities 30 2   []  Aquatics     [x]  Neuromuscular 15 1   [] Vasocompression     [] Gait Training     [] Dry needling        [] 1 or 2 muscles        [] 3 or more muscles     []  Other     Total Treatment time 55 3     Time In: 11:10 am     Time Out: 12:05pm     Electronically signed by:  Sylvia Merino, PTA

## 2023-03-14 ENCOUNTER — HOSPITAL ENCOUNTER (OUTPATIENT)
Dept: PHYSICAL THERAPY | Age: 68
Setting detail: THERAPIES SERIES
Discharge: HOME | End: 2023-03-14
Payer: MEDICARE

## 2023-03-14 NOTE — FLOWSHEET NOTE
[] [address]  [address]: [phone]   F: [phone]     Physical Therapy Cancel/No Show note    Date: 3/14/2023  Patient: Shirleen Osler  : 1955  MRN: [id number]    Visit Count:   Cancels/No Shows to date:     For today's appointment patient:    [x]  Cancelled    [] Rescheduled appointment    [] No-show     Reason given by patient:    []  Patient ill    []  Conflicting appointment    [] No transportation      [] Conflict with work    [x] No reason given    [] Weather related    [] [id number]    [] Other:      Comments:        [x] Next appointment was confirmed    Electronically signed by: Bishop Perrin PTA

## 2023-03-16 ENCOUNTER — HOSPITAL ENCOUNTER (OUTPATIENT)
Dept: PHYSICAL THERAPY | Age: 68
Setting detail: THERAPIES SERIES
Discharge: HOME | End: 2023-03-16
Payer: MEDICARE

## 2023-03-16 PROCEDURE — 97112 NEUROMUSCULAR REEDUCATION: CPT

## 2023-03-16 PROCEDURE — 97116 GAIT TRAINING THERAPY: CPT

## 2023-03-16 PROCEDURE — 97530 THERAPEUTIC ACTIVITIES: CPT

## 2023-03-16 NOTE — FLOWSHEET NOTE
[address] [facility] Outpatient Physical Therapy   [id number] [facility] [address]   Phone: [phone]   Fax: [phone]    Physical Therapy Daily Treatment Note      Date:  3/16/2023  Patient Name:  Kulwinder Soto    :  1955  MRN: [id number]  Physician: Stas Pastor MD                               Insurance: [address] - based on [place] (track cap)   Medical Diagnosis:   G95.20 (ICD-10-CM) - Cord compression ([facility]   G20 (ICD-10-CM) - Parkinson disease ([facility]                 Rehab Codes: Z74.0 reduced mobility, R26.89 gait abnormality, R 29.3 abnormal posture, R27.8 lack of coordination   Date of symptom onset: 22 -DOS; Parkinson's 15+ years   Next 's appt. : 23 - Dr Bonny Ulloa   Visit# / total visits:   Cancels/No Shows: 2/0      Precautions: PD with cognitive deficits, impulsive, HIGH FALL RISK     Subjective:    Patient reporting to therapy    Pain:  [] Yes  [x] No Location: Low back  Pain Rating: (0-10 scale) denies/10  Pain altered Tx:  [] No  [x] Yes  Action: End of session required seated exercises due to increased back pain while standing  Comments:       Objective:  INTERVENTIONS  INTERVENTIONS  Reps/ Time Weight/ Level Completed  Today Comments          EXERCISES        Transfer training     TOTAL TIME    Sit to stands  x10   SBA, cues to come more forward as he is on his heels    Sit to stand with moving rings  x12   UE support from chair, then moves x2 rings across arc with reaching cross body    Squat + stand with moving cones  20x   Squats then grabs cone to move laterally to target as he stands    Sit to stand with stopping ball  10x   Once in standing ball rolled at him and he stops the ball for reactive & anticipatory balance    Sit to stand with forward step to target 2x10 ea LE step   CGA to Kyler    Education to align to chair surface & hand placement when sitting     Completed throughout session -- allowed increased time to problem solve and gave cues as needed Stretches        HS stretch on step 2x30'' ea   Assist given to keep foot in DF and knee extended   DF stretch on slant board  2x30s              Strength     BUE support to focus on strength    TKE 2x15 ea green     Heel/toe raises 2x20      Mini squats  3x10   To green chair   Step + lunge to target fwd 2x15 ea 2# aw  BUE support    Step + lunge to target lateral 2x10 ea 2# aw     Marching 20x ea 2# aw     Seated marching 20x ea   Sitting up straight, no UE use on arm rests   Seated LAQ 20x ea      Seated Ankle pumps 20x ea      Seated Lateral step over and out 20x ea             BALANCE       Low to high rotational ball passes  15x ea   In standing -- limited tolerance due to knee discomfort   Standing Ball tosses 2x20      Standing Ball bounces  2x20       Standing overhead lifts  10x2 4# ball     Standing tracing large circles: Cw/CCW  10x2 4# ball      OH lifts while at wall 10x 4# ball     Cone from mat > to [facility] hand off to opp UE > to table 8x ea   No UE support, tactile cueing at distal quads. Very fatigued with last set   High knee marches 5x ea   CGA, but required Mod-MaxA for  3x LOB correction   Step to targets fwd/lat 10x ea      Fwd Toe taps to half ball 10x2 ea   CGA, 2x requiring modA to correct LOB   Lateral toe taps to half ball 10x2 ea      Step to target with B arm raises 10x ea   No UE support   Stand +pivot to target 10x ea   Cues for weight shifting   Step to target with rollator 8'  X 3 color targets at each side of pt.  PTA states color for pt to step to cues to drive walker for proper positioning to remain inside walker   Standing over the shoulder ball passes 10x ea Volley ball  CGA   Balloon tapping 45\" 2x with ea UE  X One UE on walker   BITS  [id number]   Total time: 27minutes    Visual scan- user paced 20x2   1x reaching with LUE, 1x reaching with RUE      Complex array - sequence ON FOAM 30x2 #s, size 6  1x reaching with LUE, 1x reaching with RUE    Visual scan - user paced with screen low 20x2   RUE: 90.91% accuracy, 1.93sec reaction time  LUE:95.24% accuracy, 1.64sec reaction time   Chart- match 2 trials   Center fixation with peripheral matching   Visual scan- user paced WITH activity 20x2 ea   Pt to tap screen with RUE then tap therapist hand on opposite side with LUE as quickly as possible to challenge reactive balance - Then LUE to screen and RUE to therapist   RUE to screen: 86.96% accuracy, 4.14sec reaction time  LUE to screen: 86.96% accuracy, 4.02 sec reaction time   Visual Scan- time paced 2min ea   RUE- 85.17% accuracy, reaction time 2.22 sec  LUE - 82.05% accuracy, reaction time 1.86 sec   Cognitive: Memory 2min x2   1st : RUE reaching; 72.73% accuracy with 8 successful trials  2nd : LUE reaching; 81.82%, 9 successful trials           Gait:       Walking rollator  4x75ft       Walking marches with rollator  4x30ft       Weaving around obstacles  3 laps  X Mild- mod freezing   Walking btwn chairs [employer] with rollator to chair  and has to align; cues to weight shift to break freezing    Cone retrieval 6 cones x2             Retro Walking with rollator 30ft x4      Gait with RW walking in large square working on turns  X3 laps ea direction   Goal to reduce shuffling steps when turning    Walking over cones with rollator  10 min  CGA  Cues to approximate to cones vs taking too early of a step causing imbalance    Rollator with head turns vert & horizontal 2x65ft ea   Inconsistent in keeping head moving    Cognitive dual tasks with walking  4x50ft   Slows walking but pt is more upright and controlled.  -- discussed with wife post session 2/3   NO AD fwd ambulation with turns/passing ball from RUE to L UE 4x15ft   CGA    Fwd ambulation with chest press 4x20ft 5# bar     Fwd ambulation with OH press 4x20ft 5# bar     Fwd ambulation with rowing simulation 4x20ft 5# bar  Rest break given after 2 sets   Slow ambulation No AD 4x20ft   Cues for heel strike as pt would strike toes first. Walking with rollator --metronome 4x75ft   70-73bpm for slower speed and increased step length to decrease shuffling -- found auditory cues not as helpful as visual/tactile   Walking to target and backwards to chair- keeping rollator between carpet tiles 10x8ft      Walking in anand with walking over targets to stay in straight path 40ft x4   Incorporated to decrease path deviations   Weaving through cones                     STS activity with gait 6 dots + 5 STS ea  X Colored dots placed on chairs throughout clinic. Pt given paper with list of colors and instructed to find dots in color order and complete 5 STS at each chair while demonstrating safety awareness with chair and rollator-- patient running into obstacles when turning   Chair push up activity with gait  6 dots + 5 Chair push ups ea   Colored dots placed on chairs throughout clinic. Pt given paper with list of colors and instructed to find dots in color order and complete 5 chair push ups at each chair while demonstrating safety awareness with chair and rollator    Other:    Home Program Education:   - 1/20: discussed with wife to encourage < 4 steps with 2WW when turning. Pt to work on this through the week. - 1/27: Discussed with wife to encourage pt to stop when hitting something and bringing attention to the environment to see what caused obstabcle to build insight and problem solving.   -2/1: educated pt and wife to move walker to EOB vs across the room for safety . - 2/8: educated wife on progress & discussed POC. Talked with her to stop pt when he becomes unsteady vs letting him continue with the task. Specific Instructions for next treatment: Working mainly on balance       Assessment: [] Progressing toward goals. [] No change. [x] Other: 3/16: patient reporting to therapy with less tremors noted at rest.  Focused on gait and sit to stand transfers this session.  Re-educated the patient on proper sit<>stand transfers to increase safety awareness when transferring. Cues also provided to encourage the patient to perform a full stand, with knees fully extended and proper weight shift forward, to ensure balance. Patient did have 1 episode of LOB posteriorly as he remembered to lock his walker in the midst of sitting. Patient performed weaving through cones with focus on increasing environmental awareness to prevent from running into obstacles. Added balloon tapping this session with single UE support on his walker to challenge balance with reaching out of CLARA to simulate performing daily activities. Added stepping to targets with rollator to increase safety awareness and use with walker with stepping in different directions. Patient reported feeling fatigued at the end of session. [x] Patient would continue to benefit from skilled physical therapy services in order to: improve balance, increased postural alignment, improve motor planning and getting larger movements, and improve gait especially with turning to allow for safe and more IND mobility in the home. GOALS:      STG: (to be met in 10 treatments) -assessed 2/8/23  Pt will be able to ambulate with 2WW at gait speed of > 0.6m/s to demonstrate a good home ambulation gait speed to increase mobility in the home. - 2/8: MET   Pt will be able to consistently stand with bilateral knees extended with 2WW to improve standing alignment.    -2/8: MET   Pt will be able to complete STS with supervision using AD and not needing assist to stabilize to improve safety with transfers in the home. - 2/8: MET    Pt will consistent be able to turn to align with chair minimal freezing and ability to self correct to be mor efficient with transfers.   -2/8 :Progressing -- still needing moderate cues    Pt will improve score on DORAN balance scale by >/=6 points (>28 pt) to improve overall balance stability and decrease fall risk with mobility.      - 2/8: MET - 30/56 this date    Pt will improve time on 5xSTS to <20 seconds with use of RUE and consistently coming to full stand to decrease fall risk and increase functional capacity for mobility.    -2/8: MET but using L UE support  LTG: (to be met in 20 treatments)  Pt will be able to ambulate with a rollator for > 500ft and around obstacles with supervision to maximize mobility for around the home.     -2/8: Progressing    NEW GOAL 2/8: Pt will improve score on DORAN balance scale by >/=6 points (>36 pt) to improve overall balance stability and decrease fall risk with mobility. Pt will improve time on TUG to < 20 seconds with LRAD to be able to demonstrate decreased fall risk with home mobility.    -2/8: MET   3. NEW GOAL 2/8: Pt will be able to complete turns with navigation with no freezing 50% of time showing improved ability and decreased fall risk with navigating at home. 4. NEW GOAL 2/8: Pt will be able to self correct freezing episode in < 3 seconds to improve fluidity of mobility with less impact of freezing. Pt will improve score on Self-assessment of PD Disability scale to < 26/115 to demonstrate improvement in functional ADL tasks that are meaningful.    -2/8 : Will assess at later time. Pt will be independent with home program addressing strength, flexibility and balance to maximize gains made in therapy to improve overall functional capacity for mobility.     -2/8: progressing -- continuing to update wife   Pt will report no falls for x6 weeks demonstrating improved safety with mobility and implementation of fall prevention strategies. -2/8: continuing to educate as pt still having falls. Recommending close supervision to SBA. Pt. Education:  [] Yes  [x] No  [] Reviewed Prior HEP/Ed  Method of Education: [] Verbal  [] Demo  [] Written  Comprehension of Education:  [] Verbalizes understanding. [] Demonstrates understanding. [] Needs review. [] Demonstrates/verbalizes HEP/Ed previously given.          Plan: [x] Continue per plan of care.   [] Other:     Treatment Charges: Mins Units   []  Modalities     []  Ther Exercise     []  Manual Therapy     [x]  Ther Activities 10 1   []  Aquatics     [x]  Neuromuscular 15 1   [] Vasocompression     [x] Gait Training 30 2   [] Dry needling        [] 1 or 2 muscles        [] 3 or more muscles     []  Other     Total Treatment time 55 4     Time In: 8481  Time Out: 3622    Electronically signed by:  Maury Dominguez PTA

## 2023-03-22 ENCOUNTER — HOSPITAL ENCOUNTER (OUTPATIENT)
Dept: PHYSICAL THERAPY | Age: 68
Setting detail: THERAPIES SERIES
Discharge: HOME | End: 2023-03-22
Payer: MEDICARE

## 2023-03-22 PROCEDURE — 97116 GAIT TRAINING THERAPY: CPT

## 2023-03-22 PROCEDURE — 97530 THERAPEUTIC ACTIVITIES: CPT

## 2023-03-22 NOTE — FLOWSHEET NOTE
reaching with LUE, 1x reaching with RUE    Visual scan - user paced with screen low 20x2   RUE: 90.91% accuracy, 1.93sec reaction time  LUE:95.24% accuracy, 1.64sec reaction time   Chart- match 2 trials   Center fixation with peripheral matching   Visual scan- user paced WITH activity 20x2 ea   Pt to tap screen with RUE then tap therapist hand on opposite side with LUE as quickly as possible to challenge reactive balance - Then LUE to screen and RUE to therapist   RUE to screen: 86.96% accuracy, 4.14sec reaction time  LUE to screen: 86.96% accuracy, 4.02 sec reaction time   Visual Scan- time paced 2min ea   RUE- 85.17% accuracy, reaction time 2.22 sec  LUE - 82.05% accuracy, reaction time 1.86 sec   Cognitive: Memory 2min x2  X 1st : RUE reaching; 72.73% accuracy with 8 successful trials  2nd : LUE reaching; 81.82%, 9 successful trials           Gait:       Walking rollator  4x75ft       Walking marches with rollator  4x30ft       Weaving around obstacles  5 laps- 50 ft laps  X Mild- mod freezing-- dependent on level of fatigue. Walking btwn chairs [employer] with rollator to chair  and has to align; cues to weight shift to break freezing    Cone retrieval 6 cones x2             Retro Walking with rollator 30ft x4      Gait with RW walking in large square working on turns  X3 laps ea direction   Goal to reduce shuffling steps when turning    Walking over cones with rollator  2 laps- 50 ft laps CGA X Cues to approximate to cones vs taking too early of a step; cues also given to ensure BLEs clear the cone when walking over them. Rollator with head turns vert & horizontal 2x65ft ea   Inconsistent in keeping head moving    Cognitive dual tasks with walking  4x50ft   Slows walking but pt is more upright and controlled.  -- discussed with wife post session 2/3   NO AD fwd ambulation with turns/passing ball from RUE to L UE 4x15ft   CGA    Fwd ambulation with chest press 4x20ft 5# bar     Fwd ambulation with OH press

## 2023-03-24 ENCOUNTER — HOSPITAL ENCOUNTER (OUTPATIENT)
Dept: PHYSICAL THERAPY | Age: 68
Setting detail: THERAPIES SERIES
Discharge: HOME | End: 2023-03-24
Payer: MEDICARE

## 2023-03-24 NOTE — CARE COORDINATION
[] [address]  [place]: [phone]   F: [phone]     Physical Therapy Cancel/No Show note    Date: 3/24/2023  Patient: Justo Baca  : 1955  MRN: [id number]    Visit Count:   Cancels/No Shows to date: 3/0    For today's appointment patient:    [x]  Cancelled    [] Rescheduled appointment    [] No-show     Reason given by patient:    [x]  Patient ill    []  Conflicting appointment    [] No transportation      [] Conflict with work    [] No reason given    [] Weather related    [] [id number]    [] Other:      Comments:        [x] Next appointment was confirmed    Electronically signed by: Ayush Murray PT

## 2023-03-29 ENCOUNTER — HOSPITAL ENCOUNTER (OUTPATIENT)
Dept: PHYSICAL THERAPY | Age: 68
Setting detail: THERAPIES SERIES
Discharge: HOME | End: 2023-03-29
Payer: MEDICARE

## 2023-03-29 PROCEDURE — 97116 GAIT TRAINING THERAPY: CPT

## 2023-03-29 PROCEDURE — 97112 NEUROMUSCULAR REEDUCATION: CPT

## 2023-03-29 NOTE — FLOWSHEET NOTE
meaningful.    -2/8 : Will assess at later time. Pt will be independent with home program addressing strength, flexibility and balance to maximize gains made in therapy to improve overall functional capacity for mobility.     -2/8: progressing -- continuing to update wife   Pt will report no falls for x6 weeks demonstrating improved safety with mobility and implementation of fall prevention strategies. -2/8: continuing to educate as pt still having falls. Recommending close supervision to SBA. Pt. Education:  [] Yes  [x] No  [] Reviewed Prior HEP/Ed  Method of Education: [] Verbal  [] Demo  [] Written  Comprehension of Education:  [] Verbalizes understanding. [] Demonstrates understanding. [] Needs review. [] Demonstrates/verbalizes HEP/Ed previously given. Plan: [x] Continue per plan of care.    [] Other:     Treatment Charges: Mins Units   []  Modalities     []  Ther Exercise     []  Manual Therapy     []  Ther Activities     []  Aquatics     [x]  Neuromuscular 10 1   [] Vasocompression     [x] Gait Training 35 2   [] Dry needling        [] 1 or 2 muscles        [] 3 or more muscles     []  Other     Total Treatment time 45 3     Time In: 8646 Time Out: 4055    Electronically signed by:  Willis Davis PTA

## 2023-08-09 ENCOUNTER — OFFICE VISIT (OUTPATIENT)
Dept: PHYSICAL MEDICINE AND REHAB | Age: 68
End: 2023-08-09
Payer: MEDICARE

## 2023-08-09 VITALS
HEART RATE: 83 BPM | HEIGHT: 74 IN | BODY MASS INDEX: 30.67 KG/M2 | SYSTOLIC BLOOD PRESSURE: 138 MMHG | DIASTOLIC BLOOD PRESSURE: 90 MMHG | TEMPERATURE: 98.3 F | WEIGHT: 239 LBS

## 2023-08-09 DIAGNOSIS — G95.20 CORD COMPRESSION (HCC): ICD-10-CM

## 2023-08-09 DIAGNOSIS — G20 PARKINSON DISEASE (HCC): Primary | ICD-10-CM

## 2023-08-09 PROCEDURE — 1036F TOBACCO NON-USER: CPT | Performed by: PHYSICAL MEDICINE & REHABILITATION

## 2023-08-09 PROCEDURE — G8427 DOCREV CUR MEDS BY ELIG CLIN: HCPCS | Performed by: PHYSICAL MEDICINE & REHABILITATION

## 2023-08-09 PROCEDURE — 99212 OFFICE O/P EST SF 10 MIN: CPT | Performed by: PHYSICAL MEDICINE & REHABILITATION

## 2023-08-09 PROCEDURE — G8417 CALC BMI ABV UP PARAM F/U: HCPCS | Performed by: PHYSICAL MEDICINE & REHABILITATION

## 2023-08-09 PROCEDURE — 3080F DIAST BP >= 90 MM HG: CPT | Performed by: PHYSICAL MEDICINE & REHABILITATION

## 2023-08-09 PROCEDURE — 3075F SYST BP GE 130 - 139MM HG: CPT | Performed by: PHYSICAL MEDICINE & REHABILITATION

## 2023-08-09 PROCEDURE — 1123F ACP DISCUSS/DSCN MKR DOCD: CPT | Performed by: PHYSICAL MEDICINE & REHABILITATION

## 2023-08-09 PROCEDURE — 3017F COLORECTAL CA SCREEN DOC REV: CPT | Performed by: PHYSICAL MEDICINE & REHABILITATION

## 2023-08-09 NOTE — PROGRESS NOTES
[address] PHYSICAL MEDICINE & REHABILITATION  [address]  [address] [address]  Dept: [phone]  Dept Fax: [phone]    Outpatient Followup Note    So Cabral, 79 y.o., male, presents for follow up c/o of Back Pain  . HPI:     HPI  Patient with B paraparesis due to lumbar spinal cord compression and comorbid Parkinson's Disease who is being seen in follow up today. He has chronic mild-moderate aching low back pain. He has been using his rollator for mobility in the home and community. He had another fall on 7/14/23 with a visit to the ED and a large R frontal scalp hematoma. CT head was negative for acute findings. He continues on Parkinson's medication and DBS. No new neurology follow up since January.       Past Medical History:   Diagnosis Date    Anxiety     Asthma     Atrial fibrillation (HCC)     Back pain     BPH (benign prostatic hyperplasia)     Dental disease     Depression     Diarrhea     MALONE (dyspnea on exertion)     Frequent falls     GERD (gastroesophageal reflux disease)     Memory loss     Obesity     Panic disorder     Parkinson's disease ([address])     Testicular cancer ([address])     Visual impairment       Past Surgical History:   Procedure Laterality Date    BRAIN SURGERY      Neurostimulator implant for Parkinson's    BROW LIFT      CARPAL TUNNEL RELEASE Right     CHOLECYSTECTOMY      COLONOSCOPY      CYSTOSCOPY      HERNIA REPAIR      LUMBAR LAMINECTOMY      LYMPH NODE DISSECTION      ORCHIECTOMY      SHOULDER ARTHROSCOPY Right     TOTAL KNEE ARTHROPLASTY Bilateral     TURP      WRIST SURGERY Left     cyst removal     Family History   Problem Relation Age of Onset    Other Mother     Heart Failure Father     Other Sister     Behavior Problems Sister     No Known Problems Brother      Social History     Socioeconomic History    Marital status:    Tobacco Use    Smoking status: Never    Smokeless tobacco: Never

## 2024-02-26 ENCOUNTER — OFFICE VISIT (OUTPATIENT)
Age: 69
End: 2024-02-26
Payer: MEDICARE

## 2024-02-26 VITALS — DIASTOLIC BLOOD PRESSURE: 84 MMHG | HEART RATE: 59 BPM | SYSTOLIC BLOOD PRESSURE: 124 MMHG

## 2024-02-26 DIAGNOSIS — R26.81 GAIT INSTABILITY: ICD-10-CM

## 2024-02-26 DIAGNOSIS — M54.16 LUMBAR RADICULOPATHY: Primary | ICD-10-CM

## 2024-02-26 DIAGNOSIS — M54.2 CERVICAL PAIN: ICD-10-CM

## 2024-02-26 PROCEDURE — 3079F DIAST BP 80-89 MM HG: CPT | Performed by: PHYSICIAN ASSISTANT

## 2024-02-26 PROCEDURE — 3074F SYST BP LT 130 MM HG: CPT | Performed by: PHYSICIAN ASSISTANT

## 2024-02-26 PROCEDURE — 1036F TOBACCO NON-USER: CPT | Performed by: PHYSICIAN ASSISTANT

## 2024-02-26 PROCEDURE — 3017F COLORECTAL CA SCREEN DOC REV: CPT | Performed by: PHYSICIAN ASSISTANT

## 2024-02-26 PROCEDURE — G8427 DOCREV CUR MEDS BY ELIG CLIN: HCPCS | Performed by: PHYSICIAN ASSISTANT

## 2024-02-26 PROCEDURE — G8484 FLU IMMUNIZE NO ADMIN: HCPCS | Performed by: PHYSICIAN ASSISTANT

## 2024-02-26 PROCEDURE — 1123F ACP DISCUSS/DSCN MKR DOCD: CPT | Performed by: PHYSICIAN ASSISTANT

## 2024-02-26 PROCEDURE — G8417 CALC BMI ABV UP PARAM F/U: HCPCS | Performed by: PHYSICIAN ASSISTANT

## 2024-02-26 PROCEDURE — 99203 OFFICE O/P NEW LOW 30 MIN: CPT | Performed by: PHYSICIAN ASSISTANT

## 2024-02-26 NOTE — PROGRESS NOTES
deficits to my examination. The patient's pupils are 3 mm, equal, round and reactive to light. The patient has full extraocular eye movements, conjugate gaze is full. Facial sensation is intact, facial expression is symmetric. Tongue is midline. Hearing is intact bilaterally. Shoulder shrug is 5 out of 5 equal bilaterally. The patient has normal strength throughout bilateral upper and lower extremities. The patient has normal sensation in both upper and lower extremities.     Studies Review:   X-rays performed recently consistently.  I demonstrated the findings with the patient and his wife.  The studies demonstrate previous fusion from the L3-L5 levels.      Assessment and Plan:   No diagnosis found.    Plan: The patient is a 68-year-old male with history of Parkinson's as well as previous lumbar fusion by Dr. Coleman presents today with a several month history of worsening back pain extending into the bilateral buttock along with neck pain and frequent falls.  Physical examination demonstrates good strength throughout bilateral upper and lower extremities.  At this point in time, given his ongoing complaints as well as past surgical history, we will proceed with imaging form of a lumbar and cervical myelogram CT as the patient is unable to undergo MRI imaging due to a battery within his body.  The studies as well as a return visit to my office will be provided for him.  I did answer any questions he had and welcomed him to feel free to contact me should he have any problems or questions we could be of assistance with.  Plan discussed and agreed upon with Dr. Reyes    Followup: No follow-ups on file.    Prescriptions Ordered:  No orders of the defined types were placed in this encounter.       Orders Placed:  No orders of the defined types were placed in this encounter.      Electronically signed by Adonay Reyes MD on 2/26/2024 at 11:28 AM    Please note that this chart was generated using voice recognition

## 2024-04-12 ENCOUNTER — HOSPITAL ENCOUNTER (OUTPATIENT)
Dept: CT IMAGING | Age: 69
End: 2024-04-12
Payer: MEDICARE

## 2024-04-12 ENCOUNTER — HOSPITAL ENCOUNTER (OUTPATIENT)
Dept: INTERVENTIONAL RADIOLOGY/VASCULAR | Age: 69
End: 2024-04-12
Payer: MEDICARE

## 2024-04-12 VITALS
TEMPERATURE: 97.9 F | BODY MASS INDEX: 29.66 KG/M2 | HEART RATE: 52 BPM | DIASTOLIC BLOOD PRESSURE: 87 MMHG | RESPIRATION RATE: 16 BRPM | OXYGEN SATURATION: 96 % | SYSTOLIC BLOOD PRESSURE: 156 MMHG | WEIGHT: 231 LBS

## 2024-04-12 DIAGNOSIS — M54.2 CERVICAL PAIN: ICD-10-CM

## 2024-04-12 DIAGNOSIS — M54.16 LUMBAR RADICULOPATHY: ICD-10-CM

## 2024-04-12 LAB
INR PPP: 1.1
PLATELET # BLD AUTO: 246 K/UL (ref 150–450)
PROTHROMBIN TIME: 14.8 SEC (ref 11.8–14.6)

## 2024-04-12 PROCEDURE — 72126 CT NECK SPINE W/DYE: CPT

## 2024-04-12 PROCEDURE — 62305 MYELOGRAPHY LUMBAR INJECTION: CPT

## 2024-04-12 PROCEDURE — 36415 COLL VENOUS BLD VENIPUNCTURE: CPT

## 2024-04-12 PROCEDURE — 6360000004 HC RX CONTRAST MEDICATION: Performed by: RADIOLOGY

## 2024-04-12 PROCEDURE — 85049 AUTOMATED PLATELET COUNT: CPT

## 2024-04-12 PROCEDURE — 7100000010 HC PHASE II RECOVERY - FIRST 15 MIN

## 2024-04-12 PROCEDURE — 7100000011 HC PHASE II RECOVERY - ADDTL 15 MIN

## 2024-04-12 PROCEDURE — 72132 CT LUMBAR SPINE W/DYE: CPT

## 2024-04-12 PROCEDURE — 2709999900 IR MYELOGRAM 2 OR MORE REGIONS

## 2024-04-12 PROCEDURE — 85610 PROTHROMBIN TIME: CPT

## 2024-04-12 RX ORDER — ACETAMINOPHEN 325 MG/1
650 TABLET ORAL EVERY 4 HOURS PRN
Status: DISCONTINUED | OUTPATIENT
Start: 2024-04-12 | End: 2024-04-15 | Stop reason: HOSPADM

## 2024-04-12 RX ORDER — SODIUM CHLORIDE 0.9 % (FLUSH) 0.9 %
5-40 SYRINGE (ML) INJECTION PRN
Status: DISCONTINUED | OUTPATIENT
Start: 2024-04-12 | End: 2024-04-15 | Stop reason: HOSPADM

## 2024-04-12 RX ORDER — SODIUM CHLORIDE 0.9 % (FLUSH) 0.9 %
5-40 SYRINGE (ML) INJECTION EVERY 12 HOURS SCHEDULED
Status: DISCONTINUED | OUTPATIENT
Start: 2024-04-12 | End: 2024-04-15 | Stop reason: HOSPADM

## 2024-04-12 RX ORDER — SODIUM CHLORIDE 9 MG/ML
INJECTION, SOLUTION INTRAVENOUS CONTINUOUS
Status: DISCONTINUED | OUTPATIENT
Start: 2024-04-12 | End: 2024-04-15 | Stop reason: HOSPADM

## 2024-04-12 RX ORDER — SODIUM CHLORIDE 9 MG/ML
INJECTION, SOLUTION INTRAVENOUS PRN
Status: DISCONTINUED | OUTPATIENT
Start: 2024-04-12 | End: 2024-04-15 | Stop reason: HOSPADM

## 2024-04-12 RX ORDER — IOPAMIDOL 612 MG/ML
INJECTION, SOLUTION INTRATHECAL PRN
Status: DISCONTINUED | OUTPATIENT
Start: 2024-04-12 | End: 2024-04-15 | Stop reason: HOSPADM

## 2024-04-12 RX ADMIN — IOPAMIDOL 15 ML: 612 INJECTION, SOLUTION INTRATHECAL at 14:03

## 2024-04-12 ASSESSMENT — PAIN - FUNCTIONAL ASSESSMENT
PAIN_FUNCTIONAL_ASSESSMENT: NONE - DENIES PAIN
PAIN_FUNCTIONAL_ASSESSMENT: NONE - DENIES PAIN

## 2024-04-12 NOTE — H&P
HISTORY and PHYSICAL  [facility]       NAME:  Kimani Purcell  MRN: [id number]   YOB: 1955   Date: 4/12/2024   Age: 68 y.o.  Gender: male       COMPLAINT AND PRESENT HISTORY:       Kimani Purcell is a 68 y.o.,  male, here today for FL MYELOGRAM CERVICAL S&I     Patient has hx of Lumbar radiculopathy     Office note per [facility]IGH[facility] on  2/26/24, italicized below.  HPI  I had the pleasure of seeing this patient in the office today with complaints of back and neck pain and gait instability.  As you know he is a 68-year-old male with Parkinson's disease who has undergone lower back surgery in the Past with Dr. Shook.  Patient presents today with a several day history of worsening pain in the back which continues into the bilateral buttocks.  He denies pain radiating more distally into the lower extremities.  Additionally he notes ongoing neck pain.  He also states he falls 2-3 times a week because his legs give way    Above reviewed and he concurs.       UPDATE    Patient comes in wheelchair.   Patient reports history of chronic pain in the neck and back. Pain has been present for the past year. .       Patient is s/p back surgery.    Pain is described as  worst than ache close to (9/10), with  radiation.down ayaka buttocks , but not at same time    Patient denies any weakness/numbness in the upper or lower extremities.      Pain is worse with getting out of bed, walking with wheeled walker. . Pain is improved with analgesic from previous surgery. .   Patient denies any incontinence of bowels/bladder. Pt admits to having a lot of diarrhea.      Significant medical history reviewed.   Atrial Fib, MALONE.   Parkinson's dx.    Pt is on Coumadin     Patient denies any chest pain,.  Pt admits to shortness of breath. recent  URI , fever/chills.      Patient has been NPO since midnight. No blood thinners in the past 5 days.(Coumadin)  Patient took morning meds this am.      RECENT  neoplasm of testis (HCC) 06/05/2016    Atrial fibrillation (HCC) 08/01/2011    Osteoarthrosis 07/27/2011    Gait disorder 04/21/2011    Parkinson disease ([facility]) 04/21/2011           JEFF SMILEY - CNP on 4/12/2024 at 12:08 PM

## 2024-04-12 NOTE — PROGRESS NOTES
HISTORY and PHYSICAL  [facility]       NAME:  Kimani Purcell  MRN: [id number]   YOB: 1955   Date: 4/12/2024   Age: 68 y.o.  Gender: male       COMPLAINT AND PRESENT HISTORY:         Kimani Purcell is a 68 y.o.,  male, here today for [place] MYELOGRAM CERVICAL S&I   Patient has hx of Lumbar radiculopathy    Office note per NIGHAT Brooks on  2/26/24, italicized below.  HPI  I had the pleasure of seeing this patient in the office today with complaints of back and neck pain and gait instability.  As you know he is a 68-year-old male with Parkinson's disease who has undergone lower back surgery in the Past with Dr. Shook.  Patient presents today with a several day history of worsening pain in the back which continues into the bilateral buttocks.  He denies pain radiating more distally into the lower extremities.  Additionally he notes ongoing neck pain.  He also states he falls 2-3 times a week because his legs give way     Patient reports history of chronic pain in the neck and back. Pain has been present for the past ***.      Patient is s/p ***   Patient denies previous history of surgery to the spine.     Pain is described as *** (***/10), with *** radiation.  Patient denies any weakness/numbness in the upper or lower extremities.     Pain is worse with ***. Pain is improved with ***.     Patient denies any incontinence of bowels/bladder.     Significant medical history reviewed.   Atrial Fib, MALONE.  Pt is on Coumadin    Patient denies any chest pain, or shortness of breath. recent  URI , fever/chills.     Patient has been NPO since midnight. No blood thinners in the past 5-7 days. Patient took *** this am.       RECENT LABS, IMAGING AND TESTING     Lab Results   Component Value Date    WBC 5.2 12/30/2022    RBC 3.66 (L) 12/30/2022    HGB 12.1 (L) 12/30/2022    HCT 36.3 (L) 12/30/2022    MCV 99.2 12/30/2022    MCH 33.2 12/30/2022    MCHC 33.4 12/30/2022    RDW 14.6 12/30/2022

## 2024-04-12 NOTE — DISCHARGE INSTRUCTIONS
POST MYELOGRAM INSTRUCTIONS    1. Do not lie flat until 12 hours after the myelogram.      2. Sleep with 2 pillows on the first night after the myelogram.    3. Do not drive on the day of the myelogram.    4. Drink at least 1 quart of fluids (milk, juice, or water) on the day of the myelogram.    5. If nausea or vomiting occurs, notify the physician who performed the myelogram.    6. Rest as much as possible until 24 hours after the myelogram.    7. If a headache occurs, use your usual headache remedies.    8. If the headache cannot be controlled by rest and available medications, notify the       Physician who performed the myelogram.    9.  Keep your follow up appointment.      IF YOU CANNOT REACH THE DOCTOR, GO TO THE NEAREST EMERGENCY FACILITY    Phone:  Interventional Radiology  [phone] (Dr Painter)  After hours Radiology  [phone]

## 2024-05-13 ENCOUNTER — OFFICE VISIT (OUTPATIENT)
Age: 69
End: 2024-05-13
Payer: MEDICARE

## 2024-05-13 DIAGNOSIS — M48.02 CERVICAL SPINAL STENOSIS: Primary | ICD-10-CM

## 2024-05-13 DIAGNOSIS — G95.20 SPINAL CORD COMPRESSION (HCC): ICD-10-CM

## 2024-05-13 DIAGNOSIS — R26.81 GAIT INSTABILITY: ICD-10-CM

## 2024-05-13 PROCEDURE — 3017F COLORECTAL CA SCREEN DOC REV: CPT | Performed by: PHYSICIAN ASSISTANT

## 2024-05-13 PROCEDURE — G8417 CALC BMI ABV UP PARAM F/U: HCPCS | Performed by: PHYSICIAN ASSISTANT

## 2024-05-13 PROCEDURE — 99213 OFFICE O/P EST LOW 20 MIN: CPT | Performed by: PHYSICIAN ASSISTANT

## 2024-05-13 PROCEDURE — 1123F ACP DISCUSS/DSCN MKR DOCD: CPT | Performed by: PHYSICIAN ASSISTANT

## 2024-05-13 PROCEDURE — G8427 DOCREV CUR MEDS BY ELIG CLIN: HCPCS | Performed by: PHYSICIAN ASSISTANT

## 2024-05-13 PROCEDURE — 1036F TOBACCO NON-USER: CPT | Performed by: PHYSICIAN ASSISTANT

## 2024-05-13 RX ORDER — LOSARTAN POTASSIUM 25 MG/1
25 TABLET ORAL DAILY
COMMUNITY
Start: 2024-02-19

## 2024-05-13 RX ORDER — DONEPEZIL HYDROCHLORIDE 10 MG/1
TABLET, FILM COATED ORAL
COMMUNITY
Start: 2023-11-20

## 2024-05-13 RX ORDER — ALFUZOSIN HYDROCHLORIDE 10 MG/1
1 TABLET, EXTENDED RELEASE ORAL DAILY
COMMUNITY

## 2024-05-13 RX ORDER — CLONAZEPAM 0.5 MG/1
0.5 TABLET ORAL
COMMUNITY
Start: 2023-11-20 | End: 2024-11-19

## 2024-05-13 RX ORDER — RASAGILINE 1 MG/1
TABLET ORAL
COMMUNITY
Start: 2024-01-17

## 2024-05-13 RX ORDER — VALSARTAN 160 MG/1
1 TABLET ORAL DAILY
COMMUNITY

## 2024-05-13 RX ORDER — TRAMADOL HYDROCHLORIDE 50 MG/1
TABLET ORAL
COMMUNITY
Start: 2022-10-11

## 2024-05-13 RX ORDER — DICLOFENAC SODIUM 75 MG/1
1 TABLET, DELAYED RELEASE ORAL DAILY
COMMUNITY

## 2024-05-13 NOTE — PROGRESS NOTES
[facility], [facility] NEUROSCIENCE [place], [facility] NEUROSURGERY  [address], [address]  [place] [id number]  Dept: [phone]  Dept Fax: [phone]     Patient:  Kimani Purcell  YOB: 1955  Date: 5/13/24      Chief Complaint   Patient presents with    Follow-up     F/u after cervical and lumbar myelogram           HPI:     I had the pleasure of seeing this patient in the office today in follow-up.  As you know he is a 68-year-old male who was last seen in our office 3 months ago with ongoing neck pain along with gait instability with frequent falls.  Additionally he notes ongoing back pain with pain diffusely into the lower extremities predominantly over the lateral aspect of his thigh and calf regions.  We did proceed with lumbar and cervical myelogram CT.  He presents today to discuss the studies.  Cervical myelogram CT demonstrates severe spinal stenosis with early spinal cord compression at the C4-C5 level with more moderate stenosis seen at C3-C4.  Marked degenerative disc disease is seen throughout the cervical spine.  Lumbar myelogram demonstrates postoperative changes with fusion from the L3-5 levels.  The underlying spinal canal is capacious with no evidence of significant central stenosis.  Vertebral body height and alignment are within normal limits.  Patient indicates his symptoms are present on a daily basis and quite debilitating to him.  Further treatment options were discussed conservative measures versus surgical intervention. Conservative measures discussed included medications, physical therapy, and/or pain management for epidural steroid injections. All of the above treatment options were discussed. The option of surgery was discussed, as well. Surgery would take the form of posterior cervical C4-5. The details and risks of surgery were thoroughly reviewed. The potential risks of surgery discussed included the risk of

## 2024-05-22 ENCOUNTER — TELEPHONE (OUTPATIENT)
Age: 69
End: 2024-05-22

## 2024-05-22 NOTE — TELEPHONE ENCOUNTER
Pt called today - about his low back is \"killing me\". Understands no surgery indicated. Would like to get a second opinion. Recommended he check w [facility] as that is is secondary -would like to go to tertiary spine care clinic, I.e., [facility], [facility] or [facility]. He will call back

## 2024-08-13 ENCOUNTER — OUTSIDE SERVICES (OUTPATIENT)
Dept: PRIMARY CARE CLINIC | Age: 69
End: 2024-08-13

## 2024-08-13 DIAGNOSIS — R29.6 FREQUENT FALLS: Chronic | ICD-10-CM

## 2024-08-13 DIAGNOSIS — G20.A1 DEMENTIA ASSOCIATED WITH PARKINSON'S DISEASE (HCC): Chronic | ICD-10-CM

## 2024-08-13 DIAGNOSIS — G20.B1 PARKINSON'S DISEASE WITH DYSKINESIA, UNSPECIFIED WHETHER MANIFESTATIONS FLUCTUATE (HCC): ICD-10-CM

## 2024-08-13 DIAGNOSIS — T79.6XXA TRAUMATIC RHABDOMYOLYSIS, INITIAL ENCOUNTER (HCC): ICD-10-CM

## 2024-08-13 DIAGNOSIS — I48.91 ATRIAL FIBRILLATION, UNSPECIFIED TYPE (HCC): Primary | ICD-10-CM

## 2024-08-13 DIAGNOSIS — F02.80 DEMENTIA ASSOCIATED WITH PARKINSON'S DISEASE (HCC): Chronic | ICD-10-CM

## 2024-08-13 PROBLEM — R13.12 OROPHARYNGEAL DYSPHAGIA: Status: ACTIVE | Noted: 2024-08-10

## 2024-08-13 PROBLEM — Z79.01 LONG TERM (CURRENT) USE OF ANTICOAGULANTS: Chronic | Status: ACTIVE | Noted: 2023-06-08

## 2024-08-13 ASSESSMENT — ENCOUNTER SYMPTOMS
SHORTNESS OF BREATH: 0
DIARRHEA: 0
COUGH: 0
VOMITING: 0
NAUSEA: 0

## 2024-08-13 NOTE — ASSESSMENT & PLAN NOTE
Irreg rhythm, rate controlled.  Continue coumadin and monitor INR.  Continue other home meds as ordered.

## 2024-08-13 NOTE — ASSESSMENT & PLAN NOTE
With frequent falls.  Admitted for therapies.  Will continue to work on ambulating safely, but not sure we will be able to do much as pt also has dementia.

## 2024-08-13 NOTE — PROGRESS NOTES
manifestations fluctuate ([facility])        4. Frequent falls        5. Dementia associated with Parkinson's disease ([facility])            PLAN:  1. Atrial fibrillation, unspecified type ([facility])  Assessment & Plan:  Irreg rhythm, rate controlled.  Continue coumadin and monitor INR.  Continue other home meds as ordered.    2. Traumatic rhabdomyolysis, initial encounter ([facility])  Assessment & Plan:  Monitor labs/ kidney function.  Therapy for frequent falls.    3. Parkinson's disease with dyskinesia, unspecified whether manifestations fluctuate ([facility])  Assessment & Plan:  With frequent falls.  Admitted for therapies.  Will continue to work on ambulating safely, but not sure we will be able to do much as pt also has dementia.     4. Frequent falls  Assessment & Plan:  As above   5. Dementia associated with Parkinson's disease ([facility])  Assessment & Plan:  [employer] not restarted on hospital d/c.  I believe this was an oversight.  I will restart it.

## 2024-08-15 DIAGNOSIS — F41.9 ANXIETY: Primary | ICD-10-CM

## 2024-08-15 RX ORDER — CLONAZEPAM 0.5 MG/1
0.5 TABLET ORAL DAILY
Qty: 30 TABLET | Refills: 0 | Status: SHIPPED | OUTPATIENT
Start: 2024-08-15 | End: 2024-09-14

## 2024-08-20 ENCOUNTER — OUTSIDE SERVICES (OUTPATIENT)
Dept: PRIMARY CARE CLINIC | Age: 69
End: 2024-08-20

## 2024-08-20 DIAGNOSIS — F02.80 DEMENTIA ASSOCIATED WITH PARKINSON'S DISEASE (HCC): Primary | Chronic | ICD-10-CM

## 2024-08-20 DIAGNOSIS — G20.B1 PARKINSON'S DISEASE WITH DYSKINESIA, UNSPECIFIED WHETHER MANIFESTATIONS FLUCTUATE (HCC): ICD-10-CM

## 2024-08-20 DIAGNOSIS — G20.A1 DEMENTIA ASSOCIATED WITH PARKINSON'S DISEASE (HCC): Primary | Chronic | ICD-10-CM

## 2024-08-20 DIAGNOSIS — T79.6XXD TRAUMATIC RHABDOMYOLYSIS, SUBSEQUENT ENCOUNTER: ICD-10-CM

## 2024-08-20 DIAGNOSIS — R29.6 FREQUENT FALLS: Chronic | ICD-10-CM

## 2024-08-20 ASSESSMENT — ENCOUNTER SYMPTOMS
SHORTNESS OF BREATH: 0
NAUSEA: 0
VOMITING: 0
COUGH: 0
DIARRHEA: 0

## 2024-08-20 NOTE — ASSESSMENT & PLAN NOTE
Has fallen out of bed 3 times in last 2 days.  Urine pending.  Pt denies falling out of bed.  Will continue home meds.

## 2024-08-20 NOTE — ASSESSMENT & PLAN NOTE
Continue with DBS- would recommend wife leaving it here so that he can use it regularly as ordered.

## 2024-08-20 NOTE — ASSESSMENT & PLAN NOTE
Fell out of bed 3 times in the past 2 days - urine pending- will continue same meds and pt is to be out of room and active if wife is not with him.

## 2024-08-20 NOTE — PROGRESS NOTES
associated with Parkinson's disease (HCC)  Assessment & Plan:  Has fallen out of bed 3 times in last 2 days.  Urine pending.  Pt denies falling out of bed.  Will continue home meds.     2. Frequent falls  Assessment & Plan:  Fell out of bed 3 times in the past 2 days - urine pending- will continue same meds and pt is to be out of room and active if wife is not with him.    3. Traumatic rhabdomyolysis, subsequent encounter  Assessment & Plan:  Will monitor labs, but seems pretty much resolved.    4. Parkinson's disease with dyskinesia, unspecified whether manifestations fluctuate (HCC)  Assessment & Plan:  Continue with DBS- would recommend wife leaving it here so that he can use it regularly as ordered.                area  Sensory: intact to LT, PP and JPS  Good FTN and HTS   Gait: Normal    Assessment:       ICD-10-CM    1. Acute right-sided low back pain without sciatica  M54.50 XR LUMBAR SPINE (2-3 VIEWS)        Possible musculoskeletal strain    Plan:   Reviewed medical records in EPIC   2. X-ray lumbar spine to look for herniated disc/arthritis  3. Trial of Diclofenac 50 mg TID  4. Skelaxin 800 mg TID  5. Lidocaine 5% patch 12 hrs on and 12 hrs off  6. Advise to avoid heavy lifting for now  7. Will consider physical therapy and MRI lumbar spine depending on above  8. Offered Medrol dose pack, but patient does not want to be placed on steroid due to prior history of weight gain from it          Return in about 3 weeks (around 11/10/2023). Maxim Strong MD  Diplomate, American Board of Psychiatry and Neurology  Diplomate, Neuromuscular Medicine  Diplomate, American Board of Electrodiagnostic Medicine      I spent total of 40 mins with the patient, greater than 50% of the visit was spent on providing counseling and coordination of care.

## 2024-08-27 ENCOUNTER — OUTSIDE SERVICES (OUTPATIENT)
Dept: PRIMARY CARE CLINIC | Age: 69
End: 2024-08-27

## 2024-08-27 DIAGNOSIS — G20.A1 DEMENTIA ASSOCIATED WITH PARKINSON'S DISEASE (HCC): Primary | Chronic | ICD-10-CM

## 2024-08-27 DIAGNOSIS — R29.6 FREQUENT FALLS: Chronic | ICD-10-CM

## 2024-08-27 DIAGNOSIS — F02.80 DEMENTIA ASSOCIATED WITH PARKINSON'S DISEASE (HCC): Primary | Chronic | ICD-10-CM

## 2024-08-27 DIAGNOSIS — R13.12 OROPHARYNGEAL DYSPHAGIA: ICD-10-CM

## 2024-08-27 DIAGNOSIS — I48.91 ATRIAL FIBRILLATION, UNSPECIFIED TYPE (HCC): ICD-10-CM

## 2024-08-27 ASSESSMENT — ENCOUNTER SYMPTOMS
EYE REDNESS: 0
EYE DISCHARGE: 0
ABDOMINAL PAIN: 0
COUGH: 0
NAUSEA: 0
RHINORRHEA: 0
WHEEZING: 0
SHORTNESS OF BREATH: 0
VOMITING: 0
SORE THROAT: 0
DIARRHEA: 0

## 2024-08-27 NOTE — ASSESSMENT & PLAN NOTE
On thickened liquids, will get repeat swallow study when pt is a bit more awake and alert    Labs/EKG

## 2024-08-27 NOTE — PROGRESS NOTES
Kimani Purcell is a 68 y.o. male being seen for his weekly follow up.  Location of visit: [facility]    HPI:  New today: Pt c/o thickened liquids to ST.         Review of Systems   Constitutional:  Negative for chills and fever.   HENT:  Negative for rhinorrhea and sore throat.    Eyes:  Negative for discharge and redness.   Respiratory:  Negative for cough, shortness of breath and wheezing.    Cardiovascular:  Negative for chest pain and palpitations.   Gastrointestinal:  Negative for abdominal pain, diarrhea, nausea and vomiting.   Genitourinary:  Negative for dysuria and frequency.   Musculoskeletal:  Negative for arthralgias and myalgias.   Neurological:  Negative for dizziness, light-headedness and headaches.   Psychiatric/Behavioral:  Negative for sleep disturbance.           Physical Exam  Vitals reviewed.   Constitutional:       General: He is not in acute distress.  HENT:      Head: Normocephalic and atraumatic.      Nose: No congestion or rhinorrhea.   Eyes:      General:         Right eye: No discharge.         Left eye: No discharge.   Cardiovascular:      Rate and Rhythm: Normal rate and regular rhythm.   Pulmonary:      Effort: Pulmonary effort is normal. No respiratory distress.      Breath sounds: Normal breath sounds.   Abdominal:      Palpations: Abdomen is soft.      Tenderness: There is no abdominal tenderness.   Musculoskeletal:      Right lower leg: No edema.      Left lower leg: No edema.   Skin:     General: Skin is warm and dry.   Neurological:      Mental Status: He is alert. Mental status is at baseline.          ASSESSMENT:   Diagnosis Orders   1. Dementia associated with Parkinson's disease (HCC)        2. Frequent falls            PLAN:  1. Dementia associated with Parkinson's disease (HCC)  Assessment & Plan:  Urine still pending.  Continue therapies. Continue home meds as ordered.    2. Frequent falls  Assessment & Plan:  Has poor safety awareness and has urine pending.  Continue

## 2024-08-27 NOTE — ASSESSMENT & PLAN NOTE
Has poor safety awareness and has urine pending.  Continue with close supervision and monitoring. Continue therapy

## 2024-09-03 ENCOUNTER — OUTSIDE SERVICES (OUTPATIENT)
Dept: PRIMARY CARE CLINIC | Age: 69
End: 2024-09-03

## 2024-09-03 DIAGNOSIS — F02.80 DEMENTIA ASSOCIATED WITH PARKINSON'S DISEASE (HCC): Primary | Chronic | ICD-10-CM

## 2024-09-03 DIAGNOSIS — I48.91 ATRIAL FIBRILLATION, UNSPECIFIED TYPE (HCC): ICD-10-CM

## 2024-09-03 DIAGNOSIS — R29.6 FREQUENT FALLS: Chronic | ICD-10-CM

## 2024-09-03 DIAGNOSIS — T79.6XXD TRAUMATIC RHABDOMYOLYSIS, SUBSEQUENT ENCOUNTER: ICD-10-CM

## 2024-09-03 DIAGNOSIS — G20.A1 DEMENTIA ASSOCIATED WITH PARKINSON'S DISEASE (HCC): Primary | Chronic | ICD-10-CM

## 2024-09-03 ASSESSMENT — ENCOUNTER SYMPTOMS
DIARRHEA: 0
SHORTNESS OF BREATH: 0
NAUSEA: 0
VOMITING: 0
COUGH: 0

## 2024-09-03 NOTE — ASSESSMENT & PLAN NOTE
Continue INR monitoring.  Having trouble getting INR stable.  May do better with eliquis, depending on cost for pt.

## 2024-09-03 NOTE — PROGRESS NOTES
Kimani Purcell is a 68 y.o. male being seen for his weekly follow up.  Location of visit: [facility]    HPI:  New today:  pt denies any recent falls.  Continue to work with speech therapy.  No c/o pain today.         Review of Systems   Constitutional:  Negative for activity change, appetite change, chills, diaphoresis and fever.   HENT:  Negative for congestion.    Respiratory:  Negative for cough and shortness of breath.    Cardiovascular:  Negative for chest pain and palpitations.   Gastrointestinal:  Negative for diarrhea, nausea and vomiting.   Genitourinary:  Negative for hematuria.   Musculoskeletal:  Negative for arthralgias and myalgias.   Skin:  Negative for rash.   Neurological:  Negative for weakness and headaches.   Psychiatric/Behavioral:  Negative for agitation, dysphoric mood and sleep disturbance. The patient is not nervous/anxious.           Physical Exam  Vitals reviewed.   Constitutional:       General: He is not in acute distress.  HENT:      Head: Normocephalic and atraumatic.      Nose: No congestion or rhinorrhea.   Eyes:      General:         Right eye: No discharge.         Left eye: No discharge.   Cardiovascular:      Rate and Rhythm: Normal rate and regular rhythm.   Pulmonary:      Effort: Pulmonary effort is normal. No respiratory distress.      Breath sounds: Normal breath sounds.   Abdominal:      Palpations: Abdomen is soft.      Tenderness: There is no abdominal tenderness.   Musculoskeletal:      Right lower leg: No edema.      Left lower leg: No edema.   Skin:     General: Skin is warm and dry.   Neurological:      Mental Status: He is alert. Mental status is at baseline.          ASSESSMENT:   Diagnosis Orders   1. Dementia associated with Parkinson's disease (HCC)        2. Traumatic rhabdomyolysis, subsequent encounter        3. Frequent falls        4. Atrial fibrillation, unspecified type (HCC)            PLAN:  1. Dementia associated with Parkinson's disease

## 2024-09-10 ENCOUNTER — OUTSIDE SERVICES (OUTPATIENT)
Dept: PRIMARY CARE CLINIC | Age: 69
End: 2024-09-10

## 2024-09-10 DIAGNOSIS — I10 PRIMARY HYPERTENSION: ICD-10-CM

## 2024-09-10 DIAGNOSIS — R29.6 FREQUENT FALLS: Chronic | ICD-10-CM

## 2024-09-10 DIAGNOSIS — G20.A1 DEMENTIA ASSOCIATED WITH PARKINSON'S DISEASE (HCC): Primary | Chronic | ICD-10-CM

## 2024-09-10 DIAGNOSIS — F02.80 DEMENTIA ASSOCIATED WITH PARKINSON'S DISEASE (HCC): Primary | Chronic | ICD-10-CM

## 2024-09-10 DIAGNOSIS — G20.B1 PARKINSON'S DISEASE WITH DYSKINESIA, UNSPECIFIED WHETHER MANIFESTATIONS FLUCTUATE (HCC): ICD-10-CM

## 2024-09-10 ASSESSMENT — ENCOUNTER SYMPTOMS
NAUSEA: 0
COUGH: 0
DIARRHEA: 0
SHORTNESS OF BREATH: 0
VOMITING: 0

## 2024-09-11 DIAGNOSIS — F41.9 ANXIETY: ICD-10-CM

## 2024-09-11 RX ORDER — CLONAZEPAM 0.5 MG/1
0.5 TABLET ORAL NIGHTLY
Qty: 30 TABLET | Refills: 0 | Status: SHIPPED | OUTPATIENT
Start: 2024-09-11 | End: 2024-10-11

## 2024-09-17 ENCOUNTER — OUTSIDE SERVICES (OUTPATIENT)
Dept: PRIMARY CARE CLINIC | Age: 69
End: 2024-09-17

## 2024-09-17 DIAGNOSIS — F02.80 DEMENTIA ASSOCIATED WITH PARKINSON'S DISEASE (HCC): Primary | ICD-10-CM

## 2024-09-17 DIAGNOSIS — G20.A1 DEMENTIA ASSOCIATED WITH PARKINSON'S DISEASE (HCC): Primary | ICD-10-CM

## 2024-09-17 DIAGNOSIS — F41.9 ANXIETY: ICD-10-CM

## 2024-09-17 DIAGNOSIS — R29.6 FREQUENT FALLS: ICD-10-CM

## 2024-09-17 ASSESSMENT — ENCOUNTER SYMPTOMS
COUGH: 0
RECTAL PAIN: 0
SHORTNESS OF BREATH: 0
BACK PAIN: 1
NAUSEA: 0
DIARRHEA: 0

## 2024-12-16 ENCOUNTER — TELEPHONE (OUTPATIENT)
Dept: PHYSICAL MEDICINE AND REHAB | Age: 69
End: 2024-12-16

## 2024-12-16 NOTE — TELEPHONE ENCOUNTER
FYI: As I was printing notes I noticed pt was moved to [facility]. I called them with Monday's appointment information. A nurse at the facility called to let us know that patient is on hospice and wanted to know what the appointment was for. We cancelled this appointment as it was for a yearly follow up.